# Patient Record
Sex: MALE | Race: BLACK OR AFRICAN AMERICAN | NOT HISPANIC OR LATINO | URBAN - METROPOLITAN AREA
[De-identification: names, ages, dates, MRNs, and addresses within clinical notes are randomized per-mention and may not be internally consistent; named-entity substitution may affect disease eponyms.]

---

## 2017-10-26 ENCOUNTER — OUTPATIENT (OUTPATIENT)
Dept: OUTPATIENT SERVICES | Facility: HOSPITAL | Age: 72
LOS: 1 days | Discharge: HOME | End: 2017-10-26

## 2017-10-26 ENCOUNTER — APPOINTMENT (OUTPATIENT)
Dept: GERIATRICS | Facility: CLINIC | Age: 72
End: 2017-10-26

## 2017-10-26 VITALS
HEART RATE: 76 BPM | HEIGHT: 68 IN | SYSTOLIC BLOOD PRESSURE: 183 MMHG | DIASTOLIC BLOOD PRESSURE: 90 MMHG | BODY MASS INDEX: 32.51 KG/M2 | TEMPERATURE: 97.1 F | WEIGHT: 214.5 LBS

## 2017-10-26 VITALS — DIASTOLIC BLOOD PRESSURE: 83 MMHG | SYSTOLIC BLOOD PRESSURE: 179 MMHG | HEART RATE: 74 BPM

## 2017-10-26 DIAGNOSIS — Z78.9 OTHER SPECIFIED HEALTH STATUS: ICD-10-CM

## 2017-10-30 ENCOUNTER — RESULT REVIEW (OUTPATIENT)
Age: 72
End: 2017-10-30

## 2017-11-08 ENCOUNTER — RESULT CHARGE (OUTPATIENT)
Age: 72
End: 2017-11-08

## 2017-11-09 ENCOUNTER — APPOINTMENT (OUTPATIENT)
Dept: GERIATRICS | Facility: CLINIC | Age: 72
End: 2017-11-09

## 2017-11-09 ENCOUNTER — OUTPATIENT (OUTPATIENT)
Dept: OUTPATIENT SERVICES | Facility: HOSPITAL | Age: 72
LOS: 1 days | Discharge: HOME | End: 2017-11-09

## 2017-11-09 VITALS
WEIGHT: 206 LBS | DIASTOLIC BLOOD PRESSURE: 74 MMHG | HEIGHT: 68 IN | SYSTOLIC BLOOD PRESSURE: 148 MMHG | BODY MASS INDEX: 31.22 KG/M2 | HEART RATE: 84 BPM

## 2017-11-09 DIAGNOSIS — I10 ESSENTIAL (PRIMARY) HYPERTENSION: ICD-10-CM

## 2017-11-09 DIAGNOSIS — I51.7 CARDIOMEGALY: ICD-10-CM

## 2017-11-16 DIAGNOSIS — R73.03 PREDIABETES: ICD-10-CM

## 2017-11-16 DIAGNOSIS — R06.00 DYSPNEA, UNSPECIFIED: ICD-10-CM

## 2017-11-16 DIAGNOSIS — Z23 ENCOUNTER FOR IMMUNIZATION: ICD-10-CM

## 2017-11-16 DIAGNOSIS — I51.7 CARDIOMEGALY: ICD-10-CM

## 2017-11-16 DIAGNOSIS — I10 ESSENTIAL (PRIMARY) HYPERTENSION: ICD-10-CM

## 2017-11-27 ENCOUNTER — APPOINTMENT (OUTPATIENT)
Dept: CARDIOLOGY | Facility: CLINIC | Age: 72
End: 2017-11-27

## 2017-11-27 ENCOUNTER — OUTPATIENT (OUTPATIENT)
Dept: OUTPATIENT SERVICES | Facility: HOSPITAL | Age: 72
LOS: 1 days | Discharge: HOME | End: 2017-11-27

## 2017-11-27 VITALS
BODY MASS INDEX: 30.92 KG/M2 | DIASTOLIC BLOOD PRESSURE: 76 MMHG | HEART RATE: 73 BPM | SYSTOLIC BLOOD PRESSURE: 145 MMHG | WEIGHT: 204 LBS | HEIGHT: 68 IN

## 2017-12-13 LAB
25(OH)D3 SERPL-MCNC: 37 NG/ML
ALBUMIN SERPL-MCNC: 4.1 G/DL
ALBUMIN/GLOB SERPL: 1.58
ALP SERPL-CCNC: 86 IU/L
ALT SERPL-CCNC: 28 IU/L
ANION GAP SERPL CALC-SCNC: 10 MMOL/L
AST SERPL-CCNC: 25 IU/L
BASOPHILS # BLD: 0.02 TH/MM3
BASOPHILS NFR BLD: 0.3 %
BILIRUB SERPL-MCNC: 1 MG/DL
BUN SERPL-MCNC: 16 MG/DL
BUN/CREAT SERPL: 17.6 %
CALCIUM SERPL-MCNC: 9.5 MG/DL
CHLORIDE SERPL-SCNC: 100 MMOL/L
CO2 SERPL-SCNC: 28 MMOL/L
CREAT SERPL-MCNC: 0.91 MG/DL
EOSINOPHIL # BLD: 0.16 TH/MM3
EOSINOPHIL NFR BLD: 2.5 %
ERYTHROCYTE [DISTWIDTH] IN BLOOD BY AUTOMATED COUNT: 15.4 %
ESTIMATED AVERGAGE GLUCOSE (NORTH): 137 MG/DL
GFR SERPL CREATININE-BSD FRML MDRD: 100
GLUCOSE SERPL-MCNC: 83 MG/DL
GRANULOCYTES # BLD: 3.27 TH/MM3
GRANULOCYTES NFR BLD: 52.1 %
HBA1C MFR BLD: 6.4 %
HCT VFR BLD AUTO: 42.4 %
HGB BLD-MCNC: 14.1 G/DL
IMM GRANULOCYTES # BLD: 0.01 TH/MM3
IMM GRANULOCYTES NFR BLD: 0.2 %
LYMPHOCYTES # BLD: 2.21 TH/MM3
LYMPHOCYTES NFR BLD: 35.2 %
MCH RBC QN AUTO: 25.5 PG
MCHC RBC AUTO-ENTMCNC: 33.3 G/DL
MCV RBC AUTO: 76.8 FL
MONOCYTES # BLD: 0.61 TH/MM3
MONOCYTES NFR BLD: 9.7 %
PLATELET # BLD: 224 TH/MM3
PMV BLD AUTO: 10.1 FL
POTASSIUM SERPL-SCNC: 4.1 MMOL/L
PROT SERPL-MCNC: 6.7 G/DL
RBC # BLD AUTO: 5.52 ML/MM3
SODIUM SERPL-SCNC: 138 MMOL/L
TSH SERPL DL<=0.005 MIU/L-ACNC: 2.3 UIU/ML
VITAMIN D2 SERPL-MCNC: <4 NG/ML
VITAMIN D3 SERPL-MCNC: 37 NG/ML
WBC # BLD: 6.28 TH/MM3

## 2018-01-05 ENCOUNTER — APPOINTMENT (OUTPATIENT)
Dept: GASTROENTEROLOGY | Facility: CLINIC | Age: 73
End: 2018-01-05

## 2018-02-02 ENCOUNTER — APPOINTMENT (OUTPATIENT)
Dept: PULMONOLOGY | Facility: CLINIC | Age: 73
End: 2018-02-02

## 2018-02-22 ENCOUNTER — APPOINTMENT (OUTPATIENT)
Dept: GERIATRICS | Facility: CLINIC | Age: 73
End: 2018-02-22

## 2018-03-26 ENCOUNTER — OUTPATIENT (OUTPATIENT)
Dept: OUTPATIENT SERVICES | Facility: HOSPITAL | Age: 73
LOS: 1 days | Discharge: HOME | End: 2018-03-26

## 2018-03-26 ENCOUNTER — APPOINTMENT (OUTPATIENT)
Dept: GERIATRICS | Facility: HOSPITAL | Age: 73
End: 2018-03-26

## 2018-03-26 VITALS
DIASTOLIC BLOOD PRESSURE: 90 MMHG | SYSTOLIC BLOOD PRESSURE: 140 MMHG | HEIGHT: 68.5 IN | HEART RATE: 80 BPM | BODY MASS INDEX: 32.06 KG/M2 | WEIGHT: 214 LBS | TEMPERATURE: 98.4 F

## 2018-03-27 DIAGNOSIS — N40.1 BENIGN PROSTATIC HYPERPLASIA WITH LOWER URINARY TRACT SYMPTOMS: ICD-10-CM

## 2018-03-27 DIAGNOSIS — G47.9 SLEEP DISORDER, UNSPECIFIED: ICD-10-CM

## 2018-03-27 DIAGNOSIS — R73.03 PREDIABETES: ICD-10-CM

## 2018-03-27 DIAGNOSIS — I10 ESSENTIAL (PRIMARY) HYPERTENSION: ICD-10-CM

## 2018-04-05 ENCOUNTER — APPOINTMENT (OUTPATIENT)
Dept: GERIATRICS | Facility: CLINIC | Age: 73
End: 2018-04-05

## 2018-05-04 ENCOUNTER — APPOINTMENT (OUTPATIENT)
Dept: GASTROENTEROLOGY | Facility: CLINIC | Age: 73
End: 2018-05-04

## 2018-05-04 ENCOUNTER — OUTPATIENT (OUTPATIENT)
Dept: OUTPATIENT SERVICES | Facility: HOSPITAL | Age: 73
LOS: 1 days | Discharge: HOME | End: 2018-05-04

## 2018-05-04 VITALS
HEART RATE: 83 BPM | WEIGHT: 215 LBS | HEIGHT: 68.5 IN | SYSTOLIC BLOOD PRESSURE: 131 MMHG | DIASTOLIC BLOOD PRESSURE: 71 MMHG | BODY MASS INDEX: 32.21 KG/M2

## 2018-05-25 ENCOUNTER — OUTPATIENT (OUTPATIENT)
Dept: OUTPATIENT SERVICES | Facility: HOSPITAL | Age: 73
LOS: 1 days | Discharge: HOME | End: 2018-05-25

## 2018-05-25 ENCOUNTER — APPOINTMENT (OUTPATIENT)
Dept: PULMONOLOGY | Facility: CLINIC | Age: 73
End: 2018-05-25

## 2018-05-25 VITALS
SYSTOLIC BLOOD PRESSURE: 153 MMHG | HEIGHT: 68.5 IN | WEIGHT: 214 LBS | DIASTOLIC BLOOD PRESSURE: 80 MMHG | OXYGEN SATURATION: 97 % | HEART RATE: 74 BPM | BODY MASS INDEX: 32.06 KG/M2

## 2018-06-12 ENCOUNTER — OUTPATIENT (OUTPATIENT)
Dept: OUTPATIENT SERVICES | Facility: HOSPITAL | Age: 73
LOS: 1 days | Discharge: HOME | End: 2018-06-12

## 2018-06-13 DIAGNOSIS — G47.33 OBSTRUCTIVE SLEEP APNEA (ADULT) (PEDIATRIC): ICD-10-CM

## 2018-07-27 PROBLEM — Z78.9 ALCOHOL USE: Status: INACTIVE | Noted: 2017-10-26

## 2018-09-19 ENCOUNTER — MESSAGE (OUTPATIENT)
Age: 73
End: 2018-09-19

## 2018-09-21 ENCOUNTER — RESULT REVIEW (OUTPATIENT)
Age: 73
End: 2018-09-21

## 2018-09-21 ENCOUNTER — OUTPATIENT (OUTPATIENT)
Dept: OUTPATIENT SERVICES | Facility: HOSPITAL | Age: 73
LOS: 1 days | Discharge: HOME | End: 2018-09-21

## 2018-09-21 VITALS
SYSTOLIC BLOOD PRESSURE: 180 MMHG | TEMPERATURE: 99 F | HEIGHT: 71 IN | RESPIRATION RATE: 18 BRPM | DIASTOLIC BLOOD PRESSURE: 87 MMHG | HEART RATE: 67 BPM | WEIGHT: 214.95 LBS

## 2018-09-21 VITALS — SYSTOLIC BLOOD PRESSURE: 162 MMHG | DIASTOLIC BLOOD PRESSURE: 92 MMHG | RESPIRATION RATE: 16 BRPM | HEART RATE: 61 BPM

## 2018-09-21 NOTE — ASU DISCHARGE PLAN (ADULT/PEDIATRIC). - NOTIFY
Bleeding that does not stop/Pain not relieved by Medications/Fever greater than 101/Persistent Nausea and Vomiting/Excessive Diarrhea/Inability to Tolerate Liquids or Foods

## 2018-09-21 NOTE — CHART NOTE - NSCHARTNOTEFT_GEN_A_CORE
PACU ANESTHESIA ADMISSION NOTE      Procedure:   Post op diagnosis:      ____  Intubated  TV:______       Rate: ______      FiO2: ______    _x___  Patent Airway    _x___  Full return of protective reflexes    _x___  Full recovery from anesthesia / back to baseline status    Vitals:            T:                BP :    124/69            R:  [id number]          Sat:               P:      Mental Status:  _x___ Awake   _____ Alert   _____ Drowsy   _____ Sedated    Nausea/Vomiting:  _x___  NO       ______Yes,   See Post - Op Orders         Pain Scale (0-10):  __0___    Treatment: _x___ None    ____ See Post - Op/PCA Orders    Post - Operative Fluids:   __x__ Oral   ____ See Post - Op Orders    Plan: Discharge:   _x___Home       _____Floor     _____Critical Care    _____  Other:_________________    Comments:  No anesthesia issues or complications noted.  Discharge when criteria met.

## 2018-09-25 LAB — SURGICAL PATHOLOGY STUDY: SIGNIFICANT CHANGE UP

## 2018-09-26 DIAGNOSIS — K52.9 NONINFECTIVE GASTROENTERITIS AND COLITIS, UNSPECIFIED: ICD-10-CM

## 2018-09-26 DIAGNOSIS — D12.3 BENIGN NEOPLASM OF TRANSVERSE COLON: ICD-10-CM

## 2018-09-26 DIAGNOSIS — G47.9 SLEEP DISORDER, UNSPECIFIED: ICD-10-CM

## 2018-09-26 DIAGNOSIS — H91.90 UNSPECIFIED HEARING LOSS, UNSPECIFIED EAR: ICD-10-CM

## 2018-09-26 DIAGNOSIS — K63.5 POLYP OF COLON: ICD-10-CM

## 2018-09-26 DIAGNOSIS — Z12.11 ENCOUNTER FOR SCREENING FOR MALIGNANT NEOPLASM OF COLON: ICD-10-CM

## 2018-09-26 DIAGNOSIS — K64.4 RESIDUAL HEMORRHOIDAL SKIN TAGS: ICD-10-CM

## 2018-09-26 DIAGNOSIS — K64.8 OTHER HEMORRHOIDS: ICD-10-CM

## 2018-09-26 DIAGNOSIS — Z79.82 LONG TERM (CURRENT) USE OF ASPIRIN: ICD-10-CM

## 2018-09-26 DIAGNOSIS — R73.03 PREDIABETES: ICD-10-CM

## 2018-09-26 DIAGNOSIS — I10 ESSENTIAL (PRIMARY) HYPERTENSION: ICD-10-CM

## 2018-09-26 DIAGNOSIS — N40.0 BENIGN PROSTATIC HYPERPLASIA WITHOUT LOWER URINARY TRACT SYMPTOMS: ICD-10-CM

## 2018-09-26 DIAGNOSIS — K57.30 DIVERTICULOSIS OF LARGE INTESTINE WITHOUT PERFORATION OR ABSCESS WITHOUT BLEEDING: ICD-10-CM

## 2018-09-26 DIAGNOSIS — E55.9 VITAMIN D DEFICIENCY, UNSPECIFIED: ICD-10-CM

## 2018-10-03 ENCOUNTER — APPOINTMENT (OUTPATIENT)
Dept: INTERNAL MEDICINE | Facility: CLINIC | Age: 73
End: 2018-10-03

## 2018-10-03 ENCOUNTER — OUTPATIENT (OUTPATIENT)
Dept: OUTPATIENT SERVICES | Facility: HOSPITAL | Age: 73
LOS: 1 days | Discharge: HOME | End: 2018-10-03

## 2018-10-03 ENCOUNTER — MED ADMIN CHARGE (OUTPATIENT)
Age: 73
End: 2018-10-03

## 2018-10-03 PROBLEM — I10 ESSENTIAL (PRIMARY) HYPERTENSION: Chronic | Status: ACTIVE | Noted: 2018-09-21

## 2018-10-06 DIAGNOSIS — Z23 ENCOUNTER FOR IMMUNIZATION: ICD-10-CM

## 2018-12-12 ENCOUNTER — APPOINTMENT (OUTPATIENT)
Dept: GERIATRICS | Facility: CLINIC | Age: 73
End: 2018-12-12

## 2018-12-12 ENCOUNTER — OUTPATIENT (OUTPATIENT)
Dept: OUTPATIENT SERVICES | Facility: HOSPITAL | Age: 73
LOS: 1 days | Discharge: HOME | End: 2018-12-12

## 2018-12-12 VITALS
WEIGHT: 208 LBS | TEMPERATURE: 97.2 F | DIASTOLIC BLOOD PRESSURE: 71 MMHG | SYSTOLIC BLOOD PRESSURE: 131 MMHG | HEART RATE: 79 BPM | HEIGHT: 68.5 IN | BODY MASS INDEX: 31.16 KG/M2

## 2018-12-12 RX ORDER — POLYETHYLENE GLYCOL 3350, SODIUM SULFATE ANHYDROUS, SODIUM BICARBONATE, SODIUM CHLORIDE, POTASSIUM CHLORIDE 227.1; 21.5; 6.36; 5.53; .754 G/L; G/L; G/L; G/L; G/L
227.1 POWDER, FOR SOLUTION ORAL
Qty: 1 | Refills: 0 | Status: COMPLETED | COMMUNITY
Start: 2018-05-04 | End: 2018-12-12

## 2018-12-12 RX ORDER — TAMSULOSIN HYDROCHLORIDE 0.4 MG/1
0.4 CAPSULE ORAL
Qty: 30 | Refills: 3 | Status: DISCONTINUED | COMMUNITY
Start: 2018-03-26 | End: 2018-03-26

## 2018-12-14 DIAGNOSIS — I10 ESSENTIAL (PRIMARY) HYPERTENSION: ICD-10-CM

## 2018-12-14 DIAGNOSIS — G47.9 SLEEP DISORDER, UNSPECIFIED: ICD-10-CM

## 2018-12-14 DIAGNOSIS — N40.0 BENIGN PROSTATIC HYPERPLASIA WITHOUT LOWER URINARY TRACT SYMPTOMS: ICD-10-CM

## 2019-01-16 ENCOUNTER — LABORATORY RESULT (OUTPATIENT)
Age: 74
End: 2019-01-16

## 2019-01-16 ENCOUNTER — OUTPATIENT (OUTPATIENT)
Dept: OUTPATIENT SERVICES | Facility: HOSPITAL | Age: 74
LOS: 1 days | Discharge: HOME | End: 2019-01-16

## 2019-01-16 DIAGNOSIS — Z00.00 ENCOUNTER FOR GENERAL ADULT MEDICAL EXAMINATION WITHOUT ABNORMAL FINDINGS: ICD-10-CM

## 2019-01-24 ENCOUNTER — OUTPATIENT (OUTPATIENT)
Dept: OUTPATIENT SERVICES | Facility: HOSPITAL | Age: 74
LOS: 1 days | Discharge: HOME | End: 2019-01-24

## 2019-01-24 ENCOUNTER — APPOINTMENT (OUTPATIENT)
Dept: GERIATRICS | Facility: CLINIC | Age: 74
End: 2019-01-24

## 2019-01-24 DIAGNOSIS — M25.562 PAIN IN LEFT KNEE: ICD-10-CM

## 2019-01-24 DIAGNOSIS — M54.5 LOW BACK PAIN: ICD-10-CM

## 2019-02-07 ENCOUNTER — APPOINTMENT (OUTPATIENT)
Dept: GERIATRICS | Facility: CLINIC | Age: 74
End: 2019-02-07

## 2019-02-07 ENCOUNTER — OUTPATIENT (OUTPATIENT)
Dept: OUTPATIENT SERVICES | Facility: HOSPITAL | Age: 74
LOS: 1 days | Discharge: HOME | End: 2019-02-07

## 2019-02-07 VITALS
SYSTOLIC BLOOD PRESSURE: 175 MMHG | HEART RATE: 74 BPM | WEIGHT: 206 LBS | DIASTOLIC BLOOD PRESSURE: 80 MMHG | TEMPERATURE: 96.8 F | BODY MASS INDEX: 30.86 KG/M2 | HEIGHT: 68.5 IN

## 2019-02-07 DIAGNOSIS — R06.09 OTHER FORMS OF DYSPNEA: ICD-10-CM

## 2019-02-07 NOTE — SOCIAL HISTORY
[No falls in past year] : Patient reported no falls in the past year [Fully functional (bathing, dressing, toileting, transferring, walking, feeding)] : Fully functional (bathing, dressing, toileting, transferring, walking, feeding)

## 2019-02-07 NOTE — PHYSICAL EXAM
[Total Score ___ / 30] : the patient achieved a score of [unfilled] /30 [Date / Time ___ / 5] : date / time [unfilled] / 5 [Place ___ / 5] : place [unfilled] / 5 [Registration ___ / 3] : registration [unfilled] / 3 [Serial Sevens ___/5] : serial sevens [unfilled] / 5 [Naming 2 Objects ___ / 2] : naming two objects [unfilled] / 2 [Repeating a Sentence ___ / 1] : repeating a sentence [unfilled] / 1 [Writing a Sentence ___ / 1] : write sentence [unfilled] / 1 [3-stage Verbal Command ___ / 3] : three-stage verbal command [unfilled] / 3 [Written Command ___ / 1] : written command [unfilled] / 1 [Copy a Design ___ / 1] : copy a design [unfilled] / 1 [Recall ___ / 3] : recall [unfilled] / 3 [Oriented To Time, Place, And Person] : oriented to person, place, and time [General Appearance - Alert] : alert [General Appearance - In No Acute Distress] : in no acute distress [PERRL With Normal Accommodation] : pupils were equal in size, round, and reactive to light [Neck Appearance] : the appearance of the neck was normal [Heart Sounds] : normal S1 and S2 [Edema] : there was no peripheral edema [Bowel Sounds] : normal bowel sounds [Abdomen Soft] : soft [Abdomen Tenderness] : non-tender [Abnormal Walk] : normal gait [Skin Turgor] : normal skin turgor [No Focal Deficits] : no focal deficits

## 2019-02-07 NOTE — ASSESSMENT
[FreeTextEntry1] : *HTN\par -uncontrolled he ran out of the medications\par -medications renewed\par \par *blurry vision\par -ophtalmo r/o cataract\par \par *left hearing loss\par -ENT referral\par \par **memory impairment\par -neuropsych evaluation\par -b12, homocysteine, rpr\par \par *left knee pain\par -xray degenerative changes\par -tylenol prn\par \par *HCM\par -flu vaccine given\par -colonoscopy done this year, follow up with GI for results\par -RTC in 4 months and PRN [Daily physical exercise as tolerated] : Daily physical exercise as tolerated

## 2019-02-07 NOTE — REVIEW OF SYSTEMS
[Eyesight Problems] : eyesight problems [Loss Of Hearing] : hearing loss [Fever] : no fever [Chills] : no chills [Nosebleeds] : no nosebleeds [Chest Pain] : no chest pain [Palpitations] : no palpitations [Cough] : no cough [SOB on Exertion] : no shortness of breath during exertion [Abdominal Pain] : no abdominal pain [Constipation] : no constipation

## 2019-02-07 NOTE — HISTORY OF PRESENT ILLNESS
[Memory Lapses Or Loss] : denies memory impairment [Patient Observed To Be Agitated] : denies agitation [Hostility Toward Caregivers] : denies aggression [Sleep Disturbances] : denies sleep disturbances [FreeTextEntry1] : 74 yo AA male with HTN and hx of cardiomegaly reports for follow up visit\par \par he reports left knee pain for 5 years now, mild pain, he can still walk, did not try any painkillers.\par left ear hearing loss, no pain, no tinnitus.\par blurry vision never evaluated\par \par he ran out of his medications, /80 denies chest pain, H/A\par \par no dyspnea on exertion, no urinary incontinence, independent in activity of daily living, no fall

## 2019-03-08 ENCOUNTER — OUTPATIENT (OUTPATIENT)
Dept: OUTPATIENT SERVICES | Facility: HOSPITAL | Age: 74
LOS: 1 days | Discharge: HOME | End: 2019-03-08

## 2019-04-17 ENCOUNTER — OUTPATIENT (OUTPATIENT)
Dept: OUTPATIENT SERVICES | Facility: HOSPITAL | Age: 74
LOS: 1 days | Discharge: HOME | End: 2019-04-17

## 2019-04-29 DIAGNOSIS — H35.033 HYPERTENSIVE RETINOPATHY, BILATERAL: ICD-10-CM

## 2019-04-29 DIAGNOSIS — H25.13 AGE-RELATED NUCLEAR CATARACT, BILATERAL: ICD-10-CM

## 2019-04-29 DIAGNOSIS — H40.023 OPEN ANGLE WITH BORDERLINE FINDINGS, HIGH RISK, BILATERAL: ICD-10-CM

## 2019-06-10 ENCOUNTER — OUTPATIENT (OUTPATIENT)
Dept: OUTPATIENT SERVICES | Facility: HOSPITAL | Age: 74
LOS: 1 days | Discharge: HOME | End: 2019-06-10
Payer: SUBSIDIZED

## 2019-06-10 PROCEDURE — 92083 EXTENDED VISUAL FIELD XM: CPT | Mod: 26

## 2019-06-10 PROCEDURE — 99213 OFFICE O/P EST LOW 20 MIN: CPT

## 2019-07-15 ENCOUNTER — OUTPATIENT (OUTPATIENT)
Dept: OUTPATIENT SERVICES | Facility: HOSPITAL | Age: 74
LOS: 1 days | Discharge: HOME | End: 2019-07-15
Payer: SUBSIDIZED

## 2019-07-15 DIAGNOSIS — H25.13 AGE-RELATED NUCLEAR CATARACT, BILATERAL: ICD-10-CM

## 2019-07-15 DIAGNOSIS — H40.1122 PRIMARY OPEN-ANGLE GLAUCOMA, LEFT EYE, MODERATE STAGE: ICD-10-CM

## 2019-07-15 DIAGNOSIS — H40.1123 PRIMARY OPEN-ANGLE GLAUCOMA, LEFT EYE, SEVERE STAGE: ICD-10-CM

## 2019-07-15 PROCEDURE — 99213 OFFICE O/P EST LOW 20 MIN: CPT

## 2019-08-04 ENCOUNTER — EMERGENCY (EMERGENCY)
Facility: HOSPITAL | Age: 74
LOS: 0 days | Discharge: HOME | End: 2019-08-05
Attending: EMERGENCY MEDICINE | Admitting: EMERGENCY MEDICINE
Payer: MEDICAID

## 2019-08-04 VITALS
SYSTOLIC BLOOD PRESSURE: 159 MMHG | HEART RATE: 75 BPM | DIASTOLIC BLOOD PRESSURE: 78 MMHG | TEMPERATURE: 98 F | OXYGEN SATURATION: 100 % | RESPIRATION RATE: 18 BRPM

## 2019-08-04 DIAGNOSIS — R51 HEADACHE: ICD-10-CM

## 2019-08-04 DIAGNOSIS — M79.10 MYALGIA, UNSPECIFIED SITE: ICD-10-CM

## 2019-08-04 DIAGNOSIS — I10 ESSENTIAL (PRIMARY) HYPERTENSION: ICD-10-CM

## 2019-08-04 DIAGNOSIS — R07.89 OTHER CHEST PAIN: ICD-10-CM

## 2019-08-04 DIAGNOSIS — R60.0 LOCALIZED EDEMA: ICD-10-CM

## 2019-08-04 DIAGNOSIS — R07.9 CHEST PAIN, UNSPECIFIED: ICD-10-CM

## 2019-08-04 LAB
ALBUMIN SERPL ELPH-MCNC: 4.4 G/DL — SIGNIFICANT CHANGE UP (ref 3.5–5.2)
ALP SERPL-CCNC: 98 U/L — SIGNIFICANT CHANGE UP (ref 30–115)
ALT FLD-CCNC: 23 U/L — SIGNIFICANT CHANGE UP (ref 0–41)
ANION GAP SERPL CALC-SCNC: 14 MMOL/L — SIGNIFICANT CHANGE UP (ref 7–14)
AST SERPL-CCNC: 23 U/L — SIGNIFICANT CHANGE UP (ref 0–41)
BILIRUB SERPL-MCNC: 0.3 MG/DL — SIGNIFICANT CHANGE UP (ref 0.2–1.2)
BUN SERPL-MCNC: 13 MG/DL — SIGNIFICANT CHANGE UP (ref 10–20)
CALCIUM SERPL-MCNC: 9.7 MG/DL — SIGNIFICANT CHANGE UP (ref 8.5–10.1)
CHLORIDE SERPL-SCNC: 101 MMOL/L — SIGNIFICANT CHANGE UP (ref 98–110)
CO2 SERPL-SCNC: 26 MMOL/L — SIGNIFICANT CHANGE UP (ref 17–32)
CREAT SERPL-MCNC: 1 MG/DL — SIGNIFICANT CHANGE UP (ref 0.7–1.5)
GLUCOSE SERPL-MCNC: 100 MG/DL — HIGH (ref 70–99)
HCT VFR BLD CALC: 43.8 % — SIGNIFICANT CHANGE UP (ref 42–52)
HGB BLD-MCNC: 13.8 G/DL — LOW (ref 14–18)
MCHC RBC-ENTMCNC: 25.3 PG — LOW (ref 27–31)
MCHC RBC-ENTMCNC: 31.5 G/DL — LOW (ref 32–37)
MCV RBC AUTO: 80.2 FL — SIGNIFICANT CHANGE UP (ref 80–94)
NRBC # BLD: 0 /100 WBCS — SIGNIFICANT CHANGE UP (ref 0–0)
NT-PROBNP SERPL-SCNC: <5 PG/ML — SIGNIFICANT CHANGE UP (ref 0–300)
PLATELET # BLD AUTO: 222 K/UL — SIGNIFICANT CHANGE UP (ref 130–400)
POTASSIUM SERPL-MCNC: 4.1 MMOL/L — SIGNIFICANT CHANGE UP (ref 3.5–5)
POTASSIUM SERPL-SCNC: 4.1 MMOL/L — SIGNIFICANT CHANGE UP (ref 3.5–5)
PROT SERPL-MCNC: 7.8 G/DL — SIGNIFICANT CHANGE UP (ref 6–8)
RBC # BLD: 5.46 M/UL — SIGNIFICANT CHANGE UP (ref 4.7–6.1)
RBC # FLD: 13.2 % — SIGNIFICANT CHANGE UP (ref 11.5–14.5)
SODIUM SERPL-SCNC: 141 MMOL/L — SIGNIFICANT CHANGE UP (ref 135–146)
TROPONIN T SERPL-MCNC: <0.01 NG/ML — SIGNIFICANT CHANGE UP
WBC # BLD: 5.79 K/UL — SIGNIFICANT CHANGE UP (ref 4.8–10.8)
WBC # FLD AUTO: 5.79 K/UL — SIGNIFICANT CHANGE UP (ref 4.8–10.8)

## 2019-08-04 PROCEDURE — 71046 X-RAY EXAM CHEST 2 VIEWS: CPT | Mod: 26

## 2019-08-04 PROCEDURE — 99285 EMERGENCY DEPT VISIT HI MDM: CPT

## 2019-08-04 RX ORDER — METOCLOPRAMIDE HCL 10 MG
10 TABLET ORAL ONCE
Refills: 0 | Status: COMPLETED | OUTPATIENT
Start: 2019-08-04 | End: 2019-08-04

## 2019-08-04 RX ORDER — ACETAMINOPHEN 500 MG
650 TABLET ORAL ONCE
Refills: 0 | Status: COMPLETED | OUTPATIENT
Start: 2019-08-04 | End: 2019-08-04

## 2019-08-04 RX ADMIN — Medication 650 MILLIGRAM(S): at 21:05

## 2019-08-04 RX ADMIN — Medication 10 MILLIGRAM(S): at 21:05

## 2019-08-04 NOTE — ED PROVIDER NOTE - CARE PLAN
Assessment and plan of treatment:	Plan: EKG, CXR, labs, reglan, tylneol, ct head, reassess. Principal Discharge DX:	Chest pain  Assessment and plan of treatment:	Plan: EKG, CXR, labs, reglan, tylneol, ct head, reassess.

## 2019-08-04 NOTE — ED ADULT NURSE NOTE - NSIMPLEMENTINTERV_GEN_ALL_ED
Implemented All Universal Safety Interventions:  [place] to call system. Call bell, personal items and telephone within reach. Instruct patient to call for assistance. Room bathroom lighting operational. Non-slip footwear when patient is off stretcher. Physically safe environment: no spills, clutter or unnecessary equipment. Stretcher in lowest position, wheels locked, appropriate side rails in place.

## 2019-08-04 NOTE — ED ADULT NURSE NOTE - CHPI ED NUR SYMPTOMS NEG
no nausea/no chills/no fever/no shortness of breath/no vomiting/no syncope/no congestion/no diaphoresis/no dizziness/no back pain

## 2019-08-04 NOTE — ED PROVIDER NOTE - OBJECTIVE STATEMENT
74y M HTN presenting for chest pain x3-4 days. Substernal, feels like pressure, comes and goes, last a few seconds. No aggravating, relieving, or inciting factors. No f/c/n/v. Bilateral L E swelling. Pt uses two pillows at night when he sleeps. No SOB, abdominal pain, or cough. +myalgia +frontal headache x2 weeks. Band like distribution, pressure sensation. No neurological deficits. No neck stiffness.

## 2019-08-04 NOTE — ED PROVIDER NOTE - CLINICAL SUMMARY MEDICAL DECISION MAKING FREE TEXT BOX
pt aware of all labs and imaging, reports feeling better, nih 0, no focal deficits, no chest pain at this time, aware of plan of obs and agrees, obs team aware of pt.

## 2019-08-04 NOTE — ED PROVIDER NOTE - ATTENDING CONTRIBUTION TO CARE
73 y/o m w/ pmhx of htn, constipation (last bm this morning) presents for mid-sternal cp x ~ 4 days, constant, dull in nature, non-radiating, associated with frontal HA, not the worst headache, non-radiating, improved with Tylenol, gradual in onset, sob on exertion, and orthopnea. denies fever, chills, n/v, palpitations, diaphoresis, cough, tinnitus, ear pain, hearing loss, neck pain/stiffness, back pain, photophobia/phonophobia, blurry vision/visual changes, abd pain, diarrhea,  melena/brbpr, urinary symptoms, weakness, numbness/tingling,  syncope, sick contacts, recent travel or rash. He thinks cardiologist is Dr. Brito. Not a smoker.   on exam:   Constitutional: wdwn male sitting on stretcher in nad.  Skin: no rash, no signs of trauma:  HEENT: PERRL, EOM intact no nystagmus that easily fatigues, mmm.  NECK and BACK: neck supple, no spinous ttp to neck or back, FROM, no palpable shelves or step offs, no meningeal signs.  CARDIO: regular rate, radial pulses 2/4 b/l, no carotid bruit.  Lungs: Ctabl w/ breath sounds present b/l, poor air exchange, poor respiratory effort, no accessory muscle use, no tachypnea, no stridor  ABD: BS present throughout all 4 quadrants, abd soft, nd, nt, no rebound tenderness or guarding, no cvat,;  EXT: FROM of upper and lower ext, no drift, no calf pain/swelling/erythema.  NEURO: AAOx3. Motor 5/5 and sensation intact throughout upper and lower ext. CN II-XII intact. No facial droop or slurring of speech. (-) Pronator. no dysmetria w/ ftn or rapid alternating fine movements,  ambulating with no ataxia or difficulty, NIH O.

## 2019-08-04 NOTE — ED PROVIDER NOTE - PROGRESS NOTE DETAILS
pt reassessed after tylenol/reglan. Says his headache has improved and he feels better. pt feeling much better, labs reviewed, pend ct. pt feeling much better, labs reviewed, pending ct. pt reports feeling much better . no headache, ambulatory in ed, intermittent chest pain in ed, aware of all labs and imaging, agrees with plan for obs, obs CECI Bird aware of pt.

## 2019-08-05 VITALS
OXYGEN SATURATION: 97 % | DIASTOLIC BLOOD PRESSURE: 79 MMHG | SYSTOLIC BLOOD PRESSURE: 146 MMHG | TEMPERATURE: 97 F | HEART RATE: 74 BPM | RESPIRATION RATE: 20 BRPM

## 2019-08-05 LAB
CHOLEST SERPL-MCNC: 238 MG/DL — HIGH (ref 100–200)
HDLC SERPL-MCNC: 59 MG/DL — SIGNIFICANT CHANGE UP
LIPID PNL WITH DIRECT LDL SERPL: 167 MG/DL — HIGH (ref 4–129)
TOTAL CHOLESTEROL/HDL RATIO MEASUREMENT: 4 RATIO — SIGNIFICANT CHANGE UP (ref 4–5.5)
TRIGL SERPL-MCNC: 105 MG/DL — SIGNIFICANT CHANGE UP (ref 10–149)
TROPONIN T SERPL-MCNC: <0.01 NG/ML — SIGNIFICANT CHANGE UP

## 2019-08-05 PROCEDURE — 93018 CV STRESS TEST I&R ONLY: CPT

## 2019-08-05 PROCEDURE — 78452 HT MUSCLE IMAGE SPECT MULT: CPT | Mod: 26

## 2019-08-05 PROCEDURE — 93010 ELECTROCARDIOGRAM REPORT: CPT

## 2019-08-05 PROCEDURE — 93016 CV STRESS TEST SUPVJ ONLY: CPT

## 2019-08-05 PROCEDURE — 99236 HOSP IP/OBS SAME DATE HI 85: CPT

## 2019-08-05 PROCEDURE — 70450 CT HEAD/BRAIN W/O DYE: CPT | Mod: 26

## 2019-08-05 RX ORDER — LISINOPRIL 2.5 MG/1
10 TABLET ORAL DAILY
Refills: 0 | Status: DISCONTINUED | OUTPATIENT
Start: 2019-08-05 | End: 2019-08-05

## 2019-08-05 RX ADMIN — LISINOPRIL 10 MILLIGRAM(S): 2.5 TABLET ORAL at 05:55

## 2019-08-05 NOTE — ED CDU PROVIDER INITIAL DAY NOTE - MEDICAL DECISION MAKING DETAILS
75 y/o male with HTn presenting to chest pain. ED workup WNL. Currently without sx's. Pending nuclear stress test.

## 2019-08-05 NOTE — ED CDU PROVIDER DISPOSITION NOTE - CARE PROVIDER_API CALL
Nahum Brito (MD)  Cardiovascular Disease; Internal Medicine; Interventional Cardiology  [address] [place] [address]  Phone: [phone]  Fax: [phone]  Follow Up Time:

## 2019-08-05 NOTE — ED CDU PROVIDER DISPOSITION NOTE - CLINICAL COURSE
73 y/o male with hx of HTN presented to ED with intermittent chest pain. ED workup negative. Pt placed in obs for ACS eval. Second trop and EKg WNL. Nuc stress test normal. Will d/c pt. Results reviewed and discussed with pt and printed for patient. Anticipatory guidance given including close outpatient followup. Strict return precautions given. Pt verbalizes understanding of and agrees with plan.

## 2019-08-05 NOTE — ED CDU PROVIDER DISPOSITION NOTE - ATTENDING CONTRIBUTION TO CARE
I personally evaluated the patient. I reviewed the Resident’s or Physician Assistant’s note (as assigned above), and agree with the findings and plan except as documented in my note.    75 y/o male with hx of HTN presented to ED with intermittent chest pain. ED workup negative. Pt placed in obs for ACS eval. Second trop and EKg WNL. Nuc stress test normal. Will d/c pt. Results reviewed and discussed with pt and printed for patient. Anticipatory guidance given including close outpatient followup. Strict return precautions given. Pt verbalizes understanding of and agrees with plan.

## 2019-08-05 NOTE — ED CDU PROVIDER INITIAL DAY NOTE - OBJECTIVE STATEMENT
75 yo M with PMHx of HTN presents to the ED c/o mild mid-sternal non-radiating chest pain x 4 days. Symptoms have been intermittent since onset. He denies aggravating/alleviating factors. Pt denies hx of similar symptoms in the past. Pt has never had cardiac workup. Pt is non-smoker. Pt initially presented with associated headache but it has since resolved. Pt denies fever, chills, nausea, vomiting, abdominal pain, diarrhea, headache, dizziness, weakness, SOB, back pain, LOC, trauma, urinary symptoms, cough, calf pain/swelling, recent travel, recent surgery.

## 2019-08-05 NOTE — ED ADULT NURSE REASSESSMENT NOTE - NS ED NURSE REASSESS COMMENT FT1
patient placed in the observation unit for further observation, ED3 bed 7. Patient scheduled for stress test in the am. Patient remains on cardiac monitor. Patient in stable condition and nad..  Report given to WILLIS Roy.
PT received from previous RN. pt assessed, axxo3, NAD, VSS, denies any symptoms at present, no cp, no SOB, no difficulty breathing. safety and comfort measures given. on cardiac monitor. continue to monitor.

## 2019-08-05 NOTE — ED CDU PROVIDER INITIAL DAY NOTE - PROGRESS NOTE DETAILS
pt seen bedside, NAD, no complaints of chest pain overnight, resting in bed comfortably. No prior cardiac work up. will go for pharm nuc, negative cardiac enzymes x2 and nl ekg

## 2019-08-05 NOTE — ED CDU PROVIDER INITIAL DAY NOTE - PHYSICAL EXAMINATION
VITAL SIGNS: I have reviewed nursing notes and confirm.  CONSTITUTIONAL: Well-developed; well-nourished; in no acute distress.  SKIN: Skin exam is warm and dry, no acute rash.  HEAD: Normocephalic; atraumatic.  EYES: Conjunctiva and sclera clear.  ENT: No nasal discharge; airway clear.   NECK: Supple; non tender.  CARD: S1, S2 normal; no murmurs, gallops, or rubs. Regular rate and rhythm.  RESP: No wheezes, rales or rhonchi. Speaking in full sentences.   ABD: Normal bowel sounds; soft; non-distended; non-tender; No rebound or guarding. No CVA tenderness.  EXT: Normal ROM. No clubbing, cyanosis. (+) mild B/L LE edema. No calf TTP. DP 2+.   NEURO: Alert, oriented. Grossly unremarkable. No focal deficits.

## 2019-08-05 NOTE — ED CDU PROVIDER INITIAL DAY NOTE - ST/T WAVE
[Normal Development] : development [None] : No known medical problems [Normal Growth] : growth [No Skin Concerns] : skin [No Elimination Concerns] : elimination [No Feeding Concerns] : feeding [Normal Sleep Pattern] : sleep [Development and Mental Health] : development and mental health [School] : school [Safety] : safety [Oral Health] : oral health [Nutrition and Physical Activity] : nutrition and physical activity [Patient] : patient [No Medications] : ~He/She~ is not on any medications [FreeTextEntry1] : Continue balanced diet with all food groups. Brush teeth twice a day with toothbrush. Recommend visit to dentist. Help child to maintain consistent daily routines and sleep schedule. School discussed. Ensure home is safe. Teach child about personal safety. Use consistent, positive discipline. Limit screen time to no more than 2 hours per day. Encourage physical activity.\par \par Return 1 year for routine well child check.\par refer to labs No acute ST/T wave changes.

## 2019-08-05 NOTE — ED CDU PROVIDER INITIAL DAY NOTE - NS ED ROS FT
Review of Systems  Constitutional:  No fever, chills.  Eyes:  No visual changes, eye pain, or discharge.  ENMT:  No hearing changes, pain, or discharge. No nasal congestion, discharge, or bleeding. No throat pain, swelling, or difficulty swallowing.  Cardiac:  No palpitations, syncope, or edema. (+) chest pain  Respiratory:  No dyspnea, cough. No hemoptysis.  GI:  No nausea, vomiting, diarrhea, or abdominal pain.   :  No dysuria, hematuria, frequency, or burning.   MS:  No back pain.  Skin:  No skin rash, pruritis, jaundice, or lesions.  Neuro:  No headache, dizziness, loss of sensation, or focal weakness.  No change in mental status.   Endocrine: No history of thyroid disease or diabetes.

## 2019-08-05 NOTE — ED CDU PROVIDER INITIAL DAY NOTE - ATTENDING CONTRIBUTION TO CARE
I personally evaluated the patient. I reviewed the Resident’s or Physician Assistant’s note (as assigned above), and agree with the findings and plan except as documented in my note.    Pt is a 75 yo M with PMHx of HTN presented to the ED c/o mild mid-sternal non-radiating chest pain x 4 days. Currently asymptomatic. Labs and imaging obtained in ED and WNL. Placed in obs for further medical management. Pt pending nuclear stress test.    Constitutional: Well developed, well nourished. NAD.  Head: Normocephalic, atraumatic.  Eyes: PERRL. EOMI.  ENT: No nasal discharge. Mucous membranes moist.  Neck: Supple. Painless ROM.  Cardiovascular: Normal S1, S2. Regular rate and rhythm. No murmurs, rubs, or gallops.  Pulmonary: Normal respiratory rate and effort. Lungs clear to auscultation bilaterally. No wheezing, rales, or rhonchi.  Abdominal: Soft. Nondistended. Nontender. No rebound, guarding, rigidity.  Extremities. Pelvis stable. No lower extremity edema, symmetric calves.  Skin: No rashes, cyanosis.  Neuro: AAOx3. No focal neurological deficits.  Psych: Normal mood. Normal affect.

## 2019-08-08 ENCOUNTER — APPOINTMENT (OUTPATIENT)
Dept: GERIATRICS | Facility: CLINIC | Age: 74
End: 2019-08-08
Payer: COMMERCIAL

## 2019-08-08 ENCOUNTER — OUTPATIENT (OUTPATIENT)
Dept: OUTPATIENT SERVICES | Facility: HOSPITAL | Age: 74
LOS: 1 days | Discharge: HOME | End: 2019-08-08

## 2019-08-08 VITALS
DIASTOLIC BLOOD PRESSURE: 99 MMHG | SYSTOLIC BLOOD PRESSURE: 154 MMHG | TEMPERATURE: 96.5 F | HEIGHT: 68.5 IN | WEIGHT: 209 LBS | HEART RATE: 76 BPM | BODY MASS INDEX: 31.31 KG/M2

## 2019-08-08 DIAGNOSIS — T78.40XA ALLERGY, UNSPECIFIED, INITIAL ENCOUNTER: ICD-10-CM

## 2019-08-08 PROCEDURE — 99213 OFFICE O/P EST LOW 20 MIN: CPT

## 2019-08-16 LAB
ALBUMIN SERPL ELPH-MCNC: 4.4 G/DL
ALP BLD-CCNC: 89 U/L
ALT SERPL-CCNC: 22 U/L
ANION GAP SERPL CALC-SCNC: 12 MMOL/L
AST SERPL-CCNC: 21 U/L
BILIRUB SERPL-MCNC: 0.5 MG/DL
BUN SERPL-MCNC: 10 MG/DL
CALCIUM SERPL-MCNC: 9.2 MG/DL
CHLORIDE SERPL-SCNC: 101 MMOL/L
CHOLEST SERPL-MCNC: 213 MG/DL
CHOLEST/HDLC SERPL: 3.7 RATIO
CO2 SERPL-SCNC: 29 MMOL/L
CREAT SERPL-MCNC: 0.9 MG/DL
GLUCOSE SERPL-MCNC: 104 MG/DL
HDLC SERPL-MCNC: 58 MG/DL
LDLC SERPL CALC-MCNC: 152 MG/DL
POTASSIUM SERPL-SCNC: 3.9 MMOL/L
PROT SERPL-MCNC: 6.9 G/DL
SODIUM SERPL-SCNC: 142 MMOL/L
TRIGL SERPL-MCNC: 98 MG/DL
TSH SERPL-ACNC: 3.07 UIU/ML

## 2019-08-16 NOTE — ASSESSMENT
[Medication Management] : medication management [FreeTextEntry1] : 75 yo AA male with HTN and hx of cardiomegaly reports for follow up visit after ED eval for CP\par \par S/p chest pain, hx of cardiomegaly, but no follow up ECHO completed- will order ECHO again, stress test may need to be repeated, follow up with Dr Horton for further recommendation\par no recurrent CP\par \par HTN c/w current medications, instructed to take meds prior appts\par \par DM type 2\par follow up HgA1c 6.1\par - counselling provided, will monitor, diet modification\par : will start Lipitor 20 mg q hs\par \par Otitis media left: Augmentin for 7 days, and follow up with ENT\par previously referred for hearing loss, did not go\par \par Thyroid\par - Slightly enlarged on palpation: TSH normal\par - U/S of thyroid r/o enlarged thyroid not done\par \par Urinary incontinence denies\par - was previously prescribed Flomax, but does not take it\par - requested Bladder sonogram after 1-2 months of Flomax, but will not re-order it now as patient denies any urinary complaints\par \par Knee pain likely OA\par not debilitating, recommended Tylenol PRN, may benefit from stationary bike exercises but would have cardio follow up first\par \par *HCM\par - Pt received flu vaccine last year\par - Return in 3 months\par \par \par

## 2019-08-16 NOTE — HISTORY OF PRESENT ILLNESS
[0] : 2) Feeling down, depressed, or hopeless: Not at all [FreeTextEntry1] : 75 yo AA male with HTN and Hx of cardiomegaly reports for follow up visit after ED evaluation on 8/4 for chest pain, now with complains of left knee pain, started 5 years ago, mild pain getting progressively worse, he can still walk without problems, did not try any painkillers.\par Patient also reports left ear hearing loss,feels clogged, no pain, no tinnitus.\par and blurry vision which was already evaluated by ophthalmologist\par \par Pt did not take his med today, /99, denies chest pain, H/A, SOB, but does sleep with 2 pillows at night. In ED, stress test was done, but non-diagnostic \par \par no dyspnea on exertion, no urinary incontinence, independent in activity of daily living, no fall

## 2019-08-16 NOTE — PHYSICAL EXAM
[General Appearance - Alert] : alert [General Appearance - In No Acute Distress] : in no acute distress [Normal Oral Mucosa] : normal oral mucosa [Neck Appearance] : the appearance of the neck was normal [Neck Cervical Mass (___cm)] : no neck mass was observed [] : no respiratory distress [Auscultation Breath Sounds / Voice Sounds] : lungs were clear to auscultation bilaterally [Heart Rate And Rhythm] : heart rate was normal and rhythm regular [Heart Sounds] : normal S1 and S2 [Edema] : there was no peripheral edema [Abdomen Soft] : soft [Abdomen Tenderness] : non-tender [Abnormal Walk] : normal gait [No Focal Deficits] : no focal deficits [Musculoskeletal - Swelling] : no joint swelling seen [FreeTextEntry1] : left ear: TM edematous and erythematous, no discharge

## 2019-08-20 DIAGNOSIS — E78.5 HYPERLIPIDEMIA, UNSPECIFIED: ICD-10-CM

## 2019-08-20 DIAGNOSIS — H60.399 OTHER INFECTIVE OTITIS EXTERNA, UNSPECIFIED EAR: ICD-10-CM

## 2019-08-20 DIAGNOSIS — I10 ESSENTIAL (PRIMARY) HYPERTENSION: ICD-10-CM

## 2019-08-20 DIAGNOSIS — H91.90 UNSPECIFIED HEARING LOSS, UNSPECIFIED EAR: ICD-10-CM

## 2019-08-28 ENCOUNTER — APPOINTMENT (OUTPATIENT)
Dept: GERIATRICS | Facility: CLINIC | Age: 74
End: 2019-08-28

## 2019-09-13 ENCOUNTER — RX RENEWAL (OUTPATIENT)
Age: 74
End: 2019-09-13

## 2019-10-03 ENCOUNTER — APPOINTMENT (OUTPATIENT)
Dept: GERIATRICS | Facility: CLINIC | Age: 74
End: 2019-10-03

## 2019-10-11 ENCOUNTER — OUTPATIENT (OUTPATIENT)
Dept: OUTPATIENT SERVICES | Facility: HOSPITAL | Age: 74
LOS: 1 days | Discharge: HOME | End: 2019-10-11

## 2019-10-11 ENCOUNTER — APPOINTMENT (OUTPATIENT)
Dept: GASTROENTEROLOGY | Facility: CLINIC | Age: 74
End: 2019-10-11

## 2019-11-04 ENCOUNTER — APPOINTMENT (OUTPATIENT)
Dept: CARDIOLOGY | Facility: CLINIC | Age: 74
End: 2019-11-04
Payer: MEDICAID

## 2019-11-04 VITALS
WEIGHT: 203 LBS | SYSTOLIC BLOOD PRESSURE: 140 MMHG | HEIGHT: 68 IN | HEART RATE: 82 BPM | DIASTOLIC BLOOD PRESSURE: 70 MMHG | BODY MASS INDEX: 30.77 KG/M2

## 2019-11-04 DIAGNOSIS — Z86.79 PERSONAL HISTORY OF OTHER DISEASES OF THE CIRCULATORY SYSTEM: ICD-10-CM

## 2019-11-04 PROCEDURE — 99214 OFFICE O/P EST MOD 30 MIN: CPT

## 2019-11-04 PROCEDURE — 93000 ELECTROCARDIOGRAM COMPLETE: CPT

## 2019-11-04 RX ORDER — AMOXICILLIN AND CLAVULANATE POTASSIUM 875; 125 MG/1; MG/1
875-125 TABLET, COATED ORAL
Qty: 14 | Refills: 0 | Status: DISCONTINUED | COMMUNITY
Start: 2019-08-08 | End: 2019-11-04

## 2019-11-06 PROBLEM — Z86.79 HISTORY OF CARDIOMEGALY: Status: RESOLVED | Noted: 2017-10-26 | Resolved: 2019-11-06

## 2019-11-06 NOTE — REASON FOR VISIT
[FreeTextEntry1] : Last seen 2017 at which time referred for possible cardiomegaly on CXR.  ECHO subsequently unremarkable.\par \par Referred again by PMD for apparent atypical chest pain in August resulting in ER visit.  Subsequent NST normal.  No recurrence.\par \par Patient is unsure why he is here and does not recall referral.\par \par Feels well.  Active.  Walks / light exercise without exertional symptoms.  No chest pain.  Breathing comfortable.  No palpitations, lightheadedness, syncope.\par \par ECHO (12/5/17): nL LVSF.  No significant valve disease.\par Adenosine NST (8/5/19): nL MPI and LVSF.\par \par Only complaint is decreased vision and hearing that came with age.  He would like to see an eye and ear doctor.

## 2019-11-06 NOTE — DISCUSSION/SUMMARY
[FreeTextEntry1] : Cont Norvasc and Lisinopril.\par No further cardiac testing required at present.\par Regular PMD follow-up / labs.\par ENT and ophthalmology referrals provided.\par Follow-up prn.

## 2019-11-06 NOTE — ASSESSMENT
[FreeTextEntry1] : Reported cardiomegaly based on CXR.\par nL biventricular size on subsequent ECHO.\par No further testing required.\par ECHO (12/5/17): nL LVSF.  No significant valve disease.\par \par Atypical CP several months ago (resolved).\par Adenosine NST (8/5/19): nL MPI and LVSF.\par \par BP controlled (repeat better).

## 2019-11-12 ENCOUNTER — MED ADMIN CHARGE (OUTPATIENT)
Age: 74
End: 2019-11-12

## 2019-11-12 ENCOUNTER — OUTPATIENT (OUTPATIENT)
Dept: OUTPATIENT SERVICES | Facility: HOSPITAL | Age: 74
LOS: 1 days | Discharge: HOME | End: 2019-11-12

## 2019-11-12 ENCOUNTER — OUTPATIENT (OUTPATIENT)
Dept: OUTPATIENT SERVICES | Facility: HOSPITAL | Age: 74
LOS: 1 days | Discharge: HOME | End: 2019-11-12
Payer: MEDICAID

## 2019-11-12 ENCOUNTER — APPOINTMENT (OUTPATIENT)
Dept: INTERNAL MEDICINE | Facility: CLINIC | Age: 74
End: 2019-11-12
Payer: MEDICAID

## 2019-11-12 PROCEDURE — 92012 INTRM OPH EXAM EST PATIENT: CPT

## 2019-11-12 PROCEDURE — 99213 OFFICE O/P EST LOW 20 MIN: CPT | Mod: GC

## 2019-11-12 PROCEDURE — 92133 CPTRZD OPH DX IMG PST SGM ON: CPT | Mod: 26

## 2019-11-14 DIAGNOSIS — H35.033 HYPERTENSIVE RETINOPATHY, BILATERAL: ICD-10-CM

## 2019-11-14 DIAGNOSIS — H40.1132 PRIMARY OPEN-ANGLE GLAUCOMA, BILATERAL, MODERATE STAGE: ICD-10-CM

## 2019-11-14 DIAGNOSIS — H43.813 VITREOUS DEGENERATION, BILATERAL: ICD-10-CM

## 2019-11-14 DIAGNOSIS — H25.13 AGE-RELATED NUCLEAR CATARACT, BILATERAL: ICD-10-CM

## 2019-11-20 ENCOUNTER — APPOINTMENT (OUTPATIENT)
Dept: GERIATRICS | Facility: CLINIC | Age: 74
End: 2019-11-20

## 2019-11-21 DIAGNOSIS — Z02.9 ENCOUNTER FOR ADMINISTRATIVE EXAMINATIONS, UNSPECIFIED: ICD-10-CM

## 2019-11-21 DIAGNOSIS — H25.13 AGE-RELATED NUCLEAR CATARACT, BILATERAL: ICD-10-CM

## 2019-11-21 DIAGNOSIS — H40.1132 PRIMARY OPEN-ANGLE GLAUCOMA, BILATERAL, MODERATE STAGE: ICD-10-CM

## 2019-11-21 DIAGNOSIS — H43.813 VITREOUS DEGENERATION, BILATERAL: ICD-10-CM

## 2019-11-21 DIAGNOSIS — H35.033 HYPERTENSIVE RETINOPATHY, BILATERAL: ICD-10-CM

## 2019-11-21 DIAGNOSIS — Z23 ENCOUNTER FOR IMMUNIZATION: ICD-10-CM

## 2020-02-27 ENCOUNTER — APPOINTMENT (OUTPATIENT)
Dept: GERIATRICS | Facility: CLINIC | Age: 75
End: 2020-02-27
Payer: MEDICAID

## 2020-02-27 ENCOUNTER — OUTPATIENT (OUTPATIENT)
Dept: OUTPATIENT SERVICES | Facility: HOSPITAL | Age: 75
LOS: 1 days | Discharge: HOME | End: 2020-02-27

## 2020-02-27 VITALS
TEMPERATURE: 97.5 F | SYSTOLIC BLOOD PRESSURE: 148 MMHG | HEART RATE: 69 BPM | DIASTOLIC BLOOD PRESSURE: 77 MMHG | BODY MASS INDEX: 30.16 KG/M2 | WEIGHT: 199 LBS | HEIGHT: 68 IN

## 2020-02-27 PROCEDURE — 99213 OFFICE O/P EST LOW 20 MIN: CPT | Mod: GC

## 2020-02-27 NOTE — HISTORY OF PRESENT ILLNESS
[FreeTextEntry1] : follow up [de-identified] : 73 yo male PMHx HTN DLD presented for follow up. Denies any symptoms. Wants his eyedrops to be renewed by ophto.\par Colonoscopy UTD, flu shot UTD. nonsmoker.

## 2020-02-27 NOTE — PHYSICAL EXAM
[No Acute Distress] : no acute distress [Well Nourished] : well nourished [Well Developed] : well developed [Well-Appearing] : well-appearing [PERRL] : pupils equal round and reactive to light [Normal Sclera/Conjunctiva] : normal sclera/conjunctiva [Normal Outer Ear/Nose] : the outer ears and nose were normal in appearance [EOMI] : extraocular movements intact [Normal Oropharynx] : the oropharynx was normal [No JVD] : no jugular venous distention [No Lymphadenopathy] : no lymphadenopathy [Supple] : supple [No Respiratory Distress] : no respiratory distress  [Thyroid Normal, No Nodules] : the thyroid was normal and there were no nodules present [Clear to Auscultation] : lungs were clear to auscultation bilaterally [No Accessory Muscle Use] : no accessory muscle use [Regular Rhythm] : with a regular rhythm [Normal Rate] : normal rate  [Normal S1, S2] : normal S1 and S2 [No Murmur] : no murmur heard [No Abdominal Bruit] : a ~M bruit was not heard ~T in the abdomen [No Varicosities] : no varicosities [No Carotid Bruits] : no carotid bruits [No Palpable Aorta] : no palpable aorta [Pedal Pulses Present] : the pedal pulses are present [No Edema] : there was no peripheral edema [No Extremity Clubbing/Cyanosis] : no extremity clubbing/cyanosis [Non Tender] : non-tender [Soft] : abdomen soft [Non-distended] : non-distended [No Masses] : no abdominal mass palpated [Normal Bowel Sounds] : normal bowel sounds [No HSM] : no HSM [Normal Anterior Cervical Nodes] : no anterior cervical lymphadenopathy [Normal Posterior Cervical Nodes] : no posterior cervical lymphadenopathy [No CVA Tenderness] : no CVA  tenderness [No Joint Swelling] : no joint swelling [No Spinal Tenderness] : no spinal tenderness [Coordination Grossly Intact] : coordination grossly intact [No Rash] : no rash [Grossly Normal Strength/Tone] : grossly normal strength/tone [No Focal Deficits] : no focal deficits [Normal Gait] : normal gait [Deep Tendon Reflexes (DTR)] : deep tendon reflexes were 2+ and symmetric [Normal Affect] : the affect was normal [Normal Insight/Judgement] : insight and judgment were intact

## 2020-02-27 NOTE — ASSESSMENT
[FreeTextEntry1] : 73 yo AA male with HTN and hx of cardiomegaly on CXR but normal TTE presented for follow up.\par \par Chest pain resolved was atypical, stress test was negative\par TTE was normal\par ECHO (12/5/17): nL LVSF. No significant valve disease.\par Adenosine NST (8/5/19): nL MPI and LVSF.\par \par \par HTN c/w current medications, instructed to take meds prior appts\par \par DM type 2\par follow up A1c\par - counselling provided, will monitor, diet modification\par : will start Lipitor 20 mg q hs\par \par HCM:\par - CRC screening: last colono \par - ophtho referral\par - Pt received flu vaccine this year\par - Return in 6 months with blood work\par

## 2020-02-27 NOTE — END OF VISIT
[] : Resident [FreeTextEntry3] : SEEN WITH [facility] TEAM; patient UTD with colonoscopy and flu vaccine; had recent optometry evaluation  - needs to fl/u with optho; BP stable on current medications; feels well on atorvastatin 40 mg daily - last labs in 1/2019 - will re-order; is hemodynamically stable; no chest pain or chest pressure; will redirect care based on lab results.

## 2020-03-02 DIAGNOSIS — E55.9 VITAMIN D DEFICIENCY, UNSPECIFIED: ICD-10-CM

## 2020-03-02 DIAGNOSIS — K21.9 GASTRO-ESOPHAGEAL REFLUX DISEASE WITHOUT ESOPHAGITIS: ICD-10-CM

## 2020-03-02 DIAGNOSIS — N40.0 BENIGN PROSTATIC HYPERPLASIA WITHOUT LOWER URINARY TRACT SYMPTOMS: ICD-10-CM

## 2020-03-02 DIAGNOSIS — F32.9 MAJOR DEPRESSIVE DISORDER, SINGLE EPISODE, UNSPECIFIED: ICD-10-CM

## 2020-03-10 NOTE — H&P ADULT - NSHPPHYSICALEXAM_GEN_ALL_CORE
Name band;
PHYSICAL EXAM:   Vital Signs:  Vital Signs Last 24 Hrs  T(C): 37.1 (21 Sep 2018 12:47), Max: 37.1 (21 Sep 2018 12:47)  T(F): 98.8 (21 Sep 2018 12:47), Max: 98.8 (21 Sep 2018 12:47)  HR: 67 (21 Sep 2018 12:47) (67 - 67)  BP: 180/87 (21 Sep 2018 12:47) (180/87 - 180/87)  BP(mean): --  RR: 18 (21 Sep 2018 12:47) (18 - 18)  SpO2: --  Daily Height in cm: 180.34 (21 Sep 2018 12:47)    Daily     GENERAL:  Appears stated age, well-groomed, well-nourished, no distress  HEENT:  NC/AT,  conjunctivae clear and pink, no thyromegaly, nodules, adenopathy, no JVD, sclera -anicteric  CHEST:  Full & symmetric excursion, no increased effort, breath sounds clear  HEART:  Regular rhythm, S1, S2, no murmur/rub/S3/S4, no abdominal bruit, no edema  ABDOMEN:  Soft, non-tender, non-distended, normoactive bowel sounds,  no masses ,no hepato-splenomegaly, no signs of chronic liver disease  EXTEREMITIES:  no cyanosis,clubbing or edema  SKIN:  No rash/erythema/ecchymoses/petechiae/wounds/abscess/warm/dry  NEURO:  Alert, oriented, no asterixis, no tremor, no encephalopathy

## 2020-04-28 ENCOUNTER — EMERGENCY (EMERGENCY)
Facility: HOSPITAL | Age: 75
LOS: 0 days | Discharge: HOME | End: 2020-04-28
Attending: EMERGENCY MEDICINE | Admitting: EMERGENCY MEDICINE
Payer: MEDICAID

## 2020-04-28 VITALS
HEART RATE: 64 BPM | DIASTOLIC BLOOD PRESSURE: 96 MMHG | SYSTOLIC BLOOD PRESSURE: 205 MMHG | RESPIRATION RATE: 18 BRPM | WEIGHT: 186.95 LBS | TEMPERATURE: 97 F | OXYGEN SATURATION: 99 %

## 2020-04-28 VITALS
TEMPERATURE: 97 F | HEART RATE: 63 BPM | RESPIRATION RATE: 18 BRPM | SYSTOLIC BLOOD PRESSURE: 179 MMHG | OXYGEN SATURATION: 100 % | DIASTOLIC BLOOD PRESSURE: 82 MMHG

## 2020-04-28 DIAGNOSIS — M79.10 MYALGIA, UNSPECIFIED SITE: ICD-10-CM

## 2020-04-28 DIAGNOSIS — I10 ESSENTIAL (PRIMARY) HYPERTENSION: ICD-10-CM

## 2020-04-28 DIAGNOSIS — R07.89 OTHER CHEST PAIN: ICD-10-CM

## 2020-04-28 DIAGNOSIS — R53.1 WEAKNESS: ICD-10-CM

## 2020-04-28 DIAGNOSIS — R50.9 FEVER, UNSPECIFIED: ICD-10-CM

## 2020-04-28 LAB
ALBUMIN SERPL ELPH-MCNC: 4.6 G/DL — SIGNIFICANT CHANGE UP (ref 3.5–5.2)
ALP SERPL-CCNC: 95 U/L — SIGNIFICANT CHANGE UP (ref 30–115)
ALT FLD-CCNC: 22 U/L — SIGNIFICANT CHANGE UP (ref 0–41)
ANION GAP SERPL CALC-SCNC: 12 MMOL/L — SIGNIFICANT CHANGE UP (ref 7–14)
AST SERPL-CCNC: 24 U/L — SIGNIFICANT CHANGE UP (ref 0–41)
BASOPHILS # BLD AUTO: 0.03 K/UL — SIGNIFICANT CHANGE UP (ref 0–0.2)
BASOPHILS NFR BLD AUTO: 0.6 % — SIGNIFICANT CHANGE UP (ref 0–1)
BILIRUB SERPL-MCNC: 0.6 MG/DL — SIGNIFICANT CHANGE UP (ref 0.2–1.2)
BUN SERPL-MCNC: 10 MG/DL — SIGNIFICANT CHANGE UP (ref 10–20)
CALCIUM SERPL-MCNC: 9.5 MG/DL — SIGNIFICANT CHANGE UP (ref 8.5–10.1)
CHLORIDE SERPL-SCNC: 101 MMOL/L — SIGNIFICANT CHANGE UP (ref 98–110)
CK SERPL-CCNC: 173 U/L — SIGNIFICANT CHANGE UP (ref 0–225)
CO2 SERPL-SCNC: 29 MMOL/L — SIGNIFICANT CHANGE UP (ref 17–32)
CREAT SERPL-MCNC: 0.9 MG/DL — SIGNIFICANT CHANGE UP (ref 0.7–1.5)
EOSINOPHIL # BLD AUTO: 0.09 K/UL — SIGNIFICANT CHANGE UP (ref 0–0.7)
EOSINOPHIL NFR BLD AUTO: 1.9 % — SIGNIFICANT CHANGE UP (ref 0–8)
GLUCOSE SERPL-MCNC: 102 MG/DL — HIGH (ref 70–99)
HCT VFR BLD CALC: 48.7 % — SIGNIFICANT CHANGE UP (ref 42–52)
HGB BLD-MCNC: 16 G/DL — SIGNIFICANT CHANGE UP (ref 14–18)
IMM GRANULOCYTES NFR BLD AUTO: 0 % — LOW (ref 0.1–0.3)
LYMPHOCYTES # BLD AUTO: 1.64 K/UL — SIGNIFICANT CHANGE UP (ref 1.2–3.4)
LYMPHOCYTES # BLD AUTO: 34.1 % — SIGNIFICANT CHANGE UP (ref 20.5–51.1)
MAGNESIUM SERPL-MCNC: 2.1 MG/DL — SIGNIFICANT CHANGE UP (ref 1.8–2.4)
MCHC RBC-ENTMCNC: 26.7 PG — LOW (ref 27–31)
MCHC RBC-ENTMCNC: 32.9 G/DL — SIGNIFICANT CHANGE UP (ref 32–37)
MCV RBC AUTO: 81.2 FL — SIGNIFICANT CHANGE UP (ref 80–94)
MONOCYTES # BLD AUTO: 0.4 K/UL — SIGNIFICANT CHANGE UP (ref 0.1–0.6)
MONOCYTES NFR BLD AUTO: 8.3 % — SIGNIFICANT CHANGE UP (ref 1.7–9.3)
NEUTROPHILS # BLD AUTO: 2.65 K/UL — SIGNIFICANT CHANGE UP (ref 1.4–6.5)
NEUTROPHILS NFR BLD AUTO: 55.1 % — SIGNIFICANT CHANGE UP (ref 42.2–75.2)
NRBC # BLD: 0 /100 WBCS — SIGNIFICANT CHANGE UP (ref 0–0)
NT-PROBNP SERPL-SCNC: 27 PG/ML — SIGNIFICANT CHANGE UP (ref 0–300)
PLATELET # BLD AUTO: 208 K/UL — SIGNIFICANT CHANGE UP (ref 130–400)
POTASSIUM SERPL-MCNC: 3.8 MMOL/L — SIGNIFICANT CHANGE UP (ref 3.5–5)
POTASSIUM SERPL-SCNC: 3.8 MMOL/L — SIGNIFICANT CHANGE UP (ref 3.5–5)
PROT SERPL-MCNC: 7 G/DL — SIGNIFICANT CHANGE UP (ref 6–8)
RBC # BLD: 6 M/UL — SIGNIFICANT CHANGE UP (ref 4.7–6.1)
RBC # FLD: 14.6 % — HIGH (ref 11.5–14.5)
SODIUM SERPL-SCNC: 142 MMOL/L — SIGNIFICANT CHANGE UP (ref 135–146)
TROPONIN T SERPL-MCNC: <0.01 NG/ML — SIGNIFICANT CHANGE UP
TROPONIN T SERPL-MCNC: <0.01 NG/ML — SIGNIFICANT CHANGE UP
WBC # BLD: 4.81 K/UL — SIGNIFICANT CHANGE UP (ref 4.8–10.8)
WBC # FLD AUTO: 4.81 K/UL — SIGNIFICANT CHANGE UP (ref 4.8–10.8)

## 2020-04-28 PROCEDURE — 75574 CT ANGIO HRT W/3D IMAGE: CPT | Mod: 26

## 2020-04-28 PROCEDURE — 99220: CPT

## 2020-04-28 PROCEDURE — 71046 X-RAY EXAM CHEST 2 VIEWS: CPT | Mod: 26

## 2020-04-28 PROCEDURE — 93010 ELECTROCARDIOGRAM REPORT: CPT

## 2020-04-28 RX ORDER — LISINOPRIL 2.5 MG/1
10 TABLET ORAL ONCE
Refills: 0 | Status: COMPLETED | OUTPATIENT
Start: 2020-04-28 | End: 2020-04-28

## 2020-04-28 RX ORDER — ASPIRIN/CALCIUM CARB/MAGNESIUM 324 MG
1 TABLET ORAL
Qty: 30 | Refills: 0
Start: 2020-04-28 | End: 2021-04-07

## 2020-04-28 RX ORDER — HYDRALAZINE HCL 50 MG
10 TABLET ORAL ONCE
Refills: 0 | Status: DISCONTINUED | OUTPATIENT
Start: 2020-04-28 | End: 2020-04-28

## 2020-04-28 RX ORDER — HYDRALAZINE HCL 50 MG
10 TABLET ORAL ONCE
Refills: 0 | Status: COMPLETED | OUTPATIENT
Start: 2020-04-28 | End: 2020-04-28

## 2020-04-28 RX ORDER — SIMVASTATIN 20 MG/1
1 TABLET, FILM COATED ORAL
Qty: 14 | Refills: 0
Start: 2020-04-28 | End: 2020-05-11

## 2020-04-28 RX ORDER — ASPIRIN/CALCIUM CARB/MAGNESIUM 324 MG
1 TABLET ORAL
Qty: 14 | Refills: 0
Start: 2020-04-28 | End: 2020-05-11

## 2020-04-28 RX ORDER — ACETAMINOPHEN 500 MG
650 TABLET ORAL ONCE
Refills: 0 | Status: COMPLETED | OUTPATIENT
Start: 2020-04-28 | End: 2020-04-28

## 2020-04-28 RX ADMIN — Medication 10 MILLIGRAM(S): at 09:53

## 2020-04-28 RX ADMIN — Medication 650 MILLIGRAM(S): at 08:18

## 2020-04-28 RX ADMIN — LISINOPRIL 10 MILLIGRAM(S): 2.5 TABLET ORAL at 08:28

## 2020-04-28 NOTE — ED PROVIDER NOTE - CLINICAL SUMMARY MEDICAL DECISION MAKING FREE TEXT BOX
pt aware of all labs and imaging, due to chest pain agrees with plan for admission to obs, ekg reviewed, new twi in III, no sophie or depression, first troponin negative, cxr reviewed, pt's bp medication was given in the ed, agree with plan for obs, obs team aware of pt and admission.

## 2020-04-28 NOTE — ED PROVIDER NOTE - ATTENDING CONTRIBUTION TO CARE
74 y/o m w/ pmhx of htn, has not taken his lisinopril for 4 days, does not know why, presents for generalized malaise, worse in his legs, for ~ 1 week, associated with subjective fever, mid-sternal chest pain, constant, x 4 days, pressure like, non-radiating. denies chills, n/v,  sob, pleuritic cp, palpitations, diaphoresis, cough, tinnitus, ear pain, hearing loss, neck pain/stiffness, back pain, photophobia/phonophobia, blurry vision/visual changes, abd pain, diarrhea, constipation, melena/brbpr, urinary symptoms, trauma, numbness/tingling, HA, syncope, sick contacts, recent travel or rash. not a smoker.     on exam:   Constitutional: wdwn male sitting on stretcher in nad.  Skin: no rash, no signs of trauma:  HEENT: PERRL, EOM intact, no nystagmus, mmm. No tongue deviation.   NECK and BACK: neck supple, no spinous ttp to neck or back, FROM, no palpable shelves or step offs, no meningeal signs.  CARDIO: regular rate, radial pulses 2/4 b/l. No pain to palpation to chest wall.   Lungs: Ctabl w/ breath sounds present b/l, no wheezing or crackles, no accessory muscle use, no tachypnea, no stridor  ABD: BS present throughout all 4 quadrants, abd soft, nd, nt, no rebound tenderness or guarding, no cvat,;  EXT: FROM of upper and lower ext, no drift, no calf pain/swelling/erythema.  NEURO: AAOx3. Motor 5/5 and sensation intact throughout upper and lower ext. CN II-XII intact. No facial droop or slurring of speech. (-) Pronator, no dysmetria w/ ftn or rapid alternating fine movements, ambulating with no ataxia or difficulty. NIH O.

## 2020-04-28 NOTE — ED PROVIDER NOTE - PROGRESS NOTE DETAILS
pt aware of all labs and imaging, trop negative, ekg with no sophie or depressions, chest pain intermittent at this time, bp meds given, cxr negative, no respiratory symptoms at this time, obs team aware of pt and admission.

## 2020-04-28 NOTE — ED CDU PROVIDER INITIAL DAY NOTE - NS ED ROS FT
Eyes:  No visual changes, eye pain or discharge.  ENMT:  No hearing changes, pain, discharge or infections. No neck pain or stiffness.  Cardiac: Chest pain. No SOB or edema. No chest pain with exertion.  Respiratory:  No cough or respiratory distress. No hemoptysis. No history of asthma or RAD.  GI:  No nausea, vomiting, diarrhea or abdominal pain.  :  No dysuria, frequency or burning.  MS:  No myalgia, muscle weakness, joint pain or back pain.  Neuro:  No headache or weakness.  No LOC.  Skin:  No skin rash.   Endocrine: No history of thyroid disease or diabetes.

## 2020-04-28 NOTE — ED ADULT NURSE NOTE - NSFALLRSKHARMRISK_ED_ALL_ED
1. Wash gently with washcoth three times a day    2. Rub cream into lesions three times daily after wash.    3. Call if not improving one week.  
no

## 2020-04-28 NOTE — ED CDU PROVIDER INITIAL DAY NOTE - PROGRESS NOTE DETAILS
Pt stable, chest pain improved at this time. Went for CCTA, awaiting results. Exam wnl. Chest pain completely resolved at this time. Patient sitting comfortably eating lunch. Given return precautions and instructed to f/u with cardiology. Prescribed statin and aspirin for preventative care.

## 2020-04-28 NOTE — ED CDU PROVIDER DISPOSITION NOTE - CARE PROVIDER_API CALL
Vance Villarreal)  Cardiovascular Disease; Interventional Cardiology  [address] [place] [address]  Phone: [phone]  Fax: [phone]  Follow Up Time: 4-6 Days

## 2020-04-28 NOTE — ED CDU PROVIDER INITIAL DAY NOTE - PHYSICAL EXAMINATION
CONSTITUTIONAL: Well-developed; well-nourished; in no acute distress.   SKIN: warm, dry  HEAD: Normocephalic; atraumatic.  EYES: PERRL, EOMI, normal sclera and conjunctiva   ENT: No nasal discharge; airway clear.  NECK: Supple; non tender.  CARD: S1, S2 normal; no murmurs, gallops, or rubs. Regular rate and rhythm.   RESP: No wheezes, rales or rhonchi.  ABD: soft ntnd  EXT: Normal ROM.  No clubbing, cyanosis or edema.   LYMPH: No acute cervical adenopathy.  NEURO: Alert, oriented, grossly unremarkable. No focal deficits or extremity weakness. No cranial nerve deficits.  PSYCH: Cooperative, appropriate.

## 2020-04-28 NOTE — ED PROVIDER NOTE - PLAN OF CARE
Plan: EKG, CXR, labs, pain control, will give dose of Lisinopril in ed, will continue to monitor and reassess.

## 2020-04-28 NOTE — ED PROVIDER NOTE - OBJECTIVE STATEMENT
75M hx of HTN presents with weakness. Patient notes that he has been feeling slightly weak in his legs for the past 1-2 weeks, denies any falls or trauma, no recent travel or sick contacts. Patient endorses mid-sternal chest pain that has been ongoing for the past 4 days, intermittent, lasting a few minutes at a time, no associated N/V/SOB/arm pain/back pain/abdominal pain/syncope. Notes that he has not been taking his lisinopril for the past 4 days, but does not give a specific reason why.

## 2020-04-28 NOTE — ED CDU PROVIDER INITIAL DAY NOTE - FAMILY HISTORY
Father  Still living? Unknown  No family history of cardiac disease, Age at diagnosis: Age Unknown  No family history of cerebrovascular accident (CVA), Age at diagnosis: Age Unknown     Mother  Still living? Unknown  No family history of cardiac disease, Age at diagnosis: Age Unknown  No family history of cerebrovascular accident (CVA), Age at diagnosis: Age Unknown     Sibling  Still living? Unknown  No family history of cardiac disease, Age at diagnosis: Age Unknown  No family history of cerebrovascular accident (CVA), Age at diagnosis: Age Unknown

## 2020-04-28 NOTE — ED CDU PROVIDER DISPOSITION NOTE - ATTENDING CONTRIBUTION TO CARE
75yoM with h/o HTN on lisinopril (off x 4 days), presents with flu-like symptoms, myalgias, subjective fever, HTN and given lisinopril/hydral for this, sent to obs for ACS w/u. On my assessment pt reports CP and HA x 3 wks since onset of symptoms, pressure like, midsternal, nonexertional. Denies cough, vomiting, diarrhea, dysuria, known covid contacts, leg pain/swelling, and all other symptoms. On exam, afebrile, hemodynamically stable, saturating well, NAD, well appearing, head NCAT, EOMI grossly, anicteric, MMM, no JVD, RRR, nml S1/S2, no m/r/g, lungs CTAB, no w/r/r, abd soft, NT, ND, nml BS, no rebound or guarding, AAO, CN's 3-12 grossly intact, WEAVER spontaneously, no leg cyanosis or edema, skin warm, well perfused, no rashes or hives. Character low suspicion for ICH or carotid dissection and no focal or localizing neuro findings. Character low suspicion for aortic dissection and no murmur or pulse asymmetry. Character low suspicion for PE and no associated signs of DVT. No e/o fluid overload. Character low suspicion for ACS and CTCA CAD-RADS 1. Character more c/w viral syndrome. Patient is well appearing, NAD, afebrile, hemodynamically stable. Any available tests and studies were discussed with patient. Discharged with instructions in further symptomatic care, return precautions, and need for cardio f/u.

## 2020-04-28 NOTE — ED PROVIDER NOTE - PHYSICAL EXAMINATION
Vital Signs: I have reviewed the initial vital signs.  Constitutional: well-nourished, appears stated age, no acute distress.  HEENT: Airway patent, protected.   CV: regular rate, regular rhythm, well-perfused extremities.  Lungs: BCTA, no increased WOB.  ABD: soft, NTND, no guarding or rebound, no pulsatile mass, no hernias.   MSK: Chest nontender. Back nontender in TLS spine or to b/l bony structures or flanks. Ext nontender, nl rom, no deformity.   INTEG: Skin warm, dry, no rash.  NEURO: A&Ox3, moving all extremities, normal speech, normal strength 5/5 bilaterally in upper and lower extremities.   PSYCH: Calm, cooperative, normal affect and interaction.

## 2020-04-28 NOTE — ED PROVIDER NOTE - CARE PLAN
Assessment and plan of treatment:	Plan: EKG, CXR, labs, pain control, will give dose of Lisinopril in ed, will continue to monitor and reassess. Principal Discharge DX:	Chest pain  Assessment and plan of treatment:	Plan: EKG, CXR, labs, pain control, will give dose of Lisinopril in ed, will continue to monitor and reassess.  Secondary Diagnosis:	HTN (hypertension)

## 2020-04-28 NOTE — ED PROVIDER NOTE - NS ED ROS FT
Review of Systems    Constitutional: (-) fever  Cardiovascular: (+) chest pain, (-) syncope  Respiratory: (-) cough, (-) shortness of breath  Gastrointestinal: (-) vomiting, (-) diarrhea, (-) abdominal pain  Musculoskeletal: (-) neck pain, (-) back pain, (+) joint pain  Integumentary: (-) rash, (-) edema  Neurological: (-) headache, (-) altered mental status    Except as documented in the HPI, all other systems are negative.

## 2020-04-28 NOTE — ED CDU PROVIDER DISPOSITION NOTE - CLINICAL COURSE
75yoM with h/o HTN on lisinopril (off x 4 days), presents with flu-like symptoms, myalgias, subjective fever, HTN and given lisinopril/hydral for this, sent to obs for ACS w/u. On my assessment pt reports CP and HA x 3 wks since onset of symptoms, pressure like, midsternal, nonexertional. Denies cough, vomiting, diarrhea, dysuria, known covid contacts, leg pain/swelling, and all other symptoms. On exam, afebrile, hemodynamically stable, saturating well, NAD, well appearing, head NCAT, EOMI grossly, anicteric, MMM, no JVD, RRR, nml S1/S2, no m/r/g, lungs CTAB, no w/r/r, abd soft, NT, ND, nml BS, no rebound or guarding, AAO, CN's 3-12 grossly intact, WEAVER spontaneously, no leg cyanosis or edema, skin warm, well perfused, no rashes or hives. Character low suspicion for ICH or carotid dissection and no focal or localizing neuro findings. Character low suspicion for aortic dissection and no murmur or pulse asymmetry. Character low suspicion for PE and no associated signs of DVT. No e/o fluid overload. Character low suspicion for ACS and CTCA __________. Character more c/w viral syndrome. 75yoM with h/o HTN on lisinopril (off x 4 days), presents with flu-like symptoms, myalgias, subjective fever, HTN and given lisinopril/hydral for this, sent to obs for ACS w/u. On my assessment pt reports CP and HA x 3 wks since onset of symptoms, pressure like, midsternal, nonexertional. Denies cough, vomiting, diarrhea, dysuria, known covid contacts, leg pain/swelling, and all other symptoms. On exam, afebrile, hemodynamically stable, saturating well, NAD, well appearing, head NCAT, EOMI grossly, anicteric, MMM, no JVD, RRR, nml S1/S2, no m/r/g, lungs CTAB, no w/r/r, abd soft, NT, ND, nml BS, no rebound or guarding, AAO, CN's 3-12 grossly intact, WEAVER spontaneously, no leg cyanosis or edema, skin warm, well perfused, no rashes or hives. Character low suspicion for ICH or carotid dissection and no focal or localizing neuro findings. Character low suspicion for aortic dissection and no murmur or pulse asymmetry. Character low suspicion for PE and no associated signs of DVT. No e/o fluid overload. Character low suspicion for ACS and CTCA CAD-RADS 1. Character more c/w viral syndrome. Patient is well appearing, NAD, afebrile, hemodynamically stable. Any available tests and studies were discussed with patient. Discharged with instructions in further symptomatic care, return precautions, and need for cardio f/u.

## 2020-04-28 NOTE — ED CDU PROVIDER INITIAL DAY NOTE - OBJECTIVE STATEMENT
75 y M pmh htn pw weakness. Mild weakness in his legs for the past few weeks. Gradual onset, no inciting incident. Associated with mid-sternal chest pain that began about 1 week ago. Progressively worsening, intermittent lasting a few minutes at a time. Denies n/v, sob, back pain, palpitations, leg swelling.

## 2020-04-28 NOTE — ED CDU PROVIDER DISPOSITION NOTE - PATIENT PORTAL LINK FT
You can access the FollowMyHealth Patient Portal offered by [facility] by registering at the following website: [email or web address]. By joining [employer]’s FollowMyHealth portal, you will also be able to view your health information using other applications (apps) compatible with our system.

## 2020-04-28 NOTE — ED CDU PROVIDER INITIAL DAY NOTE - NS ED ATTENDING STATEMENT MOD
I have personally seen and examined this patient.  I have fully participated in the care of this patient. I have reviewed all pertinent clinical information, including history, physical exam, plan and the Resident’s note and agree except as noted. Patient/Caregiver provided printed discharge information.

## 2020-05-11 ENCOUNTER — EMERGENCY (EMERGENCY)
Facility: HOSPITAL | Age: 75
LOS: 0 days | Discharge: HOME | End: 2020-05-11
Attending: EMERGENCY MEDICINE | Admitting: EMERGENCY MEDICINE
Payer: MEDICAID

## 2020-05-11 VITALS
HEART RATE: 85 BPM | DIASTOLIC BLOOD PRESSURE: 89 MMHG | OXYGEN SATURATION: 100 % | TEMPERATURE: 99 F | RESPIRATION RATE: 18 BRPM | SYSTOLIC BLOOD PRESSURE: 136 MMHG

## 2020-05-11 DIAGNOSIS — M79.10 MYALGIA, UNSPECIFIED SITE: ICD-10-CM

## 2020-05-11 DIAGNOSIS — Z00.00 ENCOUNTER FOR GENERAL ADULT MEDICAL EXAMINATION WITHOUT ABNORMAL FINDINGS: ICD-10-CM

## 2020-05-11 DIAGNOSIS — K59.00 CONSTIPATION, UNSPECIFIED: ICD-10-CM

## 2020-05-11 LAB
ALBUMIN SERPL ELPH-MCNC: 4.6 G/DL — SIGNIFICANT CHANGE UP (ref 3.5–5.2)
ALP SERPL-CCNC: 98 U/L — SIGNIFICANT CHANGE UP (ref 30–115)
ALT FLD-CCNC: 25 U/L — SIGNIFICANT CHANGE UP (ref 0–41)
ANION GAP SERPL CALC-SCNC: 16 MMOL/L — HIGH (ref 7–14)
AST SERPL-CCNC: 30 U/L — SIGNIFICANT CHANGE UP (ref 0–41)
BASOPHILS # BLD AUTO: 0.04 K/UL — SIGNIFICANT CHANGE UP (ref 0–0.2)
BASOPHILS NFR BLD AUTO: 0.8 % — SIGNIFICANT CHANGE UP (ref 0–1)
BILIRUB DIRECT SERPL-MCNC: <0.2 MG/DL — SIGNIFICANT CHANGE UP (ref 0–0.2)
BILIRUB INDIRECT FLD-MCNC: >0.4 MG/DL — SIGNIFICANT CHANGE UP (ref 0.2–1.2)
BILIRUB SERPL-MCNC: 0.6 MG/DL — SIGNIFICANT CHANGE UP (ref 0.2–1.2)
BUN SERPL-MCNC: 10 MG/DL — SIGNIFICANT CHANGE UP (ref 10–20)
CALCIUM SERPL-MCNC: 9.7 MG/DL — SIGNIFICANT CHANGE UP (ref 8.5–10.1)
CHLORIDE SERPL-SCNC: 101 MMOL/L — SIGNIFICANT CHANGE UP (ref 98–110)
CO2 SERPL-SCNC: 21 MMOL/L — SIGNIFICANT CHANGE UP (ref 17–32)
CREAT SERPL-MCNC: 0.9 MG/DL — SIGNIFICANT CHANGE UP (ref 0.7–1.5)
EOSINOPHIL # BLD AUTO: 0.11 K/UL — SIGNIFICANT CHANGE UP (ref 0–0.7)
EOSINOPHIL NFR BLD AUTO: 2.2 % — SIGNIFICANT CHANGE UP (ref 0–8)
GLUCOSE SERPL-MCNC: 123 MG/DL — HIGH (ref 70–99)
HCT VFR BLD CALC: 45.8 % — SIGNIFICANT CHANGE UP (ref 42–52)
HGB BLD-MCNC: 15.5 G/DL — SIGNIFICANT CHANGE UP (ref 14–18)
IMM GRANULOCYTES NFR BLD AUTO: 0.2 % — SIGNIFICANT CHANGE UP (ref 0.1–0.3)
LACTATE SERPL-SCNC: 1.4 MMOL/L — SIGNIFICANT CHANGE UP (ref 0.7–2)
LIDOCAIN IGE QN: 39 U/L — SIGNIFICANT CHANGE UP (ref 7–60)
LYMPHOCYTES # BLD AUTO: 1.87 K/UL — SIGNIFICANT CHANGE UP (ref 1.2–3.4)
LYMPHOCYTES # BLD AUTO: 38.2 % — SIGNIFICANT CHANGE UP (ref 20.5–51.1)
MCHC RBC-ENTMCNC: 27 PG — SIGNIFICANT CHANGE UP (ref 27–31)
MCHC RBC-ENTMCNC: 33.8 G/DL — SIGNIFICANT CHANGE UP (ref 32–37)
MCV RBC AUTO: 79.7 FL — LOW (ref 80–94)
MONOCYTES # BLD AUTO: 0.44 K/UL — SIGNIFICANT CHANGE UP (ref 0.1–0.6)
MONOCYTES NFR BLD AUTO: 9 % — SIGNIFICANT CHANGE UP (ref 1.7–9.3)
NEUTROPHILS # BLD AUTO: 2.42 K/UL — SIGNIFICANT CHANGE UP (ref 1.4–6.5)
NEUTROPHILS NFR BLD AUTO: 49.6 % — SIGNIFICANT CHANGE UP (ref 42.2–75.2)
NRBC # BLD: 0 /100 WBCS — SIGNIFICANT CHANGE UP (ref 0–0)
NT-PROBNP SERPL-SCNC: 7 PG/ML — SIGNIFICANT CHANGE UP (ref 0–300)
PLATELET # BLD AUTO: 212 K/UL — SIGNIFICANT CHANGE UP (ref 130–400)
POTASSIUM SERPL-MCNC: 3.9 MMOL/L — SIGNIFICANT CHANGE UP (ref 3.5–5)
POTASSIUM SERPL-SCNC: 3.9 MMOL/L — SIGNIFICANT CHANGE UP (ref 3.5–5)
PROT SERPL-MCNC: 7.4 G/DL — SIGNIFICANT CHANGE UP (ref 6–8)
RBC # BLD: 5.75 M/UL — SIGNIFICANT CHANGE UP (ref 4.7–6.1)
RBC # FLD: 13.8 % — SIGNIFICANT CHANGE UP (ref 11.5–14.5)
SODIUM SERPL-SCNC: 138 MMOL/L — SIGNIFICANT CHANGE UP (ref 135–146)
TROPONIN T SERPL-MCNC: <0.01 NG/ML — SIGNIFICANT CHANGE UP
WBC # BLD: 4.89 K/UL — SIGNIFICANT CHANGE UP (ref 4.8–10.8)
WBC # FLD AUTO: 4.89 K/UL — SIGNIFICANT CHANGE UP (ref 4.8–10.8)

## 2020-05-11 PROCEDURE — 71045 X-RAY EXAM CHEST 1 VIEW: CPT | Mod: 26

## 2020-05-11 PROCEDURE — 99284 EMERGENCY DEPT VISIT MOD MDM: CPT

## 2020-05-11 PROCEDURE — 93010 ELECTROCARDIOGRAM REPORT: CPT

## 2020-05-11 PROCEDURE — 74019 RADEX ABDOMEN 2 VIEWS: CPT | Mod: 26

## 2020-05-11 NOTE — ED ADULT NURSE NOTE - OBJECTIVE STATEMENT
patient c/o body aches and states he feels like he has a fever but never took his temperature. states "I feel like I have malaria". denies any recent travel or sick contacts. denies sob

## 2020-05-11 NOTE — ED PROVIDER NOTE - CARE PROVIDER_API CALL
Yenni Capps (DO)  Geriatric Medicine; Internal Medicine  [address] [place] [address]  Phone: [phone]  Fax: [phone]  Follow Up Time:

## 2020-05-11 NOTE — ED PROVIDER NOTE - CLINICAL SUMMARY MEDICAL DECISION MAKING FREE TEXT BOX
various complaints. inability to secure outpt f/u appt prior to ED visit.  labs and imaging reviewed with pt. f/u ensured.

## 2020-05-11 NOTE — ED PROVIDER NOTE - ATTENDING CONTRIBUTION TO CARE
76 yo M htn, malaria/typhoid few years ago, followed by geriatrics, now with various complaints and inability to secure a follow with PMD.  no fever. + abd cramps, + constipation but no vomiting.  vss, nontoxic, well appearing, pink conj, anicteric, MMM, neck supple, CTAB, RRR, equal radial pulses bilat, abd soft/nt/nd, no cva tend. no calves tend, no edema, no fnd. no rashes.  a/p: labs, imaging, reassess

## 2020-05-11 NOTE — ED PROVIDER NOTE - PATIENT PORTAL LINK FT
You can access the FollowMyHealth Patient Portal offered by [facility] by registering at the following website: [email or web address]. By joining [facility]’s FollowMyHealth portal, you will also be able to view your health information using other applications (apps) compatible with our system.

## 2020-05-11 NOTE — ED PROVIDER NOTE - OBJECTIVE STATEMENT
74 y/o male with hx HTN, Malaria presents to the ED c/o " I have body pinching, chest pain, hot feeling and abdominal grumbling for 2-3 weeks. I had Malaria before and I am worried. When I checked my temperature it was 98 I haven't traveled to [place] in over 2 years." no SOB/ cough/n/v/d LBM today

## 2020-05-19 ENCOUNTER — TRANSCRIPTION ENCOUNTER (OUTPATIENT)
Age: 75
End: 2020-05-19

## 2020-05-20 ENCOUNTER — APPOINTMENT (OUTPATIENT)
Dept: GERIATRICS | Facility: HOME HEALTH | Age: 75
End: 2020-05-20
Payer: MEDICAID

## 2020-05-20 ENCOUNTER — OUTPATIENT (OUTPATIENT)
Dept: OUTPATIENT SERVICES | Facility: HOSPITAL | Age: 75
LOS: 1 days | Discharge: HOME | End: 2020-05-20

## 2020-05-20 DIAGNOSIS — Z86.13 PERSONAL HISTORY OF MALARIA: ICD-10-CM

## 2020-05-20 PROCEDURE — 99214 OFFICE O/P EST MOD 30 MIN: CPT | Mod: 95

## 2020-05-20 NOTE — HISTORY OF PRESENT ILLNESS
[FreeTextEntry1] : 74 yo AA male with HTN and Hx of cardiomegaly reports for follow up visit after ED evaluation on last week for abdominal discomfort, increased gas, "bubble in the stomach", subjective fever, did not check it, but no SOB/PEDERSEN, no cough, no N/V, no diarrhea/ melena, possible weight loss and decreased appetite. Patient has hx of malaria and was concerned it may be malaria when he went to ED. Work up there negative, blood work at baseline, no acute abnormalities. \par ROS otherwise negative\par \par \par

## 2020-05-20 NOTE — ASSESSMENT
[FreeTextEntry1] : 76 yo AA male with HTN and hx of cardiomegaly and malaria  reports for follow up visit after ED eval for stomach gas and concerns of malaria\par \par Abdominal complaints possible GERD and constipation ( mod stool burden on Xray) Unlikely recurrent malaria: no travel to [place], will start Colace 200 mg q hs daily with Protonix 40 mg, GI referral, may consider US spleen if no improvement \par Covid 19 negative\par \par Hx of cardiomegaly,  ECHO completed, seen by cardiologist, no further testing required\par \par HTN c/w current medications\par \par DM type 2 follow up HgA1c 6.1, lasst glucose level on \par c/w low sugar diet\par c/w Lipitor 20 mg q hs\par \par Thyroid enlarged on previous examination\par TSH normal\par - U/S of thyroid r/o enlarged thyroid not done, no acute issues, will monitor\par \par \par \par \par

## 2020-05-20 NOTE — REASON FOR VISIT
[Home] : at home, [unfilled] , at the time of the visit. [Other Location: e.g. Home (Enter Location, City,State)___] : at [unfilled] [Verbal consent obtained from patient] : the patient, [unfilled] [Family Member] : family member [Follow-Up] : a follow-up visit [Pre-Visit Preparation] : pre-visit preparation was done [FreeTextEntry4] : son [FreeTextEntry2] : review ED record

## 2020-05-20 NOTE — PHYSICAL EXAM
[General Appearance - Alert] : alert [General Appearance - In No Acute Distress] : in no acute distress [General Appearance - Well Nourished] : well nourished [General Appearance - Well Developed] : well developed [General Appearance - Well-Appearing] : healthy appearing [Sclera] : the sclera and conjunctiva were normal [Neck Appearance] : the appearance of the neck was normal [Jugular Venous Distention Increased] : there was no jugular-venous distention [] : no respiratory distress [Respiration, Rhythm And Depth] : normal respiratory rhythm and effort [Exaggerated Use Of Accessory Muscles For Inspiration] : no accessory muscle use [No Focal Deficits] : no focal deficits [Oriented To Time, Place, And Person] : oriented to person, place, and time [Impaired Insight] : insight and judgment were intact [Mood] : the mood was normal [Affect] : the affect was normal

## 2020-06-26 DIAGNOSIS — K21.9 GASTRO-ESOPHAGEAL REFLUX DISEASE WITHOUT ESOPHAGITIS: ICD-10-CM

## 2020-06-26 DIAGNOSIS — R50.9 FEVER, UNSPECIFIED: ICD-10-CM

## 2020-06-26 DIAGNOSIS — K59.01 SLOW TRANSIT CONSTIPATION: ICD-10-CM

## 2020-06-26 DIAGNOSIS — Z86.13 PERSONAL HISTORY OF MALARIA: ICD-10-CM

## 2020-08-20 RX ORDER — OMEPRAZOLE 40 MG/1
40 CAPSULE, DELAYED RELEASE ORAL
Qty: 30 | Refills: 1 | Status: DISCONTINUED | COMMUNITY
Start: 2019-08-08 | End: 2020-08-20

## 2020-08-20 RX ORDER — AMOXICILLIN AND CLAVULANATE POTASSIUM 875; 125 MG/1; MG/1
875-125 TABLET, COATED ORAL
Qty: 14 | Refills: 0 | Status: COMPLETED | COMMUNITY
Start: 2020-03-26 | End: 2020-04-02

## 2020-09-01 ENCOUNTER — OUTPATIENT (OUTPATIENT)
Dept: OUTPATIENT SERVICES | Facility: HOSPITAL | Age: 75
LOS: 1 days | Discharge: HOME | End: 2020-09-01
Payer: MEDICAID

## 2020-09-01 PROCEDURE — 92014 COMPRE OPH EXAM EST PT 1/>: CPT

## 2020-09-01 PROCEDURE — 92133 CPTRZD OPH DX IMG PST SGM ON: CPT | Mod: 26

## 2020-09-02 DIAGNOSIS — H25.13 AGE-RELATED NUCLEAR CATARACT, BILATERAL: ICD-10-CM

## 2020-09-02 DIAGNOSIS — H40.1232 LOW-TENSION GLAUCOMA, BILATERAL, MODERATE STAGE: ICD-10-CM

## 2020-09-02 DIAGNOSIS — D23.9 OTHER BENIGN NEOPLASM OF SKIN, UNSPECIFIED: ICD-10-CM

## 2020-09-16 ENCOUNTER — OUTPATIENT (OUTPATIENT)
Dept: OUTPATIENT SERVICES | Facility: HOSPITAL | Age: 75
LOS: 1 days | Discharge: HOME | End: 2020-09-16

## 2020-09-16 ENCOUNTER — MED ADMIN CHARGE (OUTPATIENT)
Age: 75
End: 2020-09-16

## 2020-09-16 ENCOUNTER — APPOINTMENT (OUTPATIENT)
Dept: INTERNAL MEDICINE | Facility: CLINIC | Age: 75
End: 2020-09-16

## 2020-09-16 VITALS — TEMPERATURE: 97.7 F

## 2020-09-24 ENCOUNTER — APPOINTMENT (OUTPATIENT)
Dept: GERIATRICS | Facility: CLINIC | Age: 75
End: 2020-09-24

## 2020-09-28 ENCOUNTER — APPOINTMENT (OUTPATIENT)
Dept: OPHTHALMOLOGY | Facility: CLINIC | Age: 75
End: 2020-09-28

## 2020-10-13 ENCOUNTER — OUTPATIENT (OUTPATIENT)
Dept: OUTPATIENT SERVICES | Facility: HOSPITAL | Age: 75
LOS: 1 days | Discharge: HOME | End: 2020-10-13
Payer: MEDICAID

## 2020-10-13 ENCOUNTER — APPOINTMENT (OUTPATIENT)
Dept: OPHTHALMOLOGY | Facility: CLINIC | Age: 75
End: 2020-10-13

## 2020-10-13 PROCEDURE — 99212 OFFICE O/P EST SF 10 MIN: CPT

## 2020-10-21 DIAGNOSIS — H25.13 AGE-RELATED NUCLEAR CATARACT, BILATERAL: ICD-10-CM

## 2020-10-21 DIAGNOSIS — H40.1232 LOW-TENSION GLAUCOMA, BILATERAL, MODERATE STAGE: ICD-10-CM

## 2020-10-21 DIAGNOSIS — D23.9 OTHER BENIGN NEOPLASM OF SKIN, UNSPECIFIED: ICD-10-CM

## 2020-10-27 ENCOUNTER — APPOINTMENT (OUTPATIENT)
Dept: OPHTHALMOLOGY | Facility: CLINIC | Age: 75
End: 2020-10-27

## 2020-10-27 ENCOUNTER — OUTPATIENT (OUTPATIENT)
Dept: OUTPATIENT SERVICES | Facility: HOSPITAL | Age: 75
LOS: 1 days | Discharge: HOME | End: 2020-10-27
Payer: MEDICAID

## 2020-10-27 PROCEDURE — 92012 INTRM OPH EXAM EST PATIENT: CPT

## 2020-11-04 ENCOUNTER — APPOINTMENT (OUTPATIENT)
Dept: GERIATRICS | Facility: CLINIC | Age: 75
End: 2020-11-04

## 2020-11-30 ENCOUNTER — APPOINTMENT (OUTPATIENT)
Dept: GERIATRICS | Facility: CLINIC | Age: 75
End: 2020-11-30
Payer: MEDICAID

## 2020-11-30 DIAGNOSIS — M16.11 UNILATERAL PRIMARY OSTEOARTHRITIS, RIGHT HIP: ICD-10-CM

## 2020-11-30 PROCEDURE — 99213 OFFICE O/P EST LOW 20 MIN: CPT | Mod: 95,GC

## 2020-12-02 NOTE — HISTORY OF PRESENT ILLNESS
[FreeTextEntry1] : 74 yo AA male with HTN and Hx of cardiomegaly, hx of malaria, pre-diabetes reports recurrence of the rightr leg pain and hip, radiating to the knee. He reports that more than 15 years ago he had accident and had dislocated hip, the pain recurs once in the while, this time worse for the past two weeks, mostly with standing and walking, but overall he states he is doing well, and the pain does not affect his ADLs. No new trauma, no fall, no numbness/tingling.\par Patient reports good appetite,  no abdominal pain, no N/V/C, no fever, no SOB/PEDERSEN, no cough, no diarrhea/ melena.  \par \par \par

## 2020-12-02 NOTE — ASSESSMENT
[FreeTextEntry1] : Right hip pain likely OA possibly secondary to trauma- start Voltaren gel and Tylenol\par \par BPH stable, no incontinence\par \par HTN does not routinely monitor, but will obtain BP monitor, denies any HA, no blurry vision, will check BP and call back\par \par PredM- low carb diet discussed, needs HgA1c follow up\par \par Blood work reviewed [Daily physical exercise as tolerated] : Daily physical exercise as tolerated [Medication Management] : medication management

## 2020-12-02 NOTE — PHYSICAL EXAM
[General Appearance - Alert] : alert [General Appearance - In No Acute Distress] : in no acute distress [General Appearance - Well-Appearing] : healthy appearing [Extraocular Movements] : extraocular movements were intact [] : no respiratory distress [Respiration, Rhythm And Depth] : normal respiratory rhythm and effort [Exaggerated Use Of Accessory Muscles For Inspiration] : no accessory muscle use [Edema] : there was no peripheral edema

## 2020-12-10 ENCOUNTER — TRANSCRIPTION ENCOUNTER (OUTPATIENT)
Age: 75
End: 2020-12-10

## 2020-12-21 ENCOUNTER — APPOINTMENT (OUTPATIENT)
Dept: OPHTHALMOLOGY | Facility: CLINIC | Age: 75
End: 2020-12-21

## 2021-01-19 ENCOUNTER — APPOINTMENT (OUTPATIENT)
Dept: OPHTHALMOLOGY | Facility: CLINIC | Age: 76
End: 2021-01-19

## 2021-01-19 ENCOUNTER — OUTPATIENT (OUTPATIENT)
Dept: OUTPATIENT SERVICES | Facility: HOSPITAL | Age: 76
LOS: 1 days | Discharge: HOME | End: 2021-01-19
Payer: MEDICAID

## 2021-01-19 PROCEDURE — 92012 INTRM OPH EXAM EST PATIENT: CPT

## 2021-01-27 DIAGNOSIS — H40.1232 LOW-TENSION GLAUCOMA, BILATERAL, MODERATE STAGE: ICD-10-CM

## 2021-01-27 DIAGNOSIS — H25.13 AGE-RELATED NUCLEAR CATARACT, BILATERAL: ICD-10-CM

## 2021-01-27 DIAGNOSIS — L72.0 EPIDERMAL CYST: ICD-10-CM

## 2021-02-04 ENCOUNTER — OUTPATIENT (OUTPATIENT)
Dept: OUTPATIENT SERVICES | Facility: HOSPITAL | Age: 76
LOS: 1 days | Discharge: HOME | End: 2021-02-04
Payer: MEDICAID

## 2021-02-04 ENCOUNTER — APPOINTMENT (OUTPATIENT)
Dept: OPHTHALMOLOGY | Facility: CLINIC | Age: 76
End: 2021-02-04

## 2021-02-04 PROCEDURE — 92083 EXTENDED VISUAL FIELD XM: CPT | Mod: 26

## 2021-02-08 ENCOUNTER — APPOINTMENT (OUTPATIENT)
Dept: OPHTHALMOLOGY | Facility: CLINIC | Age: 76
End: 2021-02-08

## 2021-02-08 ENCOUNTER — OUTPATIENT (OUTPATIENT)
Dept: OUTPATIENT SERVICES | Facility: HOSPITAL | Age: 76
LOS: 1 days | Discharge: HOME | End: 2021-02-08
Payer: MEDICAID

## 2021-02-08 PROCEDURE — 92012 INTRM OPH EXAM EST PATIENT: CPT

## 2021-02-09 DIAGNOSIS — H40.1232 LOW-TENSION GLAUCOMA, BILATERAL, MODERATE STAGE: ICD-10-CM

## 2021-02-09 DIAGNOSIS — H25.13 AGE-RELATED NUCLEAR CATARACT, BILATERAL: ICD-10-CM

## 2021-02-09 DIAGNOSIS — L72.0 EPIDERMAL CYST: ICD-10-CM

## 2021-03-08 ENCOUNTER — EMERGENCY (EMERGENCY)
Facility: HOSPITAL | Age: 76
LOS: 0 days | Discharge: HOME | End: 2021-03-09
Attending: EMERGENCY MEDICINE | Admitting: EMERGENCY MEDICINE
Payer: MEDICAID

## 2021-03-08 VITALS
SYSTOLIC BLOOD PRESSURE: 175 MMHG | DIASTOLIC BLOOD PRESSURE: 102 MMHG | TEMPERATURE: 98 F | RESPIRATION RATE: 18 BRPM | OXYGEN SATURATION: 97 % | HEIGHT: 71 IN | HEART RATE: 77 BPM

## 2021-03-08 DIAGNOSIS — E78.5 HYPERLIPIDEMIA, UNSPECIFIED: ICD-10-CM

## 2021-03-08 DIAGNOSIS — Z79.899 OTHER LONG TERM (CURRENT) DRUG THERAPY: ICD-10-CM

## 2021-03-08 DIAGNOSIS — I10 ESSENTIAL (PRIMARY) HYPERTENSION: ICD-10-CM

## 2021-03-08 DIAGNOSIS — R42 DIZZINESS AND GIDDINESS: ICD-10-CM

## 2021-03-08 DIAGNOSIS — Z79.01 LONG TERM (CURRENT) USE OF ANTICOAGULANTS: ICD-10-CM

## 2021-03-08 DIAGNOSIS — I65.22 OCCLUSION AND STENOSIS OF LEFT CAROTID ARTERY: ICD-10-CM

## 2021-03-08 LAB
ALBUMIN SERPL ELPH-MCNC: 4.2 G/DL — SIGNIFICANT CHANGE UP (ref 3.5–5.2)
ALP SERPL-CCNC: 111 U/L — SIGNIFICANT CHANGE UP (ref 30–115)
ALT FLD-CCNC: 23 U/L — SIGNIFICANT CHANGE UP (ref 0–41)
ANION GAP SERPL CALC-SCNC: 10 MMOL/L — SIGNIFICANT CHANGE UP (ref 7–14)
AST SERPL-CCNC: 23 U/L — SIGNIFICANT CHANGE UP (ref 0–41)
BASOPHILS # BLD AUTO: 0.04 K/UL — SIGNIFICANT CHANGE UP (ref 0–0.2)
BASOPHILS NFR BLD AUTO: 0.6 % — SIGNIFICANT CHANGE UP (ref 0–1)
BILIRUB SERPL-MCNC: 0.3 MG/DL — SIGNIFICANT CHANGE UP (ref 0.2–1.2)
BUN SERPL-MCNC: 14 MG/DL — SIGNIFICANT CHANGE UP (ref 10–20)
CALCIUM SERPL-MCNC: 8.8 MG/DL — SIGNIFICANT CHANGE UP (ref 8.5–10.1)
CHLORIDE SERPL-SCNC: 103 MMOL/L — SIGNIFICANT CHANGE UP (ref 98–110)
CO2 SERPL-SCNC: 25 MMOL/L — SIGNIFICANT CHANGE UP (ref 17–32)
CREAT SERPL-MCNC: 1 MG/DL — SIGNIFICANT CHANGE UP (ref 0.7–1.5)
EOSINOPHIL # BLD AUTO: 0.11 K/UL — SIGNIFICANT CHANGE UP (ref 0–0.7)
EOSINOPHIL NFR BLD AUTO: 1.6 % — SIGNIFICANT CHANGE UP (ref 0–8)
GLUCOSE SERPL-MCNC: 128 MG/DL — HIGH (ref 70–99)
HCT VFR BLD CALC: 43 % — SIGNIFICANT CHANGE UP (ref 42–52)
HGB BLD-MCNC: 14.1 G/DL — SIGNIFICANT CHANGE UP (ref 14–18)
IMM GRANULOCYTES NFR BLD AUTO: 0.1 % — SIGNIFICANT CHANGE UP (ref 0.1–0.3)
LYMPHOCYTES # BLD AUTO: 2.19 K/UL — SIGNIFICANT CHANGE UP (ref 1.2–3.4)
LYMPHOCYTES # BLD AUTO: 32.6 % — SIGNIFICANT CHANGE UP (ref 20.5–51.1)
MCHC RBC-ENTMCNC: 26.4 PG — LOW (ref 27–31)
MCHC RBC-ENTMCNC: 32.8 G/DL — SIGNIFICANT CHANGE UP (ref 32–37)
MCV RBC AUTO: 80.4 FL — SIGNIFICANT CHANGE UP (ref 80–94)
MONOCYTES # BLD AUTO: 0.59 K/UL — SIGNIFICANT CHANGE UP (ref 0.1–0.6)
MONOCYTES NFR BLD AUTO: 8.8 % — SIGNIFICANT CHANGE UP (ref 1.7–9.3)
NEUTROPHILS # BLD AUTO: 3.78 K/UL — SIGNIFICANT CHANGE UP (ref 1.4–6.5)
NEUTROPHILS NFR BLD AUTO: 56.3 % — SIGNIFICANT CHANGE UP (ref 42.2–75.2)
NRBC # BLD: 0 /100 WBCS — SIGNIFICANT CHANGE UP (ref 0–0)
PLATELET # BLD AUTO: 217 K/UL — SIGNIFICANT CHANGE UP (ref 130–400)
POTASSIUM SERPL-MCNC: 4.1 MMOL/L — SIGNIFICANT CHANGE UP (ref 3.5–5)
POTASSIUM SERPL-SCNC: 4.1 MMOL/L — SIGNIFICANT CHANGE UP (ref 3.5–5)
PROT SERPL-MCNC: 6.8 G/DL — SIGNIFICANT CHANGE UP (ref 6–8)
RBC # BLD: 5.35 M/UL — SIGNIFICANT CHANGE UP (ref 4.7–6.1)
RBC # FLD: 13.2 % — SIGNIFICANT CHANGE UP (ref 11.5–14.5)
SODIUM SERPL-SCNC: 138 MMOL/L — SIGNIFICANT CHANGE UP (ref 135–146)
WBC # BLD: 6.72 K/UL — SIGNIFICANT CHANGE UP (ref 4.8–10.8)
WBC # FLD AUTO: 6.72 K/UL — SIGNIFICANT CHANGE UP (ref 4.8–10.8)

## 2021-03-08 PROCEDURE — 99283 EMERGENCY DEPT VISIT LOW MDM: CPT

## 2021-03-08 PROCEDURE — 70498 CT ANGIOGRAPHY NECK: CPT | Mod: 26

## 2021-03-08 PROCEDURE — 99220: CPT

## 2021-03-08 PROCEDURE — 93010 ELECTROCARDIOGRAM REPORT: CPT

## 2021-03-08 PROCEDURE — 70450 CT HEAD/BRAIN W/O DYE: CPT | Mod: 26

## 2021-03-08 PROCEDURE — 70496 CT ANGIOGRAPHY HEAD: CPT | Mod: 26

## 2021-03-08 RX ORDER — ASPIRIN/CALCIUM CARB/MAGNESIUM 324 MG
325 TABLET ORAL ONCE
Refills: 0 | Status: COMPLETED | OUTPATIENT
Start: 2021-03-08 | End: 2021-03-08

## 2021-03-08 RX ORDER — MECLIZINE HCL 12.5 MG
25 TABLET ORAL EVERY 6 HOURS
Refills: 0 | Status: DISCONTINUED | OUTPATIENT
Start: 2021-03-08 | End: 2021-03-08

## 2021-03-08 RX ORDER — ATORVASTATIN CALCIUM 80 MG/1
10 TABLET, FILM COATED ORAL AT BEDTIME
Refills: 0 | Status: DISCONTINUED | OUTPATIENT
Start: 2021-03-08 | End: 2021-03-08

## 2021-03-08 RX ORDER — MECLIZINE HCL 12.5 MG
25 TABLET ORAL ONCE
Refills: 0 | Status: COMPLETED | OUTPATIENT
Start: 2021-03-08 | End: 2021-03-08

## 2021-03-08 RX ORDER — LISINOPRIL 2.5 MG/1
10 TABLET ORAL DAILY
Refills: 0 | Status: DISCONTINUED | OUTPATIENT
Start: 2021-03-08 | End: 2021-03-08

## 2021-03-08 RX ADMIN — ATORVASTATIN CALCIUM 10 MILLIGRAM(S): 80 TABLET, FILM COATED ORAL at 22:19

## 2021-03-08 RX ADMIN — Medication 325 MILLIGRAM(S): at 19:36

## 2021-03-08 RX ADMIN — Medication 25 MILLIGRAM(S): at 12:58

## 2021-03-08 RX ADMIN — LISINOPRIL 10 MILLIGRAM(S): 2.5 TABLET ORAL at 22:19

## 2021-03-08 NOTE — ED PROVIDER NOTE - ATTENDING CONTRIBUTION TO CARE
74 y/o male h/o HTn, denies PSH c/o acute onset dizziness / room spinning after suddenly turning head and looking over shoulder 4 d/a, sx are intermittent, last minutes, worse with certain movement and position, denies fever, hearing loss, tinnitus, ear pain, auditory disturbances, visual disturbances, neck pain, headache, palpitations, irregular heart-beat, chest pain / pressure, dyspnea, near or total loss of consciousness, abnormal speech, paresthesias, clumsiness, difficulty with coordination, focal weakness or neurological deficits, postural changes, gait disturbances, association with coughing or sneezing, new medication changes, change in caffeine, nicotine or alcohol intake, recent trauma or other associated complaints at present. Reports similar sx previously which resolved spontaneously.    Old chart reviewed.  I have reviewed and agree with the nursing note, except as documented in my note.    VSS, awake, alert, non-toxic appearing, lying comfortably in stretcher, in NAD, PERRL / EOMI, no nystagmus, oropharynx clear, mmm, no JVD or carotid bruit, no skin rash or lesions, chest CTAB, non-labored breathing, no w/r/r, +S1/S2, RRR, no m/r/g, abdomen soft, NT, ND, +BS, no peripheral edema or deformities, equal pulses upper and lower extremities, alert and oriented to person, place and time, clear speech, cranial nerves II-XII are intact, upper and lower extremity strength is 5/5, symmetrical, coordination and gait are normal.

## 2021-03-08 NOTE — ED ADULT NURSE NOTE - OBJECTIVE STATEMENT
pt 76 y/o male presents to ED c/o dizziness x3-4days. pt states he drove himself here. pt denies cp denies sob

## 2021-03-08 NOTE — CONSULT NOTE ADULT - SUBJECTIVE AND OBJECTIVE BOX
Neurology Consult    Patient is a 75y old  Male who presents with a chief complaint of dizziness    HPI: 74 yo male with pmhx of htn presents with dizziness. pt states that when he is standing up and turns his head he feels like he is going to fall. this has been going on for 4-5 days and is persistent. patient denies previous similar episodes. Denies dizziness when turning his head while sitting or laying down,  double vision, headaches, LOC or falls.       PAST MEDICAL & SURGICAL HISTORY:  HTN (hypertension)    No significant past surgical history        FAMILY HISTORY:  No family history of cerebrovascular accident (CVA) (Father, Mother, Sibling)    No family history of cardiac disease (Father, Mother, Sibling)        Social History: (-) x 2 drinks beer or gin sometimes     Allergies    No Known Allergies    Intolerances        MEDICATIONS  (STANDING):    MEDICATIONS  (PRN):      Review of systems:    Constitutional: as per HPI  Neurological: As per HPI    Vital Signs Last 24 Hrs  T(C): 36.5 (08 Mar 2021 12:19), Max: 36.5 (08 Mar 2021 12:19)  T(F): 97.7 (08 Mar 2021 12:19), Max: 97.7 (08 Mar 2021 12:19)  HR: 77 (08 Mar 2021 12:19) (77 - 77)  BP: 175/102 (08 Mar 2021 12:19) (175/102 - 175/102)    RR: 18 (08 Mar 2021 12:19) (18 - 18)  SpO2: 97% (08 Mar 2021 12:19) (97% - 97%)    Examination:  General:  Appearance is consistent with chronologic age.  No abnormal facies.  Gross skin survey within normal limits.    Cognitive/Language:  The patient is oriented to person, place, time and but thinks its 2001.  Recent and remote memory intact.  pt had difficulty following exam and understanding what was being asked. when told to stick out his tongue he repeated it but the action he performed was turning his head.   Eyes: intact VA, VFF.  EOMI w/o nystagmus, skew or reported double vision.  PERRL.  No ptosis/weakness of eyelid closure.    Face:  Facial sensation normal V1 - 3, no facial asymmetry.    Ears/Nose/Throat:  Hearing grossly intact b/l.  Palate elevates midline.  Tongue and uvula midline.   Motor examination:   Normal tone, bulk and range of motion.  No tenderness, twitching, tremors or involuntary movements.  Formal Muscle Strength Testing: (MRC grade R/L) 5/5 UE; 5/5 LE.  No observable drift.  Sensory examination:   Intact to light touch in all extremities.  Cerebellum:   FTN intact with normal ALISIA in all limbs.  No dysmetria or dysdiadokinesia.  Gait deferred     Respiratory: no acute cardiopulmonary distress     Labs:   CBC Full  -  ( 08 Mar 2021 13:01 )  WBC Count : 6.72 K/uL  RBC Count : 5.35 M/uL  Hemoglobin : 14.1 g/dL  Hematocrit : 43.0 %  Platelet Count - Automated : 217 K/uL  Mean Cell Volume : 80.4 fL  Mean Cell Hemoglobin : 26.4 pg  Mean Cell Hemoglobin Concentration : 32.8 g/dL  Auto Neutrophil # : 3.78 K/uL  Auto Lymphocyte # : 2.19 K/uL  Auto Monocyte # : 0.59 K/uL  Auto Eosinophil # : 0.11 K/uL  Auto Basophil # : 0.04 K/uL  Auto Neutrophil % : 56.3 %  Auto Lymphocyte % : 32.6 %  Auto Monocyte % : 8.8 %  Auto Eosinophil % : 1.6 %  Auto Basophil % : 0.6 %    03-08    138  |  103  |  14  ----------------------------<  128<H>  4.1   |  25  |  1.0    Ca    8.8      08 Mar 2021 13:01    TPro  6.8  /  Alb  4.2  /  TBili  0.3  /  DBili  x   /  AST  23  /  ALT  23  /  AlkPhos  111  03-08    LIVER FUNCTIONS - ( 08 Mar 2021 13:01 )  Alb: 4.2 g/dL / Pro: 6.8 g/dL / ALK PHOS: 111 U/L / ALT: 23 U/L / AST: 23 U/L / GGT: x                   Neuroimaging:  NCHCT: CT Head No Cont:   EXAM:  CT BRAIN            PROCEDURE DATE:  03/08/2021            INTERPRETATION:  Clinical History / Reason for exam: Dizziness.    CT SCAN OF THE BRAIN WITHOUT CONTRAST    TECHNIQUE:    Multiple transaxial noncontrast CT images of the brain were obtained from base to vertex. Sagittal and coronal reformatted images were submitted.    COMPARISON:    Noncontrast CT scan of the brain dated August 4, 2019.    FINDINGS:    The third, fourth, and lateral ventricles are normal in size and position.    Incidental note is made of a cavum vellum interpositum.    There is no shift of the midline structures or evidence of acute intracranial hemorrhage or depressed skull fracture.    Patchy and punctate foci of diminished attenuation in the periventricular white matter and left brainstem consistent with chronic ischemic change.    There is a small arachnoid cyst arising in the superior cerebellar cistern.    Sclerosis of the left mastoid tip consistent with chronic inflammatory changes.    Vascularcalcifications are noted.    IMPRESSION:    In comparison with the prior CT scan of the brain dated August 4, 2019:    No acute intracranial hemorrhage or acute large territorial infarct.    Stable examination.              PEDRO DEMPSEY MD; Attending Radiologist  This document has been electronically signed. Mar  8 2021  4:16PM (03-08-21 @ 16:08)      < from: CT Angio Neck w/ IV Cont (03.08.21 @ 16:28) >  IMPRESSION:    CTA HEAD:  Severe stenosis involving the cavernous segment of the left internal carotid artery.    2.1 mm focal outpouching is noted involving the right supraclinoid internal carotid artery which may represent an infundibulum versus aneurysm.    CTA NECK:  No evidence of carotid or vertebral artery stenosis.      < end of copied text >  03-08-21 @ 18:29       Neurology Consult    Patient is a 75y old  Male who presents with a chief complaint of dizziness    HPI: 76 yo male with pmhx of htn presents with dizziness. pt states that when he is standing up and turns his head he feels like he is going to fall. this has been going on for 4-5 days and is persistent. patient denies previous similar episodes. Denies dizziness when turning his head while sitting or laying down,  double vision, headaches, LOC or falls.       PAST MEDICAL & SURGICAL HISTORY:  HTN (hypertension)    No significant past surgical history        FAMILY HISTORY:  No family history of cerebrovascular accident (CVA) (Father, Mother, Sibling)    No family history of cardiac disease (Father, Mother, Sibling)        Social History: (-) x 2 drinks beer or gin sometimes     Allergies    No Known Allergies    Intolerances        MEDICATIONS  (STANDING):    MEDICATIONS  (PRN):      Review of systems:    Constitutional: as per HPI  Neurological: As per HPI    Vital Signs Last 24 Hrs  T(C): 36.5 (08 Mar 2021 12:19), Max: 36.5 (08 Mar 2021 12:19)  T(F): 97.7 (08 Mar 2021 12:19), Max: 97.7 (08 Mar 2021 12:19)  HR: 77 (08 Mar 2021 12:19) (77 - 77)  BP: 175/102 (08 Mar 2021 12:19) (175/102 - 175/102)    RR: 18 (08 Mar 2021 12:19) (18 - 18)  SpO2: 97% (08 Mar 2021 12:19) (97% - 97%)    Examination:  General:  Appearance is consistent with chronologic age.  No abnormal facies.  Gross skin survey within normal limits.    Cognitive/Language:  The patient is oriented to person, place, time and but thinks its 2001.  Recent and remote memory intact.  pt had difficulty following exam and understanding what was being asked. when told to stick out his tongue he repeated it but the action he performed was turning his head.   Eyes: intact VA, VFF.  EOMI w/o nystagmus, skew or reported double vision.  PERRL.  No ptosis/weakness of eyelid closure.    Face:  Facial sensation normal V1 - 3, no facial asymmetry.    Ears/Nose/Throat:  Hearing grossly intact b/l.  Palate elevates midline.  Tongue and uvula midline.   Motor examination:   Normal tone, bulk and range of motion.  No tenderness, twitching, tremors or involuntary movements.  Formal Muscle Strength Testing: (MRC grade R/L) 5/5 UE; 5/5 LE.  No observable drift.  Sensory examination:   Intact to light touch in all extremities.  Cerebellum:   FTN intact with normal ALISIA in all limbs.  No dysmetria or dysdiadokinesia.  Gait deferred     Respiratory: no acute cardiopulmonary distress     Labs:   CBC Full  -  ( 08 Mar 2021 13:01 )  WBC Count : 6.72 K/uL  RBC Count : 5.35 M/uL  Hemoglobin : 14.1 g/dL  Hematocrit : 43.0 %  Platelet Count - Automated : 217 K/uL  Mean Cell Volume : 80.4 fL  Mean Cell Hemoglobin : 26.4 pg  Mean Cell Hemoglobin Concentration : 32.8 g/dL  Auto Neutrophil # : 3.78 K/uL  Auto Lymphocyte # : 2.19 K/uL  Auto Monocyte # : 0.59 K/uL  Auto Eosinophil # : 0.11 K/uL  Auto Basophil # : 0.04 K/uL  Auto Neutrophil % : 56.3 %  Auto Lymphocyte % : 32.6 %  Auto Monocyte % : 8.8 %  Auto Eosinophil % : 1.6 %  Auto Basophil % : 0.6 %    03-08    138  |  103  |  14  ----------------------------<  128<H>  4.1   |  25  |  1.0    Ca    8.8      08 Mar 2021 13:01    TPro  6.8  /  Alb  4.2  /  TBili  0.3  /  DBili  x   /  AST  23  /  ALT  23  /  AlkPhos  111  03-08    LIVER FUNCTIONS - ( 08 Mar 2021 13:01 )  Alb: 4.2 g/dL / Pro: 6.8 g/dL / ALK PHOS: 111 U/L / ALT: 23 U/L / AST: 23 U/L / GGT: x                   Neuroimaging:  NCHCT: CT Head No Cont:   EXAM:  CT BRAIN            PROCEDURE DATE:  03/08/2021            INTERPRETATION:  Clinical History / Reason for exam: Dizziness.    CT SCAN OF THE BRAIN WITHOUT CONTRAST    TECHNIQUE:    Multiple transaxial noncontrast CT images of the brain were obtained from base to vertex. Sagittal and coronal reformatted images were submitted.    COMPARISON:    Noncontrast CT scan of the brain dated August 4, 2019.    FINDINGS:    The third, fourth, and lateral ventricles are normal in size and position.    Incidental note is made of a cavum vellum interpositum.    There is no shift of the midline structures or evidence of acute intracranial hemorrhage or depressed skull fracture.    Patchy and punctate foci of diminished attenuation in the periventricular white matter and left brainstem consistent with chronic ischemic change.    There is a small arachnoid cyst arising in the superior cerebellar cistern.    Sclerosis of the left mastoid tip consistent with chronic inflammatory changes.    Vascularcalcifications are noted.    IMPRESSION:    In comparison with the prior CT scan of the brain dated August 4, 2019:    No acute intracranial hemorrhage or acute large territorial infarct.    Stable examination.              PEDRO DEMPSEY MD; Attending Radiologist  This document has been electronically signed. Mar  8 2021  4:16PM (03-08-21 @ 16:08)      < from: CT Angio Neck w/ IV Cont (03.08.21 @ 16:28) >  IMPRESSION:    CTA HEAD:  Severe stenosis involving the cavernous segment of the left internal carotid artery.    2.1 mm focal outpouching is noted involving the right supraclinoid internal carotid artery which may represent an infundibulum versus aneurysm.    CTA NECK:  No evidence of carotid or vertebral artery stenosis.      < end of copied text >  03-08-21 @ 18:29

## 2021-03-08 NOTE — CONSULT NOTE ADULT - ASSESSMENT
impression: 74 yo male with pmhx of htn presents with dizziness. pt states that when he is standing up and turns his head he feels like he is going to fall. this has been going on for 4-5 days and is persistent. PE significant for inability to follow commands. CT showed Severe stenosis involving the cavernous segment of the left internal carotid artery. 2.1 mm focal outpouching is noted involving the right supraclinoid internal carotid artery which may represent an infundibulum versus aneurysm.       suggestions:   admit to ED obs  start Asa 81 mg   keep SBP between 100-200   obtain MRI   will discuss with neuroendovascular   medical management per primary team   impression: 74 yo male with pmhx of htn presents with dizziness. pt states that when he is standing up and turns his head he feels like he is going to fall. this has been going on for 4-5 days and is persistent. PE significant for inability to follow commands. CT showed Severe stenosis involving the cavernous segment of the left internal carotid artery. 2.1 mm focal outpouching is noted involving the right supraclinoid internal carotid artery which may represent an infundibulum versus aneurysm.       suggestions:   admit to ED obs  start Asa 81 mg   keep SBP between 100-200   obtain MRI   will discuss with neuroendovascular   medical management per primary team   plan discussed with dr simmons   impression: 74 yo male with pmhx of htn presents with dizziness. pt states that when he is standing up and turns his head he feels like he is going to fall. this has been going on for 4-5 days and is persistent. PE significant for inability to follow commands. CT showed Severe stenosis involving the cavernous segment of the left internal carotid artery. 2.1 mm focal outpouching is noted involving the right supraclinoid internal carotid artery which may represent an infundibulum versus aneurysm.       suggestions:   admit to ED obs  start Asa 81 mg   keep SBP between 100-200   obtain MRI   will discuss with neuroendovascular   medical management per primary team   plan discussed with dr wolf    impression: 76 yo male with pmhx of htn presents with dizziness. pt states that when he is standing up and turns his head he feels like he is going to fall. this has been going on for 4-5 days and is persistent. PE significant for inability to follow commands. CT showed Severe stenosis involving the cavernous segment of the left internal carotid artery. 2.1 mm focal outpouching is noted involving the right supraclinoid internal carotid artery which may represent an infundibulum versus aneurysm.       suggestions:   admit to ED obs  start Asa 81 mg and atorvastatin 80mg QD   keep SBP between 100-200   obtain MRI   will discuss with neuroendovascular if MRI brain shows symptomatic ischemia in territory of the carotid  medical management per primary team   plan discussed with dr wolf

## 2021-03-08 NOTE — ED PROVIDER NOTE - OBJECTIVE STATEMENT
Pt is a 76 yo M w/ pmhx of HTN, HLD , HLD presenting w/ dizziness x 3-4 days. Pt reports that whenever he gets up to walk or move his head he gets very dizziness and feels like he is about to " fall" .  denies any HA , focal weakness. No fevers, chills. No tinnitus .

## 2021-03-08 NOTE — CONSULT NOTE ADULT - ATTENDING COMMENTS
Patient seen and examined and agree with above except as noted.  Patients case discussed with me last night and plan given to PA.  Reviewed history, notes, labs, imaging, vital and meds personally.  Patient complaining of losing balance when he turns his head either left or right.  When he moves his head straight symptoms resolve.  Does not describe vertigo only imbalance. Exam notable for some weakness in Right proximal UE without pain 4+/5  Sensory symmetric and FTN NL with no dysiadokinesia  NIHSS 0  mrankin 0    Plan as above

## 2021-03-08 NOTE — ED CDU PROVIDER INITIAL DAY NOTE - OBJECTIVE STATEMENT
76 yo male hx of HTN placed in obs for TIA work up. patient resented c/o dizziness for 4-5 days. dizziness exacerbated by standing and turning his head. [place] walking with unsteady gait so he came to ED evaluation. reports he is feeling better now in EDOU. Able to walk without unsteady gait and no dizziness on head turning.  Denies HA/slur speech/extremities weakness and numbness/ facial numbness and weakness. Denies Fever/chill/recent illness/coughing/chest pain/sob/abd pain/n/v/d/extremities pain/urinary sxs. Denies SI/HI/Hallucinations

## 2021-03-08 NOTE — ED CDU PROVIDER INITIAL DAY NOTE - ATTENDING CONTRIBUTION TO CARE
76 yo male PMH HTN c/o intermittent dizziness especially with turning head to the side.  Denies any other acute complaints.   Well-appearing well-nourished, NAD, head AT/NC, PERRL, pink conjunctivae, nml phonation, supple neck without midline spine ttp, nml work of breathing, lungs CTA b/l, equal air entry, RRR, well-perfused extremities, distal pulses intact, abdomen soft, NT/ND, BS present in all quadrants, no midline spine or CVA ttp, no leg edema or unilateral calf swelling, A&Ox3, no focal neuro deficits, nml mood and affect.  Will continue with neurology recommendations for MRI .

## 2021-03-08 NOTE — ED PROVIDER NOTE - PHYSICAL EXAMINATION
CONSTITUTIONAL: Well-developed; well-nourished; in no acute distress.   SKIN: warm, dry  HEAD: Normocephalic; atraumatic.  EYES: PERRL, EOMI, normal sclera and conjunctiva   ENT: No nasal discharge; airway clear.  NECK: Supple; non tender.  CARD:  Regular rate and rhythm.   RESP: NO inc WOB   ABD: soft ntnd  EXT: Normal ROM.    LYMPH: No acute cervical adenopathy.  NEURO: AAO x 3, normal speech, no facial asymmetry, negative pronator drift, no ataxia, negative Romberg, no dysdiadokinesia, no nystagmus, peripheral vision intact, sensory equal and intact.  PSYCH: Cooperative, appropriate.

## 2021-03-08 NOTE — ED CDU PROVIDER INITIAL DAY NOTE - NS ED ROS FT

## 2021-03-08 NOTE — ED CDU PROVIDER INITIAL DAY NOTE - PHYSICAL EXAMINATION
CONSTITUTIONAL: Well-appearing; well-nourished; in no apparent distress.   EYES: PERRL; EOM intact.   ENT: normal nose; no rhinorrhea; normal pharynx with no tonsillar hypertrophy.   NECK: Supple; non-tender; no cervical lymphadenopathy. No JVD.   CARDIOVASCULAR: Normal S1, S2; no murmurs, rubs, or gallops.   RESPIRATORY: Normal chest excursion with respiration; breath sounds clear and equal bilaterally; no wheezes, rhonchi, or rales.  GI/: Normal bowel sounds; non-distended; non-tender; no palpable organomegaly.   MS: No evidence of trauma or deformity. Non-tender to palpation. No scoliosis. No CVA tenderness. Normal ROM in all four extremities; non-tender to palpation; distal pulses are normal.   SKIN: Normal for age and race; warm; dry; good turgor; no apparent lesions or exudate.   NEURO/PSYCH: A & O x 4; grossly unremarkable. no drifting. strength equal to b/l upper and lower extremities. speaking coherently. nml cerebellum test. ambulate with nml and steady gait

## 2021-03-09 VITALS
SYSTOLIC BLOOD PRESSURE: 151 MMHG | TEMPERATURE: 98 F | DIASTOLIC BLOOD PRESSURE: 67 MMHG | RESPIRATION RATE: 20 BRPM | HEART RATE: 65 BPM | OXYGEN SATURATION: 68 %

## 2021-03-09 LAB
CHOLEST SERPL-MCNC: 197 MG/DL — SIGNIFICANT CHANGE UP
HDLC SERPL-MCNC: 60 MG/DL — SIGNIFICANT CHANGE UP
LIPID PNL WITH DIRECT LDL SERPL: 140 MG/DL — HIGH
NON HDL CHOLESTEROL: 137 MG/DL — HIGH
TRIGL SERPL-MCNC: 104 MG/DL — SIGNIFICANT CHANGE UP

## 2021-03-09 PROCEDURE — 70551 MRI BRAIN STEM W/O DYE: CPT | Mod: 26

## 2021-03-09 PROCEDURE — 99217: CPT

## 2021-03-09 RX ORDER — MECLIZINE HCL 12.5 MG
1 TABLET ORAL
Qty: 21 | Refills: 0
Start: 2021-03-09 | End: 2021-03-15

## 2021-03-09 RX ADMIN — LISINOPRIL 10 MILLIGRAM(S): 2.5 TABLET ORAL at 05:54

## 2021-03-09 NOTE — ED CDU PROVIDER DISPOSITION NOTE - PATIENT PORTAL LINK FT
You can access the FollowMyHealth Patient Portal offered by [facility] by registering at the following website: [email or web address]. By joining [employer]’s FollowMyHealth portal, you will also be able to view your health information using other applications (apps) compatible with our system.

## 2021-03-09 NOTE — ED CDU PROVIDER SUBSEQUENT DAY NOTE - HISTORY
76 yo male hx of HTN placed in obs for TIA work up. patient resented c/o dizziness for 4-5 days. dizziness exacerbated by standing and turning his head. [place] walking with unsteady gait so he came to ED evaluation. reports he is feeling better now in EDOU. Able to walk without unsteady gait and no dizziness on head turning.  Denies HA/slur speech/extremities weakness and numbness/ facial numbness and weakness. Denies Fever/chill/recent illness/coughing/chest pain/sob/abd pain/n/v/d/extremities pain/urinary sxs. Denies SI/HI/Hallucinations. Pt awaiting MRI. Reports improvement of dizziness

## 2021-03-09 NOTE — ED CDU PROVIDER SUBSEQUENT DAY NOTE - PROGRESS NOTE DETAILS
Patient appears very well, no focal neuro deficits.  He was seen this AM by Dr Tony Hickman. MRI done this AM, reading is pending. MRI is negative for acute pathology and the patient was cleared by neurology for d/c home.  Follow up outpatient.  Patient to be discharged from ED. Any available test results were discussed with patient and/or family. Verbal instructions given, including instructions to return to ED immediately for any new, worsening, or concerning symptoms. Patient endorsed understanding. Written discharge instructions additionally given, including follow-up plan.  Patient was given opportunity to ask questions.

## 2021-03-09 NOTE — ED CDU PROVIDER DISPOSITION NOTE - CARE PROVIDER_API CALL
Zhang Reed)  EEGEpilepsy; Neurology  [address] [place] [address], [address]  [place] [address]  Phone: [phone]  Fax: [phone]  Follow Up Time: 7-10 Days

## 2021-03-09 NOTE — ED ADULT NURSE REASSESSMENT NOTE - NS ED NURSE REASSESS COMMENT FT1
Received pt observed sleeping, no acute distress noted. Will monitor.
Patient assessed, A&O x4, denies chest pain or SOB, denies any weakness or dizziness, cardiac monitoring maintained, patient awaiting MRI of head. Will continue to monitor patient.

## 2021-03-09 NOTE — ED CDU PROVIDER DISPOSITION NOTE - NSFOLLOWUPINSTRUCTIONS_ED_ALL_ED_FT

## 2021-03-09 NOTE — ED CDU PROVIDER SUBSEQUENT DAY NOTE - MEDICAL DECISION MAKING DETAILS
Patient with intermittent dizziness for a few days.  Seen by neurology, multiple neuroimaging done, no acute pathology found.  CTA brain showed severe stenosis of the cavernous portion of the rt ICA, but no acute intervention planned.  Patient is  stable for d/c home, he is neurologically intact, advised to follow up outpatient,  Strict return precautions given.

## 2021-03-09 NOTE — ED CDU PROVIDER DISPOSITION NOTE - CLINICAL COURSE
Patient with intermittent dizziness for a few days.  Seen by neurology, multiple neuroimaging done, no acute pathology found.  CTA brain showed severe stenosis of the cavernous portion of the rt ICA, but no acute intervention planned. Labs are WNL.  Patient is  stable for d/c home, he is neurologically intact, advised to follow up outpatient,  Strict return precautions given.

## 2021-03-09 NOTE — ED CDU PROVIDER DISPOSITION NOTE - ATTENDING CONTRIBUTION TO CARE
74 yo male PMH HTN c/o intermittent dizziness especially with turning head to the side.  Denies any other acute complaints.   Well-appearing well-nourished, NAD, head AT/NC, PERRL, pink conjunctivae, nml phonation, supple neck without midline spine ttp, nml work of breathing, lungs CTA b/l, equal air entry, RRR, well-perfused extremities, distal pulses intact, abdomen soft, NT/ND, BS present in all quadrants, no midline spine or CVA ttp, no leg edema or unilateral calf swelling, A&Ox3, no focal neuro deficits, nml mood and affect.  neuro work up negative for acute pathology, stable for d/c home.

## 2021-03-25 ENCOUNTER — EMERGENCY (EMERGENCY)
Facility: HOSPITAL | Age: 76
LOS: 0 days | Discharge: HOME | End: 2021-03-25
Attending: EMERGENCY MEDICINE | Admitting: EMERGENCY MEDICINE
Payer: MEDICAID

## 2021-03-25 VITALS
RESPIRATION RATE: 17 BRPM | OXYGEN SATURATION: 96 % | TEMPERATURE: 98 F | HEART RATE: 87 BPM | HEIGHT: 71 IN | SYSTOLIC BLOOD PRESSURE: 165 MMHG | WEIGHT: 210.98 LBS | DIASTOLIC BLOOD PRESSURE: 85 MMHG

## 2021-03-25 VITALS
OXYGEN SATURATION: 98 % | SYSTOLIC BLOOD PRESSURE: 154 MMHG | RESPIRATION RATE: 18 BRPM | HEART RATE: 84 BPM | DIASTOLIC BLOOD PRESSURE: 81 MMHG | TEMPERATURE: 98 F

## 2021-03-25 DIAGNOSIS — Z79.899 OTHER LONG TERM (CURRENT) DRUG THERAPY: ICD-10-CM

## 2021-03-25 DIAGNOSIS — R42 DIZZINESS AND GIDDINESS: ICD-10-CM

## 2021-03-25 DIAGNOSIS — Z20.822 CONTACT WITH AND (SUSPECTED) EXPOSURE TO COVID-19: ICD-10-CM

## 2021-03-25 DIAGNOSIS — I10 ESSENTIAL (PRIMARY) HYPERTENSION: ICD-10-CM

## 2021-03-25 DIAGNOSIS — Z79.82 LONG TERM (CURRENT) USE OF ASPIRIN: ICD-10-CM

## 2021-03-25 DIAGNOSIS — E78.5 HYPERLIPIDEMIA, UNSPECIFIED: ICD-10-CM

## 2021-03-25 LAB
RAPID RVP RESULT: SIGNIFICANT CHANGE UP
SARS-COV-2 RNA SPEC QL NAA+PROBE: SIGNIFICANT CHANGE UP

## 2021-03-25 PROCEDURE — 99284 EMERGENCY DEPT VISIT MOD MDM: CPT

## 2021-03-25 RX ORDER — MECLIZINE HCL 12.5 MG
1 TABLET ORAL
Qty: 56 | Refills: 0
Start: 2021-03-25 | End: 2021-04-07

## 2021-03-25 RX ORDER — MECLIZINE HCL 12.5 MG
50 TABLET ORAL ONCE
Refills: 0 | Status: COMPLETED | OUTPATIENT
Start: 2021-03-25 | End: 2021-03-25

## 2021-03-25 RX ADMIN — Medication 50 MILLIGRAM(S): at 15:59

## 2021-03-25 NOTE — ED PROVIDER NOTE - NSFOLLOWUPINSTRUCTIONS_ED_ALL_ED_FT
Vertigo    Vertigo is the feeling that you or your surroundings are moving when they are not. Vertigo can be dangerous if it occurs while you are doing something that could endanger you or others, such as driving.    CAUSES  This condition is caused by a disturbance in the signals that are sent by your body's sensory systems to your brain. Different causes of a disturbance can lead to vertigo, including:    Infections, especially in the inner ear.  A bad reaction to a drug, or misuse of alcohol and medicines.  Withdrawal from drugs or alcohol.  Quickly changing positions, as when lying down or rolling over in bed.  Migraine headaches.  Decreased blood flow to the brain.  Decreased blood pressure.  Increased pressure in the brain from a head or neck injury, stroke, infection, tumor, or bleeding.  Central nervous system disorders.    SYMPTOMS  Symptoms of this condition usually occur when you move your head or your eyes in different directions. Symptoms may start suddenly, and they usually last for less than a minute. Symptoms may include:    Loss of balance and falling.  Feeling like you are spinning or moving.  Feeling like your surroundings are spinning or moving.  Nausea and vomiting.  Blurred vision or double vision.  Difficulty hearing.  Slurred speech.  Dizziness.  Involuntary eye movement (nystagmus).    Symptoms can be mild and cause only slight annoyance, or they can be severe and interfere with daily life. Episodes of vertigo may return (recur) over time, and they are often triggered by certain movements. Symptoms may improve over time.    DIAGNOSIS  This condition may be diagnosed based on medical history and the quality of your nystagmus. Your health care provider may test your eye movements by asking you to quickly change positions to trigger the nystagmus. This may be called the Roswell-Hallpike test, head thrust test, or roll test. You may be referred to a health care provider who specializes in ear, nose, and throat (ENT) problems (otolaryngologist) or a provider who specializes in disorders of the central nervous system (neurologist).    You may have additional testing, including:    A physical exam.  Blood tests.  MRI.  A CT scan.  An electrocardiogram (ECG). This records electrical activity in your heart.  An electroencephalogram (EEG). This records electrical activity in your brain.  Hearing tests.    TREATMENT  Treatment for this condition depends on the cause and the severity of the symptoms. Treatment options include:    Medicines to treat nausea or vertigo. These are usually used for severe cases. Some medicines that are used to treat other conditions may also reduce or eliminate vertigo symptoms. These include:  Medicines that control allergies (antihistamines).  Medicines that control seizures (anticonvulsants).  Medicines that relieve depression (antidepressants).  Medicines that relieve anxiety (sedatives).  Head movements to adjust your inner ear back to normal. If your vertigo is caused by an ear problem, your health care provider may recommend certain movements to correct the problem.  Surgery. This is rare.    HOME CARE INSTRUCTIONS  Safety     Move slowly. Avoid sudden body or head movements.  Avoid driving.  Avoid operating heavy machinery.  Avoid doing any tasks that would cause danger to you or others if you would have a vertigo episode during the task.  If you have trouble walking or keeping your balance, try using a cane for stability. If you feel dizzy or unstable, sit down right away.  Return to your normal activities as told by your health care provider. Ask your health care provider what activities are safe for you.    General Instructions     Take over-the-counter and prescription medicines only as told by your health care provider.  Avoid certain positions or movements as told by your health care provider.  Drink enough fluid to keep your urine clear or pale yellow.  Keep all follow-up visits as told by your health care provider. This is important.    SEEK MEDICAL CARE IF:  Your medicines do not relieve your vertigo or they make it worse.  You have a fever.  Your condition gets worse or you develop new symptoms.  Your family or friends notice any behavioral changes.  Your nausea or vomiting gets worse.  You have numbness or a "pins and needles" sensation in part of your body.    SEEK IMMEDIATE MEDICAL CARE IF:  You have difficulty moving or speaking.  You are always dizzy.  You faint.  You develop severe headaches.  You have weakness in your hands, arms, or legs.  You have changes in your hearing or vision.  You develop a stiff neck.  You develop sensitivity to light.    ADDITIONAL NOTES AND INSTRUCTIONS    Please follow up with your Primary MD in 24-48 hr.  Seek immediate medical care for any new/worsening signs or symptoms.

## 2021-03-25 NOTE — ED PROVIDER NOTE - PHYSICAL EXAMINATION
CONSTITUTIONAL: Well-developed; well-nourished; in no acute distress.   SKIN: warm, dry  HEAD: Normocephalic; atraumatic.  EYES: no conjunctival injection. PERRLA. EOMI. +mild fast beating horizontal nystagmus.   ENT: No nasal discharge; airway clear.  NECK: Supple; non tender.  CARD: S1, S2 normal; Regular rate and rhythm.   RESP: No wheezes, rales or rhonchi.  ABD: soft ntnd.  EXT: Normal ROM.  No clubbing, cyanosis or edema.   LYMPH: No acute cervical adenopathy.  NEURO: AOx3, CN 2-12 grossly intact. +5/5 strength and sensation wnl in all extremities. No pronator drift, no dysarthria, no ataxia, normal gait, no DM/DDK, negative romberg.   PSYCH: Cooperative, appropriate.

## 2021-03-25 NOTE — ED PROVIDER NOTE - CARE PROVIDERS DIRECT ADDRESSES
[email or web address]

## 2021-03-25 NOTE — ED PROVIDER NOTE - CARE PROVIDER_API CALL
Zhang Reed)  EEGEpilepsy; Neurology  [address] [place] [address], [address]  [place] [address]  Phone: [phone]  Fax: [phone]  Follow Up Time: Routine    Yenni Capps ()  Geriatric Medicine; Internal Medicine  [address] [place] [address]  Phone: [phone]  Fax: [phone]  Follow Up Time: Routine

## 2021-03-25 NOTE — ED PROVIDER NOTE - ATTENDING CONTRIBUTION TO CARE
76 y/o male with hx of HTN, dyslipidemia, presents to ED with dizziness x 1 week.  Symptoms are a spinning sensation exacerbated by motion.  Had a full workup for this in the ED about 2 weeks ago and was d/c with Meclizine which he said has been helping but he ran out.  PE:  agree with above. A/P: Vertigo, med refill.  D/C.

## 2021-03-25 NOTE — ED PROVIDER NOTE - NS ED ROS FT
Review of Systems:  CONSTITUTIONAL: No fever, No diaphoresis, No weight change  SKIN: No rash  HEMATOLOGIC: No abnormal bleeding or bruising  EYES: No eye pain, No blurred vision  ENT: No change in hearing, No sore throat, No neck pain, No rhinorrhea, No ear pain  RESPIRATORY: No shortness of breath, No cough  CARDIAC: No chest pain, No palpitations  GI: No abdominal pain, No nausea, No vomiting, No diarrhea, No constipation, No bright red blood per rectum or melena. No flank pain  : No dysuria, frequency, hematuria.   MUSCULOSKELETAL: No joint paint, No swelling, No back pain  NEUROLOGIC: No numbness, No focal weakness, No headache, +dizziness  All other systems negative, unless specified in HPI

## 2021-03-25 NOTE — ED PROVIDER NOTE - CLINICAL SUMMARY MEDICAL DECISION MAKING FREE TEXT BOX
No new findings.  Full neuro consult and eval on previous admission.  Medication refill and d/c to f/u with Neuro as an outpatient.

## 2021-03-25 NOTE — ED ADULT NURSE NOTE - CAS EDN DISCHARGE ASSESSMENT
Inform patient INR is too low. Confirm current dose of warfarin 4 mg tablets 1 tablet on Mondays and a half tablet the other 6 days. . Change warfarin 4 mg tablets to 2 tablets today, then 1 tablet on Mondays and Fridays and a half tablet the other 5 days. Repeat INR in  2 weeks.  on being consistent about intake of high Vitamin K containing foods. Message also sent in My Chart. Alert and oriented to person, place and time

## 2021-03-25 NOTE — ED PROVIDER NOTE - OBJECTIVE STATEMENT
76 y/o M PMHx HTN, HLD presents to ED with dizziness x 1 week. Dizziness is every day, worse in AM, worse with movements. Reports he feels like the room is spinning. Pt was in ED 2 weeks ago for similar complaint, told he had vertigo and was told to take meclizine and aspirin. Pt had carotid stenosis on imaging at that time and was evaluated by neurology. 76 y/o M PMHx HTN, HLD presents to ED with dizziness x 1 week. Dizziness is every day, worse in AM, worse with movements. Reports he feels like the room is spinning. Pt was in ED 2 weeks ago for similar complaint, told he had vertigo and was told to take meclizine and aspirin. Pt had carotid stenosis on imaging at that time and was evaluated by neurology. Pt reports he was given meclizine, ran out and is requesting more. Denies vision changes. No fevers. No headache. No vomiting.

## 2021-03-25 NOTE — ED PROVIDER NOTE - NSTIMEPROVIDERCAREINITIATE_GEN_ER
Pt c/o lower abdominal pain that is generalized for the past 3 days. Pt has hx of diverticulitis. Pt was given Fentanyl by EMS and pt stated \" that never does anything for me\".   
25-Mar-2021 14:23

## 2021-03-25 NOTE — ED PROVIDER NOTE - PROVIDER TOKENS
PROVIDER:[TOKEN:[[id number]],FOLLOWUP:[Routine]],PROVIDER:[TOKEN:[[id number]],FOLLOWUP:[Routine]]

## 2021-03-25 NOTE — ED PROVIDER NOTE - PATIENT PORTAL LINK FT
You can access the FollowMyHealth Patient Portal offered by [facility] by registering at the following website: [email or web address]. By joining [employer]’s FollowMyHealth portal, you will also be able to view your health information using other applications (apps) compatible with our system.

## 2021-04-07 ENCOUNTER — APPOINTMENT (OUTPATIENT)
Dept: GERIATRICS | Facility: CLINIC | Age: 76
End: 2021-04-07
Payer: MEDICAID

## 2021-04-07 ENCOUNTER — OUTPATIENT (OUTPATIENT)
Dept: OUTPATIENT SERVICES | Facility: HOSPITAL | Age: 76
LOS: 1 days | Discharge: HOME | End: 2021-04-07

## 2021-04-07 VITALS
HEART RATE: 71 BPM | HEIGHT: 68 IN | SYSTOLIC BLOOD PRESSURE: 171 MMHG | DIASTOLIC BLOOD PRESSURE: 83 MMHG | OXYGEN SATURATION: 98 % | WEIGHT: 213 LBS | BODY MASS INDEX: 32.28 KG/M2 | TEMPERATURE: 98 F

## 2021-04-07 PROCEDURE — 99213 OFFICE O/P EST LOW 20 MIN: CPT

## 2021-04-07 NOTE — HISTORY OF PRESENT ILLNESS
[FreeTextEntry1] : The patient is a 75 year-old male with a PMH of HTN, glaucoma, HLD, vitamin D deficiency, presenting for routine follow up for bloodwork. He reports over the last several months, he has been having bad dreams that occasionally wake him up, but he is not anxious or depressed during the day. He reports occasionally missing his medications, but otherwise no issues.

## 2021-04-07 NOTE — ASSESSMENT
[FreeTextEntry1] : # HTN\par BP 170s/80s but patient did not take antihypertensive this AM.\par C/w lisinopril 10 mg once daily\par F/u in 6 months\par \par # Chronic constipation\par C/w Colace 2 tabs once daily\par \par # Glaucoma\par C/w timolol and latanaprost eye drops.\par \par # HCM\par Pneumococcal vaccine given today.\par Patient received Moderna COVID vaccine w/ no s/e.\par Repeat bloodwork (CBC, CMP, lipid profile, TSH, vitamin D, A1c).\par F/u in 6 months.

## 2021-04-07 NOTE — END OF VISIT
[] : Resident [FreeTextEntry3] : SEEN WITH [employer]; patient still working as a ; good energy level and no CP or chest pressure; BP is elevated and he did not take his lisinopril today; based upon the way he is talking today I doubt he is very compliant and I discussed this with him; He took Moderna COVID vaccine and tolerated this well; will get Pneumovax today.

## 2021-04-07 NOTE — PHYSICAL EXAM
[Neck Appearance] : the appearance of the neck was normal [] : no respiratory distress [Respiration, Rhythm And Depth] : normal respiratory rhythm and effort [Apical Impulse] : the apical impulse was normal [Heart Rate And Rhythm] : heart rate was normal and rhythm regular [Heart Sounds] : normal S1 and S2 [Murmurs] : no murmurs [Abdomen Soft] : soft [Abdomen Tenderness] : non-tender [Abdomen Mass (___ Cm)] : no abdominal mass palpated [Abnormal Walk] : normal gait

## 2021-04-08 DIAGNOSIS — I10 ESSENTIAL (PRIMARY) HYPERTENSION: ICD-10-CM

## 2021-04-08 DIAGNOSIS — E78.5 HYPERLIPIDEMIA, UNSPECIFIED: ICD-10-CM

## 2021-04-08 DIAGNOSIS — H40.9 UNSPECIFIED GLAUCOMA: ICD-10-CM

## 2021-04-08 DIAGNOSIS — K21.9 GASTRO-ESOPHAGEAL REFLUX DISEASE WITHOUT ESOPHAGITIS: ICD-10-CM

## 2021-04-08 LAB
25(OH)D3 SERPL-MCNC: 25 NG/ML
ALBUMIN SERPL ELPH-MCNC: 4.3 G/DL
ALP BLD-CCNC: 97 U/L
ALT SERPL-CCNC: 28 U/L
ANION GAP SERPL CALC-SCNC: 11 MMOL/L
AST SERPL-CCNC: 28 U/L
BASOPHILS # BLD AUTO: 0.04 K/UL
BASOPHILS NFR BLD AUTO: 0.7 %
BILIRUB SERPL-MCNC: 0.5 MG/DL
BUN SERPL-MCNC: 10 MG/DL
CALCIUM SERPL-MCNC: 9.5 MG/DL
CHLORIDE SERPL-SCNC: 103 MMOL/L
CHOLEST SERPL-MCNC: 179 MG/DL
CO2 SERPL-SCNC: 27 MMOL/L
CREAT SERPL-MCNC: 0.9 MG/DL
EOSINOPHIL # BLD AUTO: 0.14 K/UL
EOSINOPHIL NFR BLD AUTO: 2.3 %
ESTIMATED AVERAGE GLUCOSE: 140 MG/DL
GLUCOSE SERPL-MCNC: 82 MG/DL
HBA1C MFR BLD HPLC: 6.5 %
HCT VFR BLD CALC: 45.5 %
HDLC SERPL-MCNC: 52 MG/DL
HGB BLD-MCNC: 14.6 G/DL
IMM GRANULOCYTES NFR BLD AUTO: 0.2 %
LDLC SERPL CALC-MCNC: 116 MG/DL
LYMPHOCYTES # BLD AUTO: 2 K/UL
LYMPHOCYTES NFR BLD AUTO: 32.6 %
MAN DIFF?: NORMAL
MCHC RBC-ENTMCNC: 26.2 PG
MCHC RBC-ENTMCNC: 32.1 G/DL
MCV RBC AUTO: 81.7 FL
MONOCYTES # BLD AUTO: 0.7 K/UL
MONOCYTES NFR BLD AUTO: 11.4 %
NEUTROPHILS # BLD AUTO: 3.24 K/UL
NEUTROPHILS NFR BLD AUTO: 52.8 %
NONHDLC SERPL-MCNC: 127 MG/DL
PLATELET # BLD AUTO: 183 K/UL
POTASSIUM SERPL-SCNC: 4.2 MMOL/L
PROT SERPL-MCNC: 7 G/DL
RBC # BLD: 5.57 M/UL
RBC # FLD: 13.3 %
SODIUM SERPL-SCNC: 141 MMOL/L
TRIGL SERPL-MCNC: 67 MG/DL
TSH SERPL-ACNC: 2.42 UIU/ML
WBC # FLD AUTO: 6.13 K/UL

## 2021-05-10 ENCOUNTER — OUTPATIENT (OUTPATIENT)
Dept: OUTPATIENT SERVICES | Facility: HOSPITAL | Age: 76
LOS: 1 days | Discharge: HOME | End: 2021-05-10
Payer: MEDICAID

## 2021-05-10 ENCOUNTER — APPOINTMENT (OUTPATIENT)
Dept: OPHTHALMOLOGY | Facility: CLINIC | Age: 76
End: 2021-05-10

## 2021-05-10 PROCEDURE — 92014 COMPRE OPH EXAM EST PT 1/>: CPT

## 2021-05-10 PROCEDURE — 92133 CPTRZD OPH DX IMG PST SGM ON: CPT | Mod: 26

## 2021-05-13 DIAGNOSIS — H25.813 COMBINED FORMS OF AGE-RELATED CATARACT, BILATERAL: ICD-10-CM

## 2021-05-13 DIAGNOSIS — H25.13 AGE-RELATED NUCLEAR CATARACT, BILATERAL: ICD-10-CM

## 2021-05-13 DIAGNOSIS — H35.039 HYPERTENSIVE RETINOPATHY, UNSPECIFIED EYE: ICD-10-CM

## 2021-05-13 DIAGNOSIS — H40.1232 LOW-TENSION GLAUCOMA, BILATERAL, MODERATE STAGE: ICD-10-CM

## 2021-05-24 ENCOUNTER — INPATIENT (INPATIENT)
Facility: HOSPITAL | Age: 76
LOS: 1 days | Discharge: HOME | End: 2021-05-26
Attending: INTERNAL MEDICINE | Admitting: INTERNAL MEDICINE
Payer: MEDICAID

## 2021-05-24 VITALS
OXYGEN SATURATION: 98 % | DIASTOLIC BLOOD PRESSURE: 74 MMHG | SYSTOLIC BLOOD PRESSURE: 135 MMHG | TEMPERATURE: 98 F | HEIGHT: 71 IN | RESPIRATION RATE: 18 BRPM | HEART RATE: 105 BPM

## 2021-05-24 LAB
ALBUMIN SERPL ELPH-MCNC: 4.3 G/DL — SIGNIFICANT CHANGE UP (ref 3.5–5.2)
ALP SERPL-CCNC: 117 U/L — HIGH (ref 30–115)
ALT FLD-CCNC: 24 U/L — SIGNIFICANT CHANGE UP (ref 0–41)
ANION GAP SERPL CALC-SCNC: 12 MMOL/L — SIGNIFICANT CHANGE UP (ref 7–14)
APTT BLD: 32.2 SEC — SIGNIFICANT CHANGE UP (ref 27–39.2)
AST SERPL-CCNC: 24 U/L — SIGNIFICANT CHANGE UP (ref 0–41)
BASOPHILS # BLD AUTO: 0.03 K/UL — SIGNIFICANT CHANGE UP (ref 0–0.2)
BASOPHILS NFR BLD AUTO: 0.4 % — SIGNIFICANT CHANGE UP (ref 0–1)
BILIRUB SERPL-MCNC: 0.5 MG/DL — SIGNIFICANT CHANGE UP (ref 0.2–1.2)
BUN SERPL-MCNC: 10 MG/DL — SIGNIFICANT CHANGE UP (ref 10–20)
CALCIUM SERPL-MCNC: 9.3 MG/DL — SIGNIFICANT CHANGE UP (ref 8.5–10.1)
CHLORIDE SERPL-SCNC: 104 MMOL/L — SIGNIFICANT CHANGE UP (ref 98–110)
CO2 SERPL-SCNC: 24 MMOL/L — SIGNIFICANT CHANGE UP (ref 17–32)
CREAT SERPL-MCNC: 0.9 MG/DL — SIGNIFICANT CHANGE UP (ref 0.7–1.5)
D DIMER BLD IA.RAPID-MCNC: 4200 NG/ML DDU — HIGH (ref 0–230)
EOSINOPHIL # BLD AUTO: 0.15 K/UL — SIGNIFICANT CHANGE UP (ref 0–0.7)
EOSINOPHIL NFR BLD AUTO: 1.9 % — SIGNIFICANT CHANGE UP (ref 0–8)
GLUCOSE SERPL-MCNC: 124 MG/DL — HIGH (ref 70–99)
HCT VFR BLD CALC: 39.3 % — LOW (ref 42–52)
HGB BLD-MCNC: 12.9 G/DL — LOW (ref 14–18)
IMM GRANULOCYTES NFR BLD AUTO: 0.3 % — SIGNIFICANT CHANGE UP (ref 0.1–0.3)
INR BLD: 1.11 RATIO — SIGNIFICANT CHANGE UP (ref 0.65–1.3)
LYMPHOCYTES # BLD AUTO: 1.69 K/UL — SIGNIFICANT CHANGE UP (ref 1.2–3.4)
LYMPHOCYTES # BLD AUTO: 21.7 % — SIGNIFICANT CHANGE UP (ref 20.5–51.1)
MAGNESIUM SERPL-MCNC: 1.9 MG/DL — SIGNIFICANT CHANGE UP (ref 1.8–2.4)
MCHC RBC-ENTMCNC: 25.6 PG — LOW (ref 27–31)
MCHC RBC-ENTMCNC: 32.8 G/DL — SIGNIFICANT CHANGE UP (ref 32–37)
MCV RBC AUTO: 78 FL — LOW (ref 80–94)
MONOCYTES # BLD AUTO: 0.69 K/UL — HIGH (ref 0.1–0.6)
MONOCYTES NFR BLD AUTO: 8.9 % — SIGNIFICANT CHANGE UP (ref 1.7–9.3)
NEUTROPHILS # BLD AUTO: 5.21 K/UL — SIGNIFICANT CHANGE UP (ref 1.4–6.5)
NEUTROPHILS NFR BLD AUTO: 66.8 % — SIGNIFICANT CHANGE UP (ref 42.2–75.2)
NRBC # BLD: 0 /100 WBCS — SIGNIFICANT CHANGE UP (ref 0–0)
NT-PROBNP SERPL-SCNC: 22 PG/ML — SIGNIFICANT CHANGE UP (ref 0–300)
PLATELET # BLD AUTO: 224 K/UL — SIGNIFICANT CHANGE UP (ref 130–400)
POTASSIUM SERPL-MCNC: 4.2 MMOL/L — SIGNIFICANT CHANGE UP (ref 3.5–5)
POTASSIUM SERPL-SCNC: 4.2 MMOL/L — SIGNIFICANT CHANGE UP (ref 3.5–5)
PROT SERPL-MCNC: 7.1 G/DL — SIGNIFICANT CHANGE UP (ref 6–8)
PROTHROM AB SERPL-ACNC: 12.8 SEC — SIGNIFICANT CHANGE UP (ref 9.95–12.87)
RBC # BLD: 5.04 M/UL — SIGNIFICANT CHANGE UP (ref 4.7–6.1)
RBC # FLD: 13.2 % — SIGNIFICANT CHANGE UP (ref 11.5–14.5)
SARS-COV-2 RNA SPEC QL NAA+PROBE: SIGNIFICANT CHANGE UP
SODIUM SERPL-SCNC: 140 MMOL/L — SIGNIFICANT CHANGE UP (ref 135–146)
TROPONIN T SERPL-MCNC: <0.01 NG/ML — SIGNIFICANT CHANGE UP
WBC # BLD: 7.79 K/UL — SIGNIFICANT CHANGE UP (ref 4.8–10.8)
WBC # FLD AUTO: 7.79 K/UL — SIGNIFICANT CHANGE UP (ref 4.8–10.8)

## 2021-05-24 PROCEDURE — 93970 EXTREMITY STUDY: CPT | Mod: 26

## 2021-05-24 PROCEDURE — 99285 EMERGENCY DEPT VISIT HI MDM: CPT | Mod: 25

## 2021-05-24 PROCEDURE — 71046 X-RAY EXAM CHEST 2 VIEWS: CPT | Mod: 26

## 2021-05-24 PROCEDURE — 93308 TTE F-UP OR LMTD: CPT | Mod: 26

## 2021-05-24 PROCEDURE — G1004: CPT

## 2021-05-24 PROCEDURE — 71275 CT ANGIOGRAPHY CHEST: CPT | Mod: 26,MG

## 2021-05-24 PROCEDURE — 93010 ELECTROCARDIOGRAM REPORT: CPT

## 2021-05-24 PROCEDURE — 99223 1ST HOSP IP/OBS HIGH 75: CPT

## 2021-05-24 RX ORDER — ENOXAPARIN SODIUM 100 MG/ML
90 INJECTION SUBCUTANEOUS
Refills: 0 | Status: DISCONTINUED | OUTPATIENT
Start: 2021-05-24 | End: 2021-05-25

## 2021-05-24 RX ORDER — KETOROLAC TROMETHAMINE 30 MG/ML
30 SYRINGE (ML) INJECTION ONCE
Refills: 0 | Status: DISCONTINUED | OUTPATIENT
Start: 2021-05-24 | End: 2021-05-24

## 2021-05-24 RX ORDER — LATANOPROST 0.05 MG/ML
1 SOLUTION/ DROPS OPHTHALMIC; TOPICAL
Qty: 0 | Refills: 0 | DISCHARGE

## 2021-05-24 RX ORDER — ATORVASTATIN CALCIUM 80 MG/1
20 TABLET, FILM COATED ORAL AT BEDTIME
Refills: 0 | Status: DISCONTINUED | OUTPATIENT
Start: 2021-05-24 | End: 2021-05-26

## 2021-05-24 RX ORDER — AMLODIPINE BESYLATE 2.5 MG/1
10 TABLET ORAL DAILY
Refills: 0 | Status: DISCONTINUED | OUTPATIENT
Start: 2021-05-24 | End: 2021-05-26

## 2021-05-24 RX ORDER — HEPARIN SODIUM 5000 [USP'U]/ML
7500 INJECTION INTRAVENOUS; SUBCUTANEOUS ONCE
Refills: 0 | Status: COMPLETED | OUTPATIENT
Start: 2021-05-24 | End: 2021-05-24

## 2021-05-24 RX ORDER — LISINOPRIL 2.5 MG/1
10 TABLET ORAL DAILY
Refills: 0 | Status: DISCONTINUED | OUTPATIENT
Start: 2021-05-24 | End: 2021-05-26

## 2021-05-24 RX ORDER — HEPARIN SODIUM 5000 [USP'U]/ML
INJECTION INTRAVENOUS; SUBCUTANEOUS
Qty: 25000 | Refills: 0 | Status: DISCONTINUED | OUTPATIENT
Start: 2021-05-24 | End: 2021-05-24

## 2021-05-24 RX ORDER — CHLORHEXIDINE GLUCONATE 213 G/1000ML
1 SOLUTION TOPICAL
Refills: 0 | Status: DISCONTINUED | OUTPATIENT
Start: 2021-05-24 | End: 2021-05-26

## 2021-05-24 RX ADMIN — Medication 30 MILLIGRAM(S): at 10:34

## 2021-05-24 RX ADMIN — HEPARIN SODIUM 7500 UNIT(S): 5000 INJECTION INTRAVENOUS; SUBCUTANEOUS at 16:08

## 2021-05-24 RX ADMIN — HEPARIN SODIUM 1700 UNIT(S)/HR: 5000 INJECTION INTRAVENOUS; SUBCUTANEOUS at 16:08

## 2021-05-24 RX ADMIN — ATORVASTATIN CALCIUM 20 MILLIGRAM(S): 80 TABLET, FILM COATED ORAL at 21:39

## 2021-05-24 RX ADMIN — Medication 30 MILLIGRAM(S): at 16:09

## 2021-05-24 NOTE — ED PROCEDURE NOTE - CARDIAC WINDOWS
Parasternal Long/Parasternal Short/Apical 4-Chamber
Parasternal Long/Parasternal Short/Apical 4-Chamber/Sub-Xyphoid View

## 2021-05-24 NOTE — H&P ADULT - NSHPREVIEWOFSYSTEMS_GEN_ALL_CORE
CONSTITUTIONAL: No weakness, fevers or chills  EYES/ENT: No visual changes;  No vertigo or throat pain   NECK: No pain or stiffness  RESPIRATORY: No cough, wheezing, hemoptysis; No shortness of breath  CARDIOVASCULAR: + chest pain no palpitations  GASTROINTESTINAL: No abdominal or epigastric pain. No nausea, vomiting, or hematemesis; No diarrhea or constipation. No melena or hematochezia.  GENITOURINARY: No dysuria, frequency or hematuria  NEUROLOGICAL: No numbness or weakness  SKIN: No itching, rashes

## 2021-05-24 NOTE — ED ADULT TRIAGE NOTE - HEIGHT IN CM
"              After Visit Summary   4/9/2018    Lang Christina    MRN: [id number]           Patient Information     Date Of Birth          6/3/1926        Visit Information        Provider Department      4/9/2018 2:45 PM Sheng Montoya MD [facility]        Today's Diagnoses     Primary osteoarthritis of both knees    -  1      Care Instructions    You have had a steroid injection today.  For the first 2 hours there will likely be some numbing in the joint from the lidocaine.  This is a good sign, indicating that the injection is in the right place.  In 2 hours the lidocaine will wear off, and the joint will hurt like you had a shot.  Each day the cortisone makes it feel better.  It reaches peak effect in 2 weeks.  We expect it to last for 3 months.  You may resume regular activity when you feel ready.  If you are diabetic, your glucoses will be quite high for several days.            Follow-ups after your visit        Who to contact     If you have questions or need follow up information about today's clinic visit or your schedule please contact [facility] directly at [phone].  Normal or non-critical lab and imaging results will be communicated to you by [employer], letter or phone within 4 business days after the clinic has received the results. If you do not hear from us within 7 days, please contact the clinic through [employer] or phone. If you have a critical or abnormal lab result, we will notify you by phone as soon as possible.  Submit refill requests through [employer] ([employer]) or call your pharmacy and they will forward the refill request to us. Please allow 3 business days for your refill to be completed.          Additional Information About Your Visit        [employer] Information     [employer] ([employer]) lets you send messages to your doctor, view your test results, renew your prescriptions, schedule appointments and more. To sign up, go to [email or web address] ([email or web address]) . Click on \"Log in\" on the " "left side of the screen, which will take you to the Welcome page. Then click on \"Sign up Now\" on the right side of the page.     You will be asked to enter the access code listed below, as well as some personal information. Please follow the directions to create your username and password.     Your access code is: [email or web address]  Expires: 2018  6:43 PM     Your access code will  in 90 days. If you need help or a new code, please call your [facility] or [phone].        Care EveryWhere ID     This is your Care EveryWhere ID. This could be used by other organizations to access your [place] medical records  [id number]        Your Vitals Were     Pulse Respirations BMI (Body Mass Index)             96 18 26.55 kg/m2          Blood Pressure from Last 3 Encounters:   18 147/88   17 118/74   17 122/84    Weight from Last 3 Encounters:   18 73.5 kg (162 lb)   02/15/18 72.6 kg (160 lb)   17 73.2 kg (161 lb 6.4 oz)              We Performed the Following     DRAIN/INJECT LARGE JOINT/BURSA     METHYLPREDNISOLONE 80 MG INJ          Today's Medication Changes          These changes are accurate as of 18  3:17 PM.  If you have any questions, ask your nurse or doctor.               Start taking these medicines.        Dose/Directions    methylPREDNISolone acetate 80 MG/ML injection   Commonly known as:  DEPO-MEDROL   Used for:  Primary osteoarthritis of both knees   Started by:  Sheng Montoya MD        Dose:  320 mg   4 mLs (320 mg) by INTRA-ARTICULAR route once for 1 dose   Quantity:  4 mL   Refills:  0            Where to get your medicines      Some of these will need a paper prescription and others can be bought over the counter.  Ask your nurse if you have questions.     You don't need a prescription for these medications     methylPREDNISolone acetate 80 MG/ML injection                Primary Care Provider Office Phone # Fax #    Micheal Blake [phone] " [phone]       [address] [facility]  [facility] [address]        Equal Access to Services     RAFAYOVANY TRAVIS : Hadii aad ku hadgwyno Soclarisseali, waaxda luqadaha, qaybta kaalmada adehenryda, kaylene huber laleticiahomer kang. So [facility] [phone].    ATENCIÓN: Si habla español, tiene a parra disposición servicios gratuitos de asistencia lingüística. Llame al [phone].    We comply with applicable federal civil rights laws and [place] laws. We do not discriminate on the basis of race, color, national origin, age, disability, sex, sexual orientation, or gender identity.            Thank you!     Thank you for choosing [facility]  for your care. Our goal is always to provide you with excellent care. Hearing back from our patients is one way we can continue to improve our services. Please take a few minutes to complete the written survey that you may receive in the mail after your visit with us. Thank you!             Your Updated Medication List - Protect others around you: Learn how to safely use, store and throw away your medicines at www.disposemymeds.org.          This list is accurate as of 4/9/18  3:17 PM.  Always use your most recent med list.                   Brand Name Dispense Instructions for use Diagnosis    * albuterol (2.5 MG/3ML) 0.083% neb solution     270 mL    INHALE CONTENTS OF ONE VIAL VIA NEBULIZER EVERY SIX HOURS AS NEEDED FOR SHORTNESS OF BREATH/WHEEZING    Centrilobular emphysema (H)       * albuterol (2.5 MG/3ML) 0.083% neb solution     270 mL    INHALE CONTENTS OF ONE VIAL VIA NEBULIZER EVERY SIX HOURS AS NEEDED FOR SHORTNESS OF BREATH/WHEEZING    Centrilobular emphysema (H)       cyanocobalamin 1000 MCG Tabs      Take  by mouth daily.        diclofenac 1 % Gel topical gel    VOLTAREN    1 Tube    Place 2 g onto the skin 4 times daily    Stiffness of neck       Ipratropium-Albuterol  MCG/ACT inhaler    COMBIVENT RESPIMAT    1 Inhaler    Inhale 1 puff into the lungs 4  180.34 times daily    Centrilobular emphysema (H)       methylPREDNISolone acetate 80 MG/ML injection    DEPO-MEDROL    4 mL    4 mLs (320 mg) by INTRA-ARTICULAR route once for 1 dose    Primary osteoarthritis of both knees       order for DME     1 Device    Equipment being ordered: nebulizer machine    COPD (chronic obstructive pulmonary disease) (H)       * simvastatin 80 MG tablet    ZOCOR    45 tablet    TAKE ONE-HALF TABLET BY MOUTH EVERY NIGHT AT BEDTIME    Hyperlipidemia LDL goal <130       * simvastatin 10 MG tablet    ZOCOR    90 tablet    Take 1 tablet (10 mg) by mouth At Bedtime    Hyperlipidemia LDL goal <130       TYLENOL 325 MG tablet   Generic drug:  acetaminophen      Take 1-2 tablets by mouth at onset of headache.        vitamin D 1000 UNITS capsule      Take 1 capsule by mouth daily.        * Notice:  This list has 4 medication(s) that are the same as other medications prescribed for you. Read the directions carefully, and ask your doctor or other care provider to review them with you.

## 2021-05-24 NOTE — ED ADULT NURSE NOTE - NS ED NURSE LEVEL OF CONSCIOUSNESS AFFECT
Called patient to schedule a screening mammogram PATIENTPHONEMESSAGE: left message.-     Additional information NA  
Calm

## 2021-05-24 NOTE — ED PROVIDER NOTE - PROGRESS NOTE DETAILS
Dr. Mckinley: Sign out to Dr. Wise   Patient pending CTA for PE workup bilateral extensive PE's on US, no right heart strain, BNP/TRop normal, MAR and pt aware, will admit to medicine

## 2021-05-24 NOTE — CHART NOTE - NSCHARTNOTEFT_GEN_A_CORE
JULIO spoke to pt in the ED.  Pt came to ED due to chest pain.   Pt's PCP is Dr. Yenni Capps.  Pt will go home at the point of discharge.  Pt currently has no home care needs pt stated he has a supportive family that helps pt manage his health.  Pt denies any financial, legal, housing, or other social issues.  SW provided supportive counseling.  Case Management will remain available if needs present prior to d/c.

## 2021-05-24 NOTE — H&P ADULT - TIME BILLING
medical record review, consultant discussion, coordination of care, as well as discussing all findings and plan of care with patient.

## 2021-05-24 NOTE — H&P ADULT - NSICDXFAMILYHX_GEN_ALL_CORE_FT
FAMILY HISTORY:  Father  Still living? Unknown  No family history of cardiac disease, Age at diagnosis: Age Unknown  No family history of cerebrovascular accident (CVA), Age at diagnosis: Age Unknown    Mother  Still living? Unknown  No family history of cardiac disease, Age at diagnosis: Age Unknown  No family history of cerebrovascular accident (CVA), Age at diagnosis: Age Unknown    Sibling  Still living? Unknown  No family history of cardiac disease, Age at diagnosis: Age Unknown  No family history of cerebrovascular accident (CVA), Age at diagnosis: Age Unknown

## 2021-05-24 NOTE — H&P ADULT - NSHPPHYSICALEXAM_GEN_ALL_CORE
GENERAL: NAD, speaks in full sentences, no signs of respiratory distress  HEAD:  Atraumatic, Normocephalic  EYES: EOMI, PERRLA, non-icteric, no injected sclera  NECK: Supple, No JVD  CHEST/LUNG: Clear to auscultation bilaterally; No wheeze; No crackles; No accessory muscles used  HEART: Regular rate and rhythm; No murmurs;   ABDOMEN: Soft, Nontender, Nondistended; Bowel sounds present; No guarding  EXTREMITIES:  2+ Peripheral Pulses, No cyanosis or edema  PSYCH: AAOx3  NEUROLOGY: non-focal  SKIN: No rashes or lesions ICU Vital Signs Last 24 Hrs  T(C): 36.6 (24 May 2021 17:29), Max: 36.7 (24 May 2021 09:10)  T(F): 97.9 (24 May 2021 17:29), Max: 98 (24 May 2021 09:10)  HR: 71 (24 May 2021 17:29) (71 - 105)  BP: 160/82 (24 May 2021 17:29) (133/76 - 160/82)  RR: 18 (24 May 2021 17:29) (18 - 19)  SpO2: 99% (24 May 2021 17:29) (96% - 99%)    GENERAL: NAD, speaks in full sentences, no signs of respiratory distress  HEAD:  Atraumatic, Normocephalic  EYES: EOMI, PERRLA, non-icteric, no injected sclera  NECK: Supple, No JVD  CHEST/LUNG: Clear to auscultation bilaterally; No wheeze; No crackles; No accessory muscles used  HEART: Regular rate and rhythm; No murmurs;   ABDOMEN: Soft, Nontender, Nondistended; Bowel sounds present; No guarding  EXTREMITIES:  2+ Peripheral Pulses, No cyanosis or edema  PSYCH: AAOx3  NEUROLOGY: non-focal  SKIN: No rashes or lesions

## 2021-05-24 NOTE — H&P ADULT - ATTENDING COMMENTS
77 yo gentleman, with PMX HTN, active, works as a , presents with right sided rib/chest pain for a few days    Acute Unprovoked PE  CTA with extensive b/l PE  patient does not report sedentary lifestlyle, denies h/o prior COVID infection  past medical records reviewed: patient had a colonoscopy 9/25/18 with bx significant for fragments of tubular adenoma  currently feels well, denies any active complaints, remains hemodynamically stable. 99% on RA  case discussed with IR, not a candidate for intervention  check LE duplex and 2d echo  trend CE x3  check COVID Ab  can switch heparin GTT to Lovenox therapeutic  once workup complete, will discharge on eliquis    #Progress Note Handoff  Pending (specify):  LE duplex, echo, monitor CBC on AC  Family discussion: Plan of care discussed with patient, aware and agreeable   Disposition:  fully functional from home   Plan of care discussed with admitting resident as well    Patricia hWite MD  s. [phone]

## 2021-05-24 NOTE — H&P ADULT - ASSESSMENT
77 y/o Male PMH of HTN presents with right sided pleuritic chest pain.    #unprovoked b/l extensive PE without evidence of heart strain- currently pain free and satting 98% on room air  -c/w heparin drip (started at 4pm) next ptt 10pm -consider switching to eliquis in am  -Dimer 4200, CTA findings as above without evidence of right heart strain-  -echo, LE Duplex  -out Pt. hypercoagulable work up  -age appropriate cancer screening  -trop negative x1 will trend, EKG NSR, repeat EKG in am  -BNP was 22    #Hx of HTN  -lisinorpril 10    #DVT ppx- on heparin drip  #GI ppx- not indicated 75 y/o Male PMH of HTN presents with right sided pleuritic chest pain.    #unprovoked b/l extensive PE without evidence of heart strain- currently pain free and satting 98% on room air  -d/c  heparin drip start lovenox 90mg BID  -Dimer 4200, CTA findings as above without evidence of right heart strain-  -attending spoke with IR -not candidate for intervention  -echo, LE Duplex  -out Pt. hypercoagulable work up  -age appropriate cancer screening  -trop negative x1 will trend, EKG NSR, repeat EKG in am  -BNP was 22    #Hx of HTN  -lisinorpril 10  -amlodipine 10    #DLD- on lipitor    #DVT ppx- on heparin drip  #GI ppx- not indicated

## 2021-05-24 NOTE — H&P ADULT - NSHPLABSRESULTS_GEN_ALL_CORE
12.9   7.79  )-----------( 224      ( 24 May 2021 09:49 )             39.3     05-24    140  |  104  |  10  ----------------------------<  124<H>  4.2   |  24  |  0.9    Ca    9.3      24 May 2021 09:49  Mg     1.9     05-24    TPro  7.1  /  Alb  4.3  /  TBili  0.5  /  DBili  x   /  AST  24  /  ALT  24  /  AlkPhos  117<H>  05-24      < from: CT Angio Chest PE Protocol w/ IV Cont (05.24.21 @ 15:26) >      1.  Extensive bilateral pulmonary emboli. Emboli in the right interlobar artery with extension into middle and lower lobar arteries and numerous segmental branches. Emboli in the left upper and lower lobar arteries with extension into multiple segmental branches. No definitive CT evidence of right heart strain.  2.  Patchy predominantly peripheralright lower lobe opacities, may be infectious. Developing infarct not excluded.    Findings discussed with CORY Rader on 5/24/2021 at 3:42 PM.        < end of copied text >    < from: Xray Chest 2 Views PA/Lat (05.24.21 @ 12:31) >    Skeleton/soft tissues: Degenerative changes of the spine.    Impression:    No radiographic evidence of acute cardiopulmonary disease.    < end of copied text >    < from: 12 Lead ECG (05.24.21 @ 09:11) >      Ventricular Rate 99 BPM    Atrial Rate 99 BPM    P-R Interval 162 ms    QRS Duration 88 ms    Q-T Interval 370 ms    QTC Calculation(Bazett) 474 ms    P Axis 43 degrees    R Axis -7 degrees    T Axis 26 degrees      < end of copied text > 12.9   7.79  )-----------( 224      ( 24 May 2021 09:49 )             39.3     140  |  104  |  10  ----------------------------<  124<H>  4.2   |  24  |  0.9    Ca    9.3      24 May 2021 09:49  Mg     1.9     05-24    TPro  7.1  /  Alb  4.3  /  TBili  0.5  /  DBili  x   /  AST  24  /  ALT  24  /  AlkPhos  117<H>  05-24    < from: CT Angio Chest PE Protocol w/ IV Cont (05.24.21 @ 15:26) >    1.  Extensive bilateral pulmonary emboli. Emboli in the right interlobar artery with extension into middle and lower lobar arteries and numerous segmental branches. Emboli in the left upper and lower lobar arteries with extension into multiple segmental branches. No definitive CT evidence of right heart strain.  2.  Patchy predominantly peripheralright lower lobe opacities, may be infectious. Developing infarct not excluded.    Findings discussed with CORY Rader on 5/24/2021 at 3:42 PM.    < end of copied text >    < from: Xray Chest 2 Views PA/Lat (05.24.21 @ 12:31) >    Skeleton/soft tissues: Degenerative changes of the spine.    Impression:    No radiographic evidence of acute cardiopulmonary disease.    < end of copied text >    < from: 12 Lead ECG (05.24.21 @ 09:11) >    Ventricular Rate 99 BPM    Atrial Rate 99 BPM    P-R Interval 162 ms    QRS Duration 88 ms    Q-T Interval 370 ms    QTC Calculation(Bazett) 474 ms    P Axis 43 degrees    R Axis -7 degrees    T Axis 26 degrees    < end of copied text >

## 2021-05-24 NOTE — ED ADULT NURSE NOTE - OBJECTIVE STATEMENT
patient states has right rib pain for 4 days denies trauma. states pain travels to chest. denies sob states pain is worse when coughs.

## 2021-05-24 NOTE — H&P ADULT - HISTORY OF PRESENT ILLNESS
77 y/o Male PMH of HTN presents with right sided chest pain.  Pt. reports 4 days of sudden onset, constant,  pleuritic right sided chest pain. Pain worse with deep inspiration no alleviating factors. Pain not assocuated with exertion, no diaphoresis, no radiation, no SOB or orthopnea. No cough, fevers, or weakness.  In ED vitals temp 98  /74 Satting 98% on room air. EKG showed NSR, trop neg, BNP 22 DDimer 4200. Bedside US showed b/l PE without heart strain, CT showed extensive PE of right interlobar artery extenstion to middle and lower lobar arteries and numerous segmental branches. Also with Emboli left upper and lower lobar arteries. No evidence of right heart strain. Patchy peripheral lower lobe opacities etiology infectious vs. developing infract.  Pt. started on heparin drip and given ketorolac for pain which has improved. 77 y/o Male PMH of HTN presents with right sided chest pain.  Pt. reports 4 days of sudden onset, constant,  pleuritic right sided chest pain. Pain worse with deep inspiration no alleviating factors. Pain not associated with exertion, no diaphoresis, no radiation, no SOB or orthopnea. No cough, fevers, or weakness.  In ED vitals temp 98  /74 Satting 98% on room air. EKG showed NSR, trop neg, BNP 22 DDimer 4200. Bedside US showed b/l PE without heart strain, CT showed extensive PE of right interlobar artery extenstion to middle and lower lobar arteries and numerous segmental branches. Also with Emboli left upper and lower lobar arteries. No evidence of right heart strain. Patchy peripheral lower lobe opacities etiology infectious vs. developing infract.  Pt. started on heparin drip and given ketorolac for pain which has improved.

## 2021-05-24 NOTE — ED PROVIDER NOTE - CLINICAL SUMMARY MEDICAL DECISION MAKING FREE TEXT BOX
pt presents for eval for R chest pain x 4 days. ed w/u showing multiple PE on CT w/o RH strain.  trop/bnp negative. bedside echo w/o RH strain will admit to med for further tx and w/u.

## 2021-05-24 NOTE — ED PROVIDER NOTE - OBJECTIVE STATEMENT
Pt is a 77y/o male with a pmhx of HTN presents today for eval of right sided chest pain x 4 days that has been constant, progressively worsening, worsened with deep breaths. Pt denies fever, chills, weakness, numbness, cough, CP, SOB.

## 2021-05-24 NOTE — ED PROVIDER NOTE - NS ED ROS FT
Eyes:  No visual changes, eye pain or discharge.  ENMT:  No hearing changes, pain, discharge or infections. No neck pain or stiffness.  Cardiac:  + CP + SOB No edema  Respiratory:  No cough or respiratory distress. No hemoptysis. No history of asthma or RAD.  GI:  No nausea, vomiting, diarrhea or abdominal pain.  :  No dysuria, frequency or burning.  MS:  No myalgia, muscle weakness, joint pain or back pain.  Neuro:  No headache or weakness.  No LOC.  Skin:  No skin rash.   Endocrine: No history of thyroid disease or diabetes.  Except as documented in the HPI,  all other systems are negative.

## 2021-05-25 ENCOUNTER — TRANSCRIPTION ENCOUNTER (OUTPATIENT)
Age: 76
End: 2021-05-25

## 2021-05-25 PROCEDURE — 99252 IP/OBS CONSLTJ NEW/EST SF 35: CPT

## 2021-05-25 PROCEDURE — 99233 SBSQ HOSP IP/OBS HIGH 50: CPT

## 2021-05-25 PROCEDURE — 93010 ELECTROCARDIOGRAM REPORT: CPT

## 2021-05-25 RX ORDER — ENOXAPARIN SODIUM 100 MG/ML
90 INJECTION SUBCUTANEOUS
Refills: 0 | Status: DISCONTINUED | OUTPATIENT
Start: 2021-05-25 | End: 2021-05-26

## 2021-05-25 RX ORDER — APIXABAN 2.5 MG/1
1 TABLET, FILM COATED ORAL
Qty: 60 | Refills: 0
Start: 2021-05-25 | End: 2021-06-23

## 2021-05-25 RX ORDER — ACETAMINOPHEN 500 MG
650 TABLET ORAL EVERY 8 HOURS
Refills: 0 | Status: DISCONTINUED | OUTPATIENT
Start: 2021-05-25 | End: 2021-05-26

## 2021-05-25 RX ADMIN — ENOXAPARIN SODIUM 90 MILLIGRAM(S): 100 INJECTION SUBCUTANEOUS at 05:23

## 2021-05-25 RX ADMIN — AMLODIPINE BESYLATE 10 MILLIGRAM(S): 2.5 TABLET ORAL at 05:23

## 2021-05-25 RX ADMIN — LISINOPRIL 10 MILLIGRAM(S): 2.5 TABLET ORAL at 05:23

## 2021-05-25 RX ADMIN — Medication 650 MILLIGRAM(S): at 18:13

## 2021-05-25 RX ADMIN — Medication 650 MILLIGRAM(S): at 21:44

## 2021-05-25 RX ADMIN — ENOXAPARIN SODIUM 90 MILLIGRAM(S): 100 INJECTION SUBCUTANEOUS at 17:11

## 2021-05-25 RX ADMIN — ATORVASTATIN CALCIUM 20 MILLIGRAM(S): 80 TABLET, FILM COATED ORAL at 21:44

## 2021-05-25 NOTE — DISCHARGE NOTE PROVIDER - CARE PROVIDER_API CALL
Lonnie Sweeney  CRITICAL CARE MEDICINE  [address] [place] [address]  Phone: [phone]  Fax: [phone]  Follow Up Time: 2 weeks

## 2021-05-25 NOTE — CONSULT NOTE ADULT - SUBJECTIVE AND OBJECTIVE BOX
VASCULAR SURGERY CONSULT NOTE      HPI:  77 y/o Male PMH of HTN presents with right sided chest pain.  Pt. reports 4 days of sudden onset, constant,  pleuritic right sided chest pain. Pain worse with deep inspiration no alleviating factors. Pain not associated with exertion, no diaphoresis, no radiation, no SOB or orthopnea. No cough, fevers, or weakness.  In ED vitals temp 98  /74 Satting 98% on room air. EKG showed NSR, trop neg, BNP 22 DDimer 4200. Bedside US showed b/l PE without heart strain, CT showed extensive PE of right interlobar artery extenstion to middle and lower lobar arteries and numerous segmental branches. Also with Emboli left upper and lower lobar arteries. No evidence of right heart strain. Patchy peripheral lower lobe opacities etiology infectious vs. developing infract.  Pt. started on heparin drip and given ketorolac for pain which has improved. (24 May 2021 17:03)        PAST MEDICAL & SURGICAL HISTORY:  HTN (hypertension)    No significant past surgical history      aspirin (Nausea)    Home Medications:  amLODIPine 10 mg oral tablet: 1 tab(s) orally once a day (24 May 2021 18:26)  Lipitor 20 mg oral tablet: 1 tab(s) orally once a day (24 May 2021 18:26)  lisinopril 10 mg oral tablet: 1 tab(s) orally once a day (21 Sep 2018 12:46)    No permtinent family history of PVD    REVIEW OF SYSTEMS:  GENERAL:                                         negative  SKIN:                                                 negative  OPTHALMOLOGIC:                          negative  ENMT:                                               negative  RESPIRATORY AND THORAX:        negative  CARDIOVASCULAR:                        see HPI  GASTROINTESTINAL:                       negative  NEPHROLOGY:                                  negative  MUSCULOSKELETAL:                       negative  NEUROLOGIC:                                   negative  PSYCHIATRIC:                                    negative  HEMATOLOGY/LYMPHATICS:         negative  ENDOCRINE:                                     negative  ALLERGIC/IMMUNOLOGIC:            negative    12 point ROS otherwise normal except as stated in HPI    PHYSICAL EXAM  Vital Signs Last 24 Hrs  T(C): 36.4 (25 May 2021 04:10), Max: 36.6 (24 May 2021 17:29)  T(F): 97.6 (25 May 2021 04:10), Max: 97.9 (24 May 2021 17:29)  HR: 71 (25 May 2021 04:10) (71 - 77)  BP: 146/71 (25 May 2021 04:10) (133/76 - 169/83)  BP(mean): --  RR: 18 (25 May 2021 04:10) (18 - 19)  SpO2: 98% (25 May 2021 05:30) (96% - 99%)    Appearance: Normal	  HEENT:   Normal oral mucosa, PERRL, EOMI	  Neck: Supple, - JVD;   Cardiovascular: Normal S1 S2, No JVD, No murmurs,   Respiratory: Lungs clear to auscultation, No Rales, Rhonchi, Wheezing	  Gastrointestinal:  Soft, Non-tender, positive BS	  Skin: No rashes, No ecchymoses, No cyanosis  Extremities: Normal range of motion, No clubbing, cyanosis or edema  Neurologic: Non-focal  Psychiatry: A & O x 3, Mood & affect appropriate        MEDICATIONS:   MEDICATIONS  (STANDING):  amLODIPine   Tablet 10 milliGRAM(s) Oral daily  atorvastatin 20 milliGRAM(s) Oral at bedtime  chlorhexidine 4% Liquid 1 Application(s) Topical <User Schedule>  lisinopril 10 milliGRAM(s) Oral daily    MEDICATIONS  (PRN):      LAB/STUDIES:                        12.9   7.79  )-----------( 224      ( 24 May 2021 09:49 )             39.3     05-24    140  |  104  |  10  ----------------------------<  124<H>  4.2   |  24  |  0.9    Ca    9.3      24 May 2021 09:49  Mg     1.9     05-24    TPro  7.1  /  Alb  4.3  /  TBili  0.5  /  DBili  x   /  AST  24  /  ALT  24  /  AlkPhos  117<H>  05-24    PT/INR - ( 24 May 2021 10:40 )   PT: 12.80 sec;   INR: 1.11 ratio         PTT - ( 24 May 2021 10:40 )  PTT:32.2 sec  LIVER FUNCTIONS - ( 24 May 2021 09:49 )  Alb: 4.3 g/dL / Pro: 7.1 g/dL / ALK PHOS: 117 U/L / ALT: 24 U/L / AST: 24 U/L / GGT: x               CARDIAC MARKERS ( 24 May 2021 09:49 )  x     / <0.01 ng/mL / x     / x     / x              IMAGING:      < from: [facility] Duplex Lower Ext Vein Scan, Bilat (05.24.21 @ 19:31) >  Acute deep vein thrombus in the left common femoral vein.  No evidence of acute deep vein thrombus or superficial thrombosis of the right lower extremity.      < from: CT Angio Chest PE Protocol w/ IV Cont (05.24.21 @ 15:26) >  1.  Extensive bilateral pulmonary emboli. Emboli in the right interlobar artery with extension into middle and lower lobar arteries and numerous segmental branches. Emboli in the left upper and lower lobar arteries with extension into multiple segmental branches. No definitive CT evidence of right heart strain.  2.  Patchy predominantly peripheralright lower lobe opacities, may be infectious. Developing infarct not excluded.

## 2021-05-25 NOTE — DISCHARGE NOTE PROVIDER - NSDCCPCAREPLAN_GEN_ALL_CORE_FT
PRINCIPAL DISCHARGE DIAGNOSIS  Diagnosis: Pulmonary embolism  Assessment and Plan of Treatment: You presented with chest pain and we found that you have a pulmonary embolism after performing ultrasound study of the chest as well as CT angio scan of the chest. We atrated you on anti caogulation medication, heparin then transitionion to therapeutic lovenox. We did not intervene because there was no strain to the heart. Thus we recommend taking Eliquis for 30 days as prescribed.   Please follow up with the pulmonary doctor listed on the follow up.      SECONDARY DISCHARGE DIAGNOSES  Diagnosis: DVT, recurrent, lower extremity, acute  Assessment and Plan of Treatment: left common femoral dvt was found on the venous duplex. For now continue to take the eiliquis as prescribed. You were seen by vascular surgery team and they di not suggest any intervention.

## 2021-05-25 NOTE — PROGRESS NOTE ADULT - ASSESSMENT
75 y/o Male PMH of HTN presents with right sided pleuritic chest pain.    #unprovoked b/l extensive PE without evidence of heart strain- currently pain free and satting 98% on room air  -d/c  heparin drip start lovenox 90mg BID  -Dimer 4200, CTA findings as above without evidence of right heart strain-  -attending spoke with IR -not candidate for intervention  -echo pending, LE Duplex significant for left common femoral dvt  - Vascular consult appreciated, no intervention, o/p f/u  -out Pt. hypercoagulable work up  -age appropriate cancer screening  -trop negative x1 will trend, EKG NSR  -BNP was 22  - Eliquis approved, will switch once echo is read.     #Hx of HTN  -lisinorpril 10  -amlodipine 10    #DLD- on lipitor    #DVT ppx- therapeutic lovenox  #GI ppx- not indicated

## 2021-05-25 NOTE — PROGRESS NOTE ADULT - ASSESSMENT
77 yo gentleman, with PMX HTN, active, works as a , presents with right sided rib/chest pain for a few days    Acute Unprovoked PE  CTA with extensive b/l PE  L femoral DVT  patient does not report sedentary lifestlyle, denies h/o prior COVID infection  past medical records reviewed: patient had a colonoscopy 9/25/18 with bx significant for fragments of tubular adenoma  currently feels well, denies any active complaints, remains hemodynamically stable. 99% on RA  case discussed with IR yesterday, not a candidate for intervention  LE duplex with L femoral DVT. Vascular following, c/w AC and follow up as OP   check  2d echo  trend CE x3  check COVID Ab  c/w lovenox for now, can switch to Eliquis once 2d echo done/read  CM pricing eliquis on discharge   patient requesting pulmonary referral on discharge     #Progress Note Handoff  Pending (specify):   echo, monitor CBC on AC, CM pricing eliquis  Family discussion: Plan of care discussed with patient, aware and agreeable   Disposition:  fully functional from home, anticipate dc tomorrow if echo neg    Patricia White MD  s. [phone].

## 2021-05-25 NOTE — DISCHARGE NOTE PROVIDER - HOSPITAL COURSE
75 y/o Male PMH of HTN presents with right sided chest pain.  Pt. reports 4 days of sudden onset, constant,  pleuritic right sided chest pain. Pain worse with deep inspiration no alleviating factors. Pain not associated with exertion, no diaphoresis, no radiation, no SOB or orthopnea. No cough, fevers, or weakness.  In ED vitals temp 98  /74 Satting 98% on room air. EKG showed NSR, trop neg, BNP 22 DDimer 4200. Bedside US showed b/l PE without heart strain, CT showed extensive PE of right interlobar artery extenstion to middle and lower lobar arteries and numerous segmental branches. Also with Emboli left upper and lower lobar arteries. No evidence of right heart strain. Patchy peripheral lower lobe opacities etiology infectious vs. developing infract.  Pt. started on heparin drip and given ketorolac for pain which has improved.

## 2021-05-25 NOTE — CONSULT NOTE ADULT - ASSESSMENT
75 y/o Male PMH of HTN presents with 4 days of sudden onset, constant,  pleuritic right sided chest pain.  Found extensive b/l PE's, no heart strain, and left femoral DVT. Not a candidate for pulmonary thrombectomy as per IR    Vascular surgery called for evaluation and further management of DVT    No need for any vascular procedures at this time.  Continue AC. May switch Lovenox to Eliquis or Xarelto, for at least 6 months  Follow up with vascular as outpt in 3-4 weeks to repeat venous dplx while on oral AC  Dr. Persaud [phone]    [facility] [id number]

## 2021-05-25 NOTE — DISCHARGE NOTE PROVIDER - NSDCMRMEDTOKEN_GEN_ALL_CORE_FT
amLODIPine 10 mg oral tablet: 1 tab(s) orally once a day  apixaban 5 mg oral tablet: 1 tab(s) orally 2 times a day   Lipitor 20 mg oral tablet: 1 tab(s) orally once a day  lisinopril 10 mg oral tablet: 1 tab(s) orally once a day   amLODIPine 10 mg oral tablet: 1 tab(s) orally once a day  apixaban 5 mg oral tablet: 2 tab(s) orally 2 times a day for 7 days  then 1 tab orally 2 times a day for 23 days  Lipitor 20 mg oral tablet: 1 tab(s) orally once a day  lisinopril 10 mg oral tablet: 1 tab(s) orally once a day   acetaminophen 325 mg oral tablet: 2 tab(s) orally every 8 hours  amLODIPine 10 mg oral tablet: 1 tab(s) orally once a day  apixaban 5 mg oral tablet: 2 tab(s) orally 2 times a day for 7 days  then 1 tab orally 2 times a day for 23 days  Aspercreme Odor Free Max Strength Lidocaine Patches X-Large 4% topical film: Apply topically to affected area once a day   Lipitor 20 mg oral tablet: 1 tab(s) orally once a day  lisinopril 10 mg oral tablet: 1 tab(s) orally once a day

## 2021-05-26 ENCOUNTER — TRANSCRIPTION ENCOUNTER (OUTPATIENT)
Age: 76
End: 2021-05-26

## 2021-05-26 VITALS
RESPIRATION RATE: 18 BRPM | SYSTOLIC BLOOD PRESSURE: 118 MMHG | DIASTOLIC BLOOD PRESSURE: 51 MMHG | TEMPERATURE: 98 F | HEART RATE: 79 BPM

## 2021-05-26 LAB
ANION GAP SERPL CALC-SCNC: 11 MMOL/L — SIGNIFICANT CHANGE UP (ref 7–14)
BUN SERPL-MCNC: 12 MG/DL — SIGNIFICANT CHANGE UP (ref 10–20)
CALCIUM SERPL-MCNC: 9 MG/DL — SIGNIFICANT CHANGE UP (ref 8.5–10.1)
CHLORIDE SERPL-SCNC: 99 MMOL/L — SIGNIFICANT CHANGE UP (ref 98–110)
CO2 SERPL-SCNC: 22 MMOL/L — SIGNIFICANT CHANGE UP (ref 17–32)
CREAT SERPL-MCNC: 0.9 MG/DL — SIGNIFICANT CHANGE UP (ref 0.7–1.5)
GLUCOSE SERPL-MCNC: 147 MG/DL — HIGH (ref 70–99)
HCT VFR BLD CALC: 36.9 % — LOW (ref 42–52)
HGB BLD-MCNC: 12.2 G/DL — LOW (ref 14–18)
MCHC RBC-ENTMCNC: 25.4 PG — LOW (ref 27–31)
MCHC RBC-ENTMCNC: 33.1 G/DL — SIGNIFICANT CHANGE UP (ref 32–37)
MCV RBC AUTO: 76.9 FL — LOW (ref 80–94)
NRBC # BLD: 0 /100 WBCS — SIGNIFICANT CHANGE UP (ref 0–0)
PLATELET # BLD AUTO: 253 K/UL — SIGNIFICANT CHANGE UP (ref 130–400)
POTASSIUM SERPL-MCNC: 4.2 MMOL/L — SIGNIFICANT CHANGE UP (ref 3.5–5)
POTASSIUM SERPL-SCNC: 4.2 MMOL/L — SIGNIFICANT CHANGE UP (ref 3.5–5)
RBC # BLD: 4.8 M/UL — SIGNIFICANT CHANGE UP (ref 4.7–6.1)
RBC # FLD: 13.2 % — SIGNIFICANT CHANGE UP (ref 11.5–14.5)
SODIUM SERPL-SCNC: 132 MMOL/L — LOW (ref 135–146)
WBC # BLD: 11.08 K/UL — HIGH (ref 4.8–10.8)
WBC # FLD AUTO: 11.08 K/UL — HIGH (ref 4.8–10.8)

## 2021-05-26 PROCEDURE — 99239 HOSP IP/OBS DSCHRG MGMT >30: CPT

## 2021-05-26 PROCEDURE — 99252 IP/OBS CONSLTJ NEW/EST SF 35: CPT

## 2021-05-26 PROCEDURE — 93306 TTE W/DOPPLER COMPLETE: CPT | Mod: 26

## 2021-05-26 RX ORDER — ACETAMINOPHEN 500 MG
2 TABLET ORAL
Qty: 180 | Refills: 0
Start: 2021-05-26 | End: 2021-06-24

## 2021-05-26 RX ORDER — LIDOCAINE 4 G/100G
1 CREAM TOPICAL
Qty: 15 | Refills: 0
Start: 2021-05-26 | End: 2021-06-09

## 2021-05-26 RX ORDER — APIXABAN 2.5 MG/1
1 TABLET, FILM COATED ORAL
Qty: 74 | Refills: 0
Start: 2021-05-26 | End: 2021-06-24

## 2021-05-26 RX ORDER — OXYCODONE AND ACETAMINOPHEN 5; 325 MG/1; MG/1
1 TABLET ORAL EVERY 6 HOURS
Refills: 0 | Status: DISCONTINUED | OUTPATIENT
Start: 2021-05-26 | End: 2021-05-26

## 2021-05-26 RX ADMIN — AMLODIPINE BESYLATE 10 MILLIGRAM(S): 2.5 TABLET ORAL at 06:08

## 2021-05-26 RX ADMIN — Medication 650 MILLIGRAM(S): at 07:00

## 2021-05-26 RX ADMIN — LISINOPRIL 10 MILLIGRAM(S): 2.5 TABLET ORAL at 06:08

## 2021-05-26 RX ADMIN — Medication 650 MILLIGRAM(S): at 06:08

## 2021-05-26 RX ADMIN — OXYCODONE AND ACETAMINOPHEN 1 TABLET(S): 5; 325 TABLET ORAL at 06:53

## 2021-05-26 RX ADMIN — Medication 650 MILLIGRAM(S): at 13:34

## 2021-05-26 RX ADMIN — ENOXAPARIN SODIUM 90 MILLIGRAM(S): 100 INJECTION SUBCUTANEOUS at 06:08

## 2021-05-26 RX ADMIN — OXYCODONE AND ACETAMINOPHEN 1 TABLET(S): 5; 325 TABLET ORAL at 07:30

## 2021-05-26 RX ADMIN — Medication 650 MILLIGRAM(S): at 13:29

## 2021-05-26 NOTE — PROGRESS NOTE ADULT - SUBJECTIVE AND OBJECTIVE BOX
pt seen and examined. pt states that his breathing is better but he was not able to sleep last night due to pain ( on deep breathing) which improved with meds given .     Vital Signs Last 24 Hrs  T(C): 36.8 (26 May 2021 13:48), Max: 37.4 (25 May 2021 21:30)  T(F): 98.3 (26 May 2021 13:48), Max: 99.4 (25 May 2021 21:30)  HR: 79 (26 May 2021 13:48) (77 - 79)  BP: 118/51 (26 May 2021 13:48) (112/55 - 128/68)  RR: 18 (26 May 2021 13:48) (18 - 18)  SpO2: 93% (26 May 2021 06:12) (93% - 93%)    Physical exam:   constitutional NAD, AAOX3, Respiratory  lungs CTA, CVS heart RRR, GI: abdomen Soft NT, ND, BS+, skin: intact  neuro exam non focal.                         12.2   11.08 )-----------( 253      ( 26 May 2021 11:36 )             36.9     05-26    132<L>  |  99  |  12  ----------------------------<  147<H>  4.2   |  22  |  0.9    Ca    9.0      26 May 2021 11:36    D-Dimer Assay, Quantitative: 4200 ng/mL DDU [0 - 230] (05-24-21 @ 10:40)    COVID-19 PCR: NotDetec (05-24-21 @ 16:55)    < from: CT Angio Chest PE Protocol w/ IV Cont (05.24.21 @ 15:26) >  1.  Extensive bilateral pulmonary emboli. Emboli in the right interlobar artery with extension into middle and lower lobar arteries and numerous segmental branches. Emboli in the left upper and lower lobar arteries with extension into multiple segmental branches. No definitive CT evidence of right heart strain.  2.  Patchy predominantly peripheralright lower lobe opacities, may be infectious. Developing infarct not excluded.    < end of copied text >    MEDICATIONS  (STANDING):  acetaminophen   Tablet .. 650 milliGRAM(s) Oral every 8 hours  amLODIPine   Tablet 10 milliGRAM(s) Oral daily  atorvastatin 20 milliGRAM(s) Oral at bedtime  chlorhexidine 4% Liquid 1 Application(s) Topical <User Schedule>  enoxaparin Injectable 90 milliGRAM(s) SubCutaneous two times a day  lisinopril 10 milliGRAM(s) Oral daily    MEDICATIONS  (PRN):  oxycodone    5 mG/acetaminophen 325 mG 1 Tablet(s) Oral every 6 hours PRN Severe Pain (7 - 10)    PAST MEDICAL & SURGICAL HISTORY:  HTN (hypertension)    No significant past surgical history    < from: TTE Echo Complete w/o Contrast w/ Doppler (05.26.21 @ 06:57) >  Summary:   1. LV Ejection Fraction by Coronado's Method with a biplane EF of 66 %.   2. Moderate concentric left ventricular hypertrophy.   3. Spectral Doppler shows impaired relaxation pattern of left ventricular myocardial filling (Grade I diastolic dysfunction).   4. Mildly enlarged left atrium.   5. Mildly enlarged right atrium.   6. Trace mitral valve regurgitation.   7. Mild-moderate tricuspid regurgitation.   8. Mild pulmonic valve regurgitation.   9. Estimated pulmonary artery systolic pressure is 43.2 mmHg assuming a right atrial pressure of 3 mmHg, which is consistent with mild pulmonary hypertension.    < end of copied text >    a/p  # PE cont ac, change to eliquis  # HTN , DL cont meds    may dc home from medical stand point  followup with PMD Dr smalls     time spent 35 min       
PRADEEP GARCIA  76y Male    CHIEF COMPLAINT:    Patient is a 76y old  Male who presents with a chief complaint of R sided Rib/chest pain (25 May 2021 12:20)      INTERVAL HPI/OVERNIGHT EVENTS:    Patient seen and examined. No acute events overnight. Feels well, denies any complaints     ROS: All other systems are negative.    Vital Signs:    T(F): 98.9 (05-25-21 @ 13:14), Max: 98.9 (05-25-21 @ 13:14)  HR: 83 (05-25-21 @ 13:14) (71 - 83)  BP: 141/69 (05-25-21 @ 13:14) (133/76 - 169/83)  RR: 18 (05-25-21 @ 13:14) (18 - 19)  SpO2: 98% (05-25-21 @ 05:30) (96% - 99%)    PHYSICAL EXAM:    GENERAL:  NAD  SKIN: No rashes or lesions  HEENT: Atraumatic. Normocephalic.   NECK: Supple, No JVD. No lymphadenopathy.  PULMONARY: CTA B/L. No wheezing. No rales  CVS: Normal S1, S2. Rate and Rhythm are regular.   ABDOMEN/GI: Soft, Nontender, Nondistended; BS present  MSK:  No edema B/L LE. No clubbing or cyanosis  NEUROLOGIC:  No motor or sensory deficit.  PSYCH: Alert & oriented x 3, normal affect    Consultant(s) Notes Reviewed:  [x ] YES  [ ] NO  Care Discussed with Consultants/Other Providers [ x] YES  [ ] NO    LABS:                        12.9   7.79  )-----------( 224      ( 24 May 2021 09:49 )             39.3     140  |  104  |  10  ----------------------------<  124<H>  4.2   |  24  |  0.9    Ca    9.3      24 May 2021 09:49  Mg     1.9     05-24    TPro  7.1  /  Alb  4.3  /  TBili  0.5  /  DBili  x   /  AST  24  /  ALT  24  /  AlkPhos  117<H>  05-24    PT/INR - ( 24 May 2021 10:40 )   PT: 12.80 sec;   INR: 1.11 ratio       PTT - ( 24 May 2021 10:40 )  PTT:32.2 sec  Serum Pro-Brain Natriuretic Peptide: 22 pg/mL (05-24-21 @ 09:49)    Trop <0.01, CKMB --, CK --, 05-24-21 @ 09:49    RADIOLOGY & ADDITIONAL TESTS:  Imaging or report Personally Reviewed:  [x] YES  [ ] NO  EKG reviewed: [x] YES  [ ] NO    Medications:  Standing  amLODIPine   Tablet 10 milliGRAM(s) Oral daily  atorvastatin 20 milliGRAM(s) Oral at bedtime  chlorhexidine 4% Liquid 1 Application(s) Topical <User Schedule>  lisinopril 10 milliGRAM(s) Oral daily    PRN Meds            
PRADEEP GARCIA 76y Male  MRN#: [id number]   CODE STATUS:________      SUBJECTIVE  Patient is a 76y old Male who presents with a chief complaint of R sided Rib/chest pain (25 May 2021 14:31)  Currently admitted to medicine with the primary diagnosis of Pulmonary embolism      Today is hospital day 1d.  No acute event overnight.          OBJECTIVE  PAST MEDICAL & SURGICAL HISTORY  HTN (hypertension)    No significant past surgical history      ALLERGIES:  aspirin (Nausea)    MEDICATIONS:  STANDING MEDICATIONS  amLODIPine   Tablet 10 milliGRAM(s) Oral daily  atorvastatin 20 milliGRAM(s) Oral at bedtime  chlorhexidine 4% Liquid 1 Application(s) Topical <User Schedule>  enoxaparin Injectable 90 milliGRAM(s) SubCutaneous two times a day  lisinopril 10 milliGRAM(s) Oral daily    PRN MEDICATIONS      VITAL SIGNS: Last 24 Hours  T(C): 37.2 (25 May 2021 13:14), Max: 37.2 (25 May 2021 13:14)  T(F): 98.9 (25 May 2021 13:14), Max: 98.9 (25 May 2021 13:14)  HR: 83 (25 May 2021 13:14) (71 - 83)  BP: 141/69 (25 May 2021 13:14) (141/69 - 169/83)  BP(mean): --  RR: 18 (25 May 2021 13:14) (18 - 18)  SpO2: 98% (25 May 2021 05:30) (98% - 99%)    LABS:                        12.9   7.79  )-----------( 224      ( 24 May 2021 09:49 )             39.3     05-24    140  |  104  |  10  ----------------------------<  124<H>  4.2   |  24  |  0.9    Ca    9.3      24 May 2021 09:49  Mg     1.9     05-24    TPro  7.1  /  Alb  4.3  /  TBili  0.5  /  DBili  x   /  AST  24  /  ALT  24  /  AlkPhos  117<H>  05-24    PT/INR - ( 24 May 2021 10:40 )   PT: 12.80 sec;   INR: 1.11 ratio         PTT - ( 24 May 2021 10:40 )  PTT:32.2 sec          CARDIAC MARKERS ( 24 May 2021 09:49 )  x     / <0.01 ng/mL / x     / x     / x          RADIOLOGY:  < from: VA Duplex Lower Ext Vein Scan, Bilat (05.24.21 @ 19:31) >  Impression:    Acute deep vein thrombus in the left common femoral vein.  No evidence of acute deep vein thrombus or superficial thrombosis of the right lower extremity.    < end of copied text >  < from: CT Angio Chest PE Protocol w/ IV Cont (05.24.21 @ 15:26) >  IMPRESSION:      1.  Extensive bilateral pulmonary emboli. Emboli in the right interlobar artery with extension into middle and lower lobar arteries and numerous segmental branches. Emboli in the left upper and lower lobar arteries with extension into multiple segmental branches. No definitive CT evidence of right heart strain.  2.  Patchy predominantly peripheralright lower lobe opacities, may be infectious. Developing infarct not excluded.    < end of copied text >  < from: Xray Chest 2 Views PA/Lat (05.24.21 @ 12:31) >  Impression:    No radiographic evidence of acute cardiopulmonary disease.    < end of copied text >      PHYSICAL EXAM:    GENERAL: NAD, well-developed, AAOx3  HEENT:  Atraumatic, Normocephalic. EOMI, PERRLA, conjunctiva and sclera clear, No JVD  PULMONARY: Clear to auscultation bilaterally; No wheeze  CARDIOVASCULAR: Regular rate and rhythm; No murmurs, rubs, or gallops  GASTROINTESTINAL: Soft, Nontender, Nondistended; Bowel sounds present  MUSCULOSKELETAL:  2+ Peripheral Pulses, No clubbing, cyanosis, or edema  NEUROLOGY: non-focal  SKIN: No rashes or lesions

## 2021-05-26 NOTE — CHART NOTE - NSCHARTNOTEFT_GEN_A_CORE
Patient seen today, hemodynamically stable, still complaining of some pleuritic chest pain. No acute events overnight noted.   Patient underwent echo today.  Summary:   1. LV Ejection Fraction by Coronado's Method with a biplane EF of 66 %.   2. Moderate concentric left ventricular hypertrophy.   3. Spectral Doppler shows impaired relaxation pattern of left ventricular myocardial filling (Grade I diastolic dysfunction).   4. Mildly enlarged left atrium.   5. Mildly enlarged right atrium.   6. Trace mitral valve regurgitation.   7. Mild-moderate tricuspid regurgitation.   8. Mild pulmonic valve regurgitation.   9. Estimated pulmonary artery systolic pressure is 43.2 mmHg assuming a right atrial pressure of 3 mmHg, which is consistent with mild pulmonary hypertension.    Patient was safe for discharge home with pain medication and will start Eliquis dosing tonight.

## 2021-05-26 NOTE — DISCHARGE NOTE NURSING/CASE MANAGEMENT/SOCIAL WORK - PATIENT PORTAL LINK FT
You can access the FollowMyHealth Patient Portal offered by [facility] by registering at the following website: [email or web address]. By joining [employer]’s FollowMyHealth portal, you will also be able to view your health information using other applications (apps) compatible with our system.

## 2021-06-03 ENCOUNTER — APPOINTMENT (OUTPATIENT)
Dept: OTOLARYNGOLOGY | Facility: CLINIC | Age: 76
End: 2021-06-03
Payer: MEDICAID

## 2021-06-03 PROCEDURE — 92557 COMPREHENSIVE HEARING TEST: CPT

## 2021-06-03 PROCEDURE — 99204 OFFICE O/P NEW MOD 45 MIN: CPT | Mod: 25

## 2021-06-03 PROCEDURE — 92550 TYMPANOMETRY & REFLEX THRESH: CPT

## 2021-06-03 NOTE — HISTORY OF PRESENT ILLNESS
[Hearing Loss] : hearing loss [Ear Fullness] : ear fullness [None] : No associated symptoms are reported. [de-identified] : Patient presents today c/o clogged ears for 5 years .  Left ear is clogged can't hear well from it . Mostly hears from right ear . Denies any ear pain.  Has a history of ear discharge from left ear when younger.  [Tinnitus] : no tinnitus [Vertigo] : no vertigo

## 2021-06-04 ENCOUNTER — RESULT REVIEW (OUTPATIENT)
Age: 76
End: 2021-06-04

## 2021-06-07 ENCOUNTER — LABORATORY RESULT (OUTPATIENT)
Age: 76
End: 2021-06-07

## 2021-06-08 DIAGNOSIS — I82.412 ACUTE EMBOLISM AND THROMBOSIS OF LEFT FEMORAL VEIN: ICD-10-CM

## 2021-06-08 DIAGNOSIS — Z87.19 PERSONAL HISTORY OF OTHER DISEASES OF THE DIGESTIVE SYSTEM: ICD-10-CM

## 2021-06-08 DIAGNOSIS — I26.94 MULTIPLE SUBSEGMENTAL PULMONARY EMBOLI WITHOUT ACUTE COR PULMONALE: ICD-10-CM

## 2021-06-08 DIAGNOSIS — R07.9 CHEST PAIN, UNSPECIFIED: ICD-10-CM

## 2021-06-08 DIAGNOSIS — Z92.22 PERSONAL HISTORY OF MONOCLONAL DRUG THERAPY: ICD-10-CM

## 2021-06-08 DIAGNOSIS — I08.1 RHEUMATIC DISORDERS OF BOTH MITRAL AND TRICUSPID VALVES: ICD-10-CM

## 2021-06-08 DIAGNOSIS — I10 ESSENTIAL (PRIMARY) HYPERTENSION: ICD-10-CM

## 2021-06-08 DIAGNOSIS — I27.20 PULMONARY HYPERTENSION, UNSPECIFIED: ICD-10-CM

## 2021-06-22 ENCOUNTER — OUTPATIENT (OUTPATIENT)
Dept: OUTPATIENT SERVICES | Facility: HOSPITAL | Age: 76
LOS: 1 days | Discharge: HOME | End: 2021-06-22
Payer: MEDICAID

## 2021-06-22 DIAGNOSIS — H90.72 MIXED CONDUCTIVE AND SENSORINEURAL HEARING LOSS, UNILATERAL, LEFT EAR, WITH UNRESTRICTED HEARING ON THE CONTRALATERAL SIDE: ICD-10-CM

## 2021-06-22 PROCEDURE — 70480 CT ORBIT/EAR/FOSSA W/O DYE: CPT | Mod: 26

## 2021-06-23 ENCOUNTER — OUTPATIENT (OUTPATIENT)
Dept: OUTPATIENT SERVICES | Facility: HOSPITAL | Age: 76
LOS: 1 days | Discharge: HOME | End: 2021-06-23

## 2021-06-23 ENCOUNTER — APPOINTMENT (OUTPATIENT)
Dept: GERIATRICS | Facility: CLINIC | Age: 76
End: 2021-06-23
Payer: MEDICAID

## 2021-06-23 VITALS
OXYGEN SATURATION: 98 % | HEIGHT: 68 IN | WEIGHT: 205 LBS | DIASTOLIC BLOOD PRESSURE: 69 MMHG | SYSTOLIC BLOOD PRESSURE: 125 MMHG | TEMPERATURE: 98 F | HEART RATE: 75 BPM | BODY MASS INDEX: 31.07 KG/M2

## 2021-06-23 PROCEDURE — 99214 OFFICE O/P EST MOD 30 MIN: CPT | Mod: GC

## 2021-06-23 RX ORDER — PANTOPRAZOLE 40 MG/1
40 TABLET, DELAYED RELEASE ORAL DAILY
Qty: 90 | Refills: 3 | Status: DISCONTINUED | COMMUNITY
Start: 2020-05-20 | End: 2021-06-23

## 2021-06-24 NOTE — HISTORY OF PRESENT ILLNESS
[de-identified] : 75 yo M with a PMH of HTN, glaucoma, HLD, vitamin D deficiency, presenting for routine follow up for blood work. He reports over the last several months, he has been having bad dreams that occasionally wake him up, but he is not anxious or depressed during the day. He reports occasionally missing his medications, but otherwise no issues. \par   [No falls in past year] : Patient reported no falls in the past year [Fully functional (bathing, dressing, toileting, transferring, walking, feeding)] : Fully functional (bathing, dressing, toileting, transferring, walking, feeding) [Fully functional (using the telephone, shopping, preparing meals, housekeeping, doing laundry, using transportation,] : Fully functional and needs no help or supervision to perform IADLs (using the telephone, shopping, preparing meals, housekeeping, doing laundry, using transportation, managing medications and managing finances) [Patient/Caregiver unclear of wishes] : Patient/Caregiver unclear of wishes [FreeTextEntry1] : 76 year-old male with a PMH of HTN, glaucoma, HLD, vitamin D deficiency, presenting for follow up. The patient reports feeling well , however he was in the hospital early June 2021 with PE and DVT. The patient notes that he is asymptomatic now. He denies any age appropriate cancer screening in the past. He has no family history of hypercoagulation disorders. He was recently in [place] and traveled by car for 7 hours with minimal ambulation. Denies any blood in stool. Had 10 lbs weight loss as eating less because appetite not well. The patient also reports he is taking meclizine for vertigo. The patient denies all other ros.  [facility]] : 8 [0] : 2) Feeling down, depressed, or hopeless: Not at all [AdvancecareDate] : 06/23/21

## 2021-06-24 NOTE — CURRENT MEDS
[Patient/caregiver able to verbalize understanding of medications, including indications and side effects] : Patient/caregiver able to verbalize understanding of medications, including indications and side effects [Medication Reconciliation Completed] : Medication reconciliation completed [Yes] : Reviewed medication list for presence of high-risk medications.

## 2021-06-24 NOTE — PHYSICAL EXAM
[General Appearance - Alert] : alert [General Appearance - In No Acute Distress] : in no acute distress [General Appearance - Well Nourished] : well nourished [Sclera] : the sclera and conjunctiva were normal [Neck Appearance] : the appearance of the neck was normal [Apical Impulse] : the apical impulse was normal [Heart Sounds] : normal S1 and S2 [Bowel Sounds] : normal bowel sounds [Abdomen Soft] : soft [Abdomen Tenderness] : non-tender [Musculoskeletal - Swelling] : no joint swelling seen [] : no respiratory distress [Respiration, Rhythm And Depth] : normal respiratory rhythm and effort [Auscultation Breath Sounds / Voice Sounds] : lungs were clear to auscultation bilaterally [Version 6] : Word list version 6 - [place], [place], [place] [Oriented To Time, Place, And Person] : oriented to person, place, and time [Impaired Insight] : insight and judgment were intact [Affect] : the affect was normal

## 2021-06-24 NOTE — HISTORY OF PRESENT ILLNESS
[de-identified] : 75 yo M with a PMH of HTN, glaucoma, HLD, vitamin D deficiency, presenting for routine follow up for blood work. He reports over the last several months, he has been having bad dreams that occasionally wake him up, but he is not anxious or depressed during the day. He reports occasionally missing his medications, but otherwise no issues. \par   [No falls in past year] : Patient reported no falls in the past year [Fully functional (bathing, dressing, toileting, transferring, walking, feeding)] : Fully functional (bathing, dressing, toileting, transferring, walking, feeding) [Fully functional (using the telephone, shopping, preparing meals, housekeeping, doing laundry, using transportation,] : Fully functional and needs no help or supervision to perform IADLs (using the telephone, shopping, preparing meals, housekeeping, doing laundry, using transportation, managing medications and managing finances) [Patient/Caregiver unclear of wishes] : Patient/Caregiver unclear of wishes [FreeTextEntry1] : 76 year-old male with a PMH of HTN, glaucoma, HLD, vitamin D deficiency, presenting for follow up. The patient reports feeling well , however he was in the hospital early June 2021 with PE and DVT. The patient notes that he is asymptomatic now. He denies any age appropriate cancer screening in the past. He has no family history of hypercoagulation disorders. He was recently in [place] and traveled by car for 7 hours with minimal ambulation. Denies any blood in stool. Had 10 lbs weight loss as eating less because appetite not well. The patient also reports he is taking meclizine for vertigo. The patient denies all other ros.  [facility]] : 8 [0] : 2) Feeling down, depressed, or hopeless: Not at all [AdvancecareDate] : 06/23/21

## 2021-06-24 NOTE — ASSESSMENT
[FreeTextEntry1] : 76 year-old male with a PMH of HTN, glaucoma, HLD, vitamin D deficiency, presenting for follow up.\par \par # New PE and LE DVT unclear etiology ? post Covid? malignancy? \par - eliquis 5 mg bid, refills given\par - vascular follow up\par - age appropriate cancer screening , will give ref to GI \par \par # HTN  controlled\par C/w lisinopril 10 mg once daily\par \par # Chronic constipation\par - had daily bowel movement \par - C/w Colace 2 tabs once daily\par \par # Glaucoma\par C/w timolol and latanoprost eye drops.\par \par # Vertigo\par - meclizine 25 mg tid\par \par # New onset DM\par - HbA1c 6.5  (from 6.1 last time)\par - will start metformin 500 ER QD, patient is functional, aggressive management\par \par \par # HCM\par Pneumococcal vaccine given April /2021\par Moderna COVID vaccine was given\par f/u in   [Follow up with Opthtamologist] : follow up regularly with an ophthalmologist to optimize vision [Medication Review] : reviewed medications that increase the risk of falls [Medication Management] : medication management

## 2021-06-25 ENCOUNTER — OUTPATIENT (OUTPATIENT)
Dept: OUTPATIENT SERVICES | Facility: HOSPITAL | Age: 76
LOS: 1 days | Discharge: HOME | End: 2021-06-25
Payer: MEDICAID

## 2021-06-25 ENCOUNTER — RESULT REVIEW (OUTPATIENT)
Age: 76
End: 2021-06-25

## 2021-06-25 DIAGNOSIS — H90.72 MIXED CONDUCTIVE AND SENSORINEURAL HEARING LOSS, UNILATERAL, LEFT EAR, WITH UNRESTRICTED HEARING ON THE CONTRALATERAL SIDE: ICD-10-CM

## 2021-06-25 DIAGNOSIS — I10 ESSENTIAL (PRIMARY) HYPERTENSION: ICD-10-CM

## 2021-06-25 DIAGNOSIS — I82.409 ACUTE EMBOLISM AND THROMBOSIS OF UNSPECIFIED DEEP VEINS OF UNSPECIFIED LOWER EXTREMITY: ICD-10-CM

## 2021-06-25 DIAGNOSIS — I26.99 OTHER PULMONARY EMBOLISM WITHOUT ACUTE COR PULMONALE: ICD-10-CM

## 2021-06-25 PROCEDURE — 70553 MRI BRAIN STEM W/O & W/DYE: CPT | Mod: 26,76

## 2021-07-07 ENCOUNTER — APPOINTMENT (OUTPATIENT)
Dept: VASCULAR SURGERY | Facility: CLINIC | Age: 76
End: 2021-07-07
Payer: MEDICAID

## 2021-07-07 VITALS
BODY MASS INDEX: 31.07 KG/M2 | WEIGHT: 205 LBS | SYSTOLIC BLOOD PRESSURE: 145 MMHG | HEART RATE: 71 BPM | TEMPERATURE: 98 F | HEIGHT: 68 IN | DIASTOLIC BLOOD PRESSURE: 74 MMHG

## 2021-07-07 PROCEDURE — 93971 EXTREMITY STUDY: CPT

## 2021-07-07 PROCEDURE — 99214 OFFICE O/P EST MOD 30 MIN: CPT

## 2021-07-07 NOTE — ASSESSMENT
[FreeTextEntry1] : The patient is a 76-year-old gentleman with him provoked left leg common femoral DVT. The patient is been treated so far with 2 months of anticoagulation. Today he presents for a followup evaluation. From my standpoint I performed a venous duplex there is no evidence of acute left common femoral vein DVT. I recommend the patient continue with the 3-6 month course of its coagulation. I would like him to see hematology for a hypercoagulable workup as this DVT was him provoked. I will see him back in my office in October or sooner if any new symptoms develop. He's to remain on anticoagulation until then

## 2021-07-07 NOTE — HISTORY OF PRESENT ILLNESS
[FreeTextEntry1] : The patient is a 76-year-old gentleman with acute left leg DVT. The patient had presented to the hospital with acute left leg pain and swelling on May 25. The patient was diagnosed with left femoral vein DVT.He was started on Eliquis.

## 2021-07-08 ENCOUNTER — APPOINTMENT (OUTPATIENT)
Dept: OTOLARYNGOLOGY | Facility: CLINIC | Age: 76
End: 2021-07-08
Payer: MEDICAID

## 2021-07-08 PROCEDURE — 99213 OFFICE O/P EST LOW 20 MIN: CPT | Mod: 25

## 2021-07-08 PROCEDURE — 92557 COMPREHENSIVE HEARING TEST: CPT

## 2021-07-08 PROCEDURE — 92550 TYMPANOMETRY & REFLEX THRESH: CPT

## 2021-07-08 NOTE — HISTORY OF PRESENT ILLNESS
[FreeTextEntry1] : Patient presents today following up on hearing loss. Patient sent for CT and Brain MRI, both studies unremarkable as reviwed by me . Pt has  continued hearing loss.

## 2021-07-08 NOTE — DATA REVIEWED
[de-identified] : EXAM:  CT ORBITS\par \par \par PROCEDURE DATE:  06/22/2021\par \par \par \par \par INTERPRETATION:  Clinical History / Reason for exam: Left-sided mixed hearing loss\par \par Technique: CT of the temporal bones without contrast.  Contiguous unenhanced CT axial images of the temporal bones without intravenous contrast, with targeted axial, coronal and sagittal reformats of each side.\par \par Correlation with MRI brain 3/9/2021, CT head 3/8/2021\par \par Findings:\par \par Right side:  The external auditory canal appears normal.  The middle ear cavity is clear.  The ossicles are intact without evidence of erosion.  The inner ear structures appear morphologically unremarkable. The right mastoid air cells are well developed and well aerated.  The vascular structures and course and caliber of the facial nerve appear normal.\par \par Left side:  The external auditory canal appears normal.  The middle ear cavity is clear.  The ossicles are intact without evidence of erosion.  The inner ear structures appear morphologically unremarkable. The left mastoid air cells are well developed and well aerated.  The vascular structures and course and caliber of the facial nerve appear normal.\par \par There are degenerative changes of the temporomandibular joints with small calcified joint bodies bilaterally.\par \par IMPRESSION:\par \par Unremarkable CT of the temporal bones.\par \par \par \par \par JAMES MENJIVAR MD; Attending Radiologist\par This document has been electronically signed. Jun 23 2021 12:14PM\par \par  EXAM:  MR IAC ONLY WAW IC\par EXAM:  MR BRAIN WAW IC\par \par \par PROCEDURE DATE:  06/25/2021\par \par \par \par \par INTERPRETATION:  Clinical History / Reason for exam: Mixed hearing loss of left ear.\par \par TECHNIQUE: MRI brain and internal auditory canals with and without contrast. An Angiocath was placed. Multiplanar multisequential MRI of the brain and internal auditory canals was performed before and following the intravenous administration of 9.5 cc Gadavist (0.5 cc discarded) on the 1.5 Luz Maria magnet.\par \par COMPARISON: Brain MRI 3/9/2021\par \par FINDINGS:\par \par Brain-\par The ventricles and cortical sulci are normal in size and configuration.\par \par There is patchy foci of T2/FLAIR signal hyperintensity within the cerebral white matter consistent with chronic microvascular change.\par \par There are chronic lacunar infarcts within the left frontal white matter, the right basal ganglia and the left aspect of the keyonna stable since the prior exam.\par \par There is no evidence of acute intracranial hemorrhage, extra-axial fluid collection or midline shift.   There is no diffusion abnormality to suggest acute/subacute infarct. There is normal flow void present within the major vascular structures.\par \par There is no evidence for pathologic intracranial enhancement.\par \par IACs-\par There is no evidence of abnormal signal or enhancement within the internal auditory canals or cerebellopontine angle cisterns. There is normal CSF signal within the inner ear structures.\par \par There is trace fluid within the left mastoid complex.\par \par IMPRESSION:\par \par 1.  MRI brain: Mild chronic microvascular changes and scattered chronic lacunar infarcts, stable since the previous brain MRI 3/9/2021.\par \par 2.  MRI IACs: Unremarkable study.\par \par \par \par \par \par JAMES MENJIVAR MD; Attending Radiologist\par This document has been electronically signed. Jun 28 2021 12:12PM\par \par  \par \par

## 2021-07-09 ENCOUNTER — OUTPATIENT (OUTPATIENT)
Dept: OUTPATIENT SERVICES | Facility: HOSPITAL | Age: 76
LOS: 1 days | Discharge: HOME | End: 2021-07-09

## 2021-07-09 ENCOUNTER — APPOINTMENT (OUTPATIENT)
Dept: GASTROENTEROLOGY | Facility: CLINIC | Age: 76
End: 2021-07-09
Payer: MEDICAID

## 2021-07-09 VITALS
TEMPERATURE: 97.9 F | HEIGHT: 68 IN | DIASTOLIC BLOOD PRESSURE: 82 MMHG | OXYGEN SATURATION: 95 % | HEART RATE: 81 BPM | BODY MASS INDEX: 31.07 KG/M2 | WEIGHT: 205 LBS | SYSTOLIC BLOOD PRESSURE: 142 MMHG

## 2021-07-09 DIAGNOSIS — R10.13 EPIGASTRIC PAIN: ICD-10-CM

## 2021-07-09 DIAGNOSIS — K21.9 GASTRO-ESOPHAGEAL REFLUX DISEASE WITHOUT ESOPHAGITIS: ICD-10-CM

## 2021-07-09 DIAGNOSIS — D12.6 BENIGN NEOPLASM OF COLON, UNSPECIFIED: ICD-10-CM

## 2021-07-09 PROCEDURE — 99214 OFFICE O/P EST MOD 30 MIN: CPT | Mod: GC

## 2021-07-09 NOTE — ASSESSMENT
[FreeTextEntry1] : 76 year-old male with a PMH of HTN, glaucoma, HLD, vitamin D deficiency, recent onset DVT and PE in June currently on eliquis BID presented here for follow up of colonoscopy and c/o epigastric abdominal pain. \par \par #)3 tubular adenoma in last colonoscopy 2018\par needs another colonoscopy but he is on eliquis for DVT/PE\par timing of next colonoscopy depends on vascular follow up and use of eliquis\par will schedule colonoscopy after he finishes his eliquis course\par \par #)Epigastric abdominal pain\par -No alarm signs and symptoms except his age\par -will check stool antigen for H.Pylori, if negative will start on PPI OD;if he still has pain will do EGD sooner. \par -Follow up in 2 months\par \par #)GERD\par head end of bed elevation\par last meal 2-3 hours before going to bed\par Avoid fatty foods, caffeine, chocolate, spicy foods, food with high fat content, carbonated beverages, and peppermint\par Avoid smoking and alcohol\par \par \par \par \par \par \par

## 2021-07-09 NOTE — END OF VISIT
[FreeTextEntry3] : CF 2018 3 TAs, repeat due this year, can perform once pt is off eliquis. abd pain - no alarm symptoms except age, check HP, start PPI, if pain persists can perform EGD

## 2021-07-09 NOTE — HISTORY OF PRESENT ILLNESS
[Heartburn] : stable heartburn [Nausea] : denies nausea [Vomiting] : denies vomiting [Diarrhea] : denies diarrhea [Constipation] : denies constipation [Abdominal Pain] : abdominal pain [de-identified] : 76 year-old male with a PMH of HTN, glaucoma, HLD, vitamin D deficiency, recent onset DVT and PE in June currently on eliquis BID presented here for follow up of colonoscopy. \par He complaints of abdominal pain 1-2 times/month lasts for 30 minutes 4-5/10 in intensity non radiating, burning pain associated with reflux, no nausea, vomiting. Denies any patient melena, hematochezia, hematemesis, weight loss, dysphagia, odynophagia, family h/o GI cancers. \par \par Last colonoscopy in 2018: Good prep showed 1 polyp in sigmoid colon hyperplastic 5mm, 3 polyps in transverse colon tubular adenoma 5mm.

## 2021-07-09 NOTE — PHYSICAL EXAM
[General Appearance - Alert] : alert [General Appearance - In No Acute Distress] : in no acute distress [Sclera] : the sclera and conjunctiva were normal [Hearing Threshold Finger Rub Not [place]] : hearing was normal [] : no respiratory distress [Exaggerated Use Of Accessory Muscles For Inspiration] : no accessory muscle use [Auscultation Breath Sounds / Voice Sounds] : lungs were clear to auscultation bilaterally [Heart Sounds] : normal S1 and S2 [Edema] : there was no peripheral edema [Bowel Sounds] : normal bowel sounds [Abdomen Soft] : soft [Abdomen Tenderness] : non-tender [Abdomen Mass (___ Cm)] : no abdominal mass palpated [No Focal Deficits] : no focal deficits [Oriented To Time, Place, And Person] : oriented to person, place, and time

## 2021-07-21 ENCOUNTER — OUTPATIENT (OUTPATIENT)
Dept: OUTPATIENT SERVICES | Facility: HOSPITAL | Age: 76
LOS: 1 days | Discharge: HOME | End: 2021-07-21

## 2021-07-22 DIAGNOSIS — H90.8 MIXED CONDUCTIVE AND SENSORINEURAL HEARING LOSS, UNSPECIFIED: ICD-10-CM

## 2021-07-23 ENCOUNTER — OUTPATIENT (OUTPATIENT)
Dept: OUTPATIENT SERVICES | Facility: HOSPITAL | Age: 76
LOS: 1 days | Discharge: HOME | End: 2021-07-23

## 2021-07-23 ENCOUNTER — NON-APPOINTMENT (OUTPATIENT)
Age: 76
End: 2021-07-23

## 2021-07-23 ENCOUNTER — APPOINTMENT (OUTPATIENT)
Dept: GASTROENTEROLOGY | Facility: CLINIC | Age: 76
End: 2021-07-23
Payer: MEDICAID

## 2021-07-23 PROCEDURE — 99214 OFFICE O/P EST MOD 30 MIN: CPT | Mod: GC

## 2021-07-23 NOTE — HISTORY OF PRESENT ILLNESS
[Heartburn] : stable heartburn [Nausea] : denies nausea [Vomiting] : denies vomiting [Diarrhea] : denies diarrhea [Yellow Skin Or Eyes (Jaundice)] : denies jaundice [Constipation] : stable constipation [Abdominal Pain] : stable abdominal pain [Abdominal Swelling] : denies abdominal swelling [Rectal Pain] : denies rectal pain [GERD] : gastroesophageal reflux disease [Hiatus Hernia] : no hiatus hernia [Peptic Ulcer Disease] : no peptic ulcer disease [Pancreatitis] : no pancreatitis [Cholelithiasis] : no cholelithiasis [Kidney Stone] : no kidney stone [Inflammatory Bowel Disease] : no inflammatory bowel disease [Irritable Bowel Syndrome] : no irritable bowel syndrome [Malignancy] : no malignancy [Abdominal Surgery] : no abdominal surgery [Appendectomy] : no appendectomy [de-identified] : Mr Martinez is a 76 year-old male with a PMH of HTN, glaucoma, HLD, vitamin D deficiency, history of 3 tubular adenomas in 2018, and recent left femoral vein DVT and PE in June status post treatment with eliquis 5mg BID (until 07/10 when he was asked to stop for planning a repeat colonoscopy) presenting here for follow up on plan for repeat colonoscopy.\par \par Last colonoscopy in 2018: Good prep showed 1 polyp in sigmoid colon hyperplastic 5mm, 3 polyps in transverse colon tubular adenoma 5mm. \par \par He still reports episodes of lower quadrant abdominal pain that is 4-5/10 in intensity, non radiating, burning in nature, that occurs 1-2 times/week and lasts for 60 minutes. \par Episodes are associated with some reflux.\par He otherwise denies any nausea, vomiting, diarrhea or constipation (has a bowel movement 3x/week). \par He also denies melena, hematochezia, hematemesis, weight loss, dysphagia, odynophagia.\par No Family h/o GI cancers. \par \par  [FreeTextEntry1] : Mr Martinez is a 76 year-old male with a PMH of HTN, glaucoma, HLD, vitamin D deficiency, history of 3 tubular adenomas in 2018, and recent left femoral vein DVT and PE in June status post treatment with eliquis 5mg BID (until 07/10 when he was asked to stop for planning a repeat colonoscopy) presenting here for follow up on plan for repeat colonoscopy.\par \par Last colonoscopy in 2018: Good prep showed 1 polyp in sigmoid colon hyperplastic 5mm, 3 polyps in transverse colon tubular adenoma 5mm. \par \par He still reports episodes of lower quadrant abdominal pain that is 4-5/10 in intensity, non radiating, burning in nature, that occurs 1-2 times/week and lasts for 60 minutes. \par Episodes are associated with some reflux.\par He otherwise denies any nausea, vomiting, diarrhea or constipation (has a bowel movement 3x/week). \par He also denies melena, hematochezia, hematemesis, weight loss, dysphagia, odynophagia.\par No Family h/o GI cancers. \par \par

## 2021-07-23 NOTE — END OF VISIT
[] : Resident [FreeTextEntry3] : pt here for surveillance colonoscopy but has had recent DVT - spoke to Dr. Frye; as pt will not be able to get colonoscopy until september, will have him cont eliquis for now until he's been on it for 3 months, then stop for 3 days prior to procedure. pt also having dyspepsia, age > 50. schedule EGD with colonoscopy. f/u HP testing

## 2021-07-23 NOTE — ASSESSMENT
[FreeTextEntry1] : Case of a 76 year-old male with a PMH of HTN, glaucoma, HLD, vitamin D deficiency, recent onset DVT and PE in June currently on eliquis BID presented here for follow up on plan for repeat colonoscopy and epigastric abdominal pain. \par \par \par History of 3 tubular adenomas in 2018\par * Last colonoscopy in 2018: Good prep showed 1 polyp in sigmoid colon hyperplastic 5mm, 3 polyps in transverse colon tubular adenoma 5mm. \par * No family history of colorectal cancer\par * Off eliquis 5mg BID for DVT since 07/10 (patient followed up with Dr Frye Vascular Surgeon a week after 07/09 GI clinic appointment)\par \par - Will schedule colonoscopy for follow up in setting of tubular adenomas x3 in 2018 on 09/02/2021\par - Contacted Dr Frye over phone on 07/23/21: since patient has been on eliquis since May and almost completed 3 months of AC, we can proceed. Since earliest appointment is September 2, will ask patient to continue eliquis until 3 days before 09/02/21 \par - Will ask patient to drink the preparation on the day prior to the procedure 09/01\par \par \par \par GERD\par Epigastric abdominal pain\par * Currently off medications\par * No alarm signs and symptoms except his age\par \par - Will perform EGD along with colonoscopy on 09/02/21\par - Will follow up stool antigen for H.Pylori (patient provided sample today)\par - Avoid fatty foods, caffeine, chocolate, spicy foods, food with high fat content, carbonated beverages, and peppermint\par - Avoid smoking and alcohol\par - Keep off protonix for now

## 2021-07-23 NOTE — REVIEW OF SYSTEMS
[Abdominal Pain] : abdominal pain [Constipation] : constipation [Heartburn] : heartburn [Fever] : no fever [Chills] : no chills [Feeling Poorly] : not feeling poorly [Feeling Tired] : not feeling tired [Eye Pain] : no eye pain [Earache] : no earache [Shortness Of Breath] : no shortness of breath [Wheezing] : no wheezing [Cough] : no cough [SOB on Exertion] : no shortness of breath during exertion [Orthopnea] : no orthopnea [Vomiting] : no vomiting [Diarrhea] : no diarrhea [Melena] : no melena [Dysuria] : no dysuria [Incontinence] : no incontinence [Hesitancy] : no urinary hesitancy [Arthralgias] : no arthralgias [Joint Pain] : no joint pain [Joint Swelling] : no joint swelling [Joint Stiffness] : no joint stiffness [Skin Lesions] : no skin lesions [Confused] : no confusion [Dizziness] : no dizziness [Limb Weakness] : no limb weakness

## 2021-07-23 NOTE — PHYSICAL EXAM
[General Appearance - Alert] : alert [General Appearance - In No Acute Distress] : in no acute distress [General Appearance - Well Nourished] : well nourished [General Appearance - Well Developed] : well developed [General Appearance - Well-Appearing] : healthy appearing [Sclera] : the sclera and conjunctiva were normal [Extraocular Movements] : extraocular movements were intact [Outer Ear] : the ears and nose were normal in appearance [Neck Appearance] : the appearance of the neck was normal [Apical Impulse] : the apical impulse was normal [Heart Rate And Rhythm] : heart rate was normal and rhythm regular [Heart Sounds] : normal S1 and S2 [Heart Sounds Gallop] : no gallops [Murmurs] : no murmurs [Heart Sounds Pericardial Friction Rub] : no pericardial rub [Bowel Sounds] : normal bowel sounds [Abdomen Soft] : soft [Abdomen Tenderness] : non-tender [] : no hepato-splenomegaly [Abdomen Mass (___ Cm)] : no abdominal mass palpated [Abdomen Hernia] : no hernia was discovered [No CVA Tenderness] : no ~M costovertebral angle tenderness [Abnormal Walk] : normal gait [Skin Color & Pigmentation] : normal skin color and pigmentation

## 2021-07-28 ENCOUNTER — OUTPATIENT (OUTPATIENT)
Dept: OUTPATIENT SERVICES | Facility: HOSPITAL | Age: 76
LOS: 1 days | Discharge: HOME | End: 2021-07-28

## 2021-07-28 DIAGNOSIS — H90.3 SENSORINEURAL HEARING LOSS, BILATERAL: ICD-10-CM

## 2021-08-11 ENCOUNTER — OUTPATIENT (OUTPATIENT)
Dept: OUTPATIENT SERVICES | Facility: HOSPITAL | Age: 76
LOS: 1 days | Discharge: HOME | End: 2021-08-11

## 2021-08-11 DIAGNOSIS — H90.3 SENSORINEURAL HEARING LOSS, BILATERAL: ICD-10-CM

## 2021-09-07 ENCOUNTER — APPOINTMENT (OUTPATIENT)
Dept: OPHTHALMOLOGY | Facility: CLINIC | Age: 76
End: 2021-09-07

## 2021-09-15 ENCOUNTER — OUTPATIENT (OUTPATIENT)
Dept: OUTPATIENT SERVICES | Facility: HOSPITAL | Age: 76
LOS: 1 days | Discharge: HOME | End: 2021-09-15

## 2021-09-15 DIAGNOSIS — H90.3 SENSORINEURAL HEARING LOSS, BILATERAL: ICD-10-CM

## 2021-09-17 ENCOUNTER — NON-APPOINTMENT (OUTPATIENT)
Age: 76
End: 2021-09-17

## 2021-09-17 ENCOUNTER — OUTPATIENT (OUTPATIENT)
Dept: OUTPATIENT SERVICES | Facility: HOSPITAL | Age: 76
LOS: 1 days | Discharge: HOME | End: 2021-09-17

## 2021-09-17 ENCOUNTER — APPOINTMENT (OUTPATIENT)
Dept: GASTROENTEROLOGY | Facility: CLINIC | Age: 76
End: 2021-09-17
Payer: MEDICAID

## 2021-09-17 VITALS
WEIGHT: 213 LBS | BODY MASS INDEX: 32.28 KG/M2 | OXYGEN SATURATION: 99 % | DIASTOLIC BLOOD PRESSURE: 70 MMHG | TEMPERATURE: 96.2 F | RESPIRATION RATE: 18 BRPM | SYSTOLIC BLOOD PRESSURE: 120 MMHG | HEIGHT: 68 IN | HEART RATE: 76 BPM

## 2021-09-17 PROCEDURE — 99214 OFFICE O/P EST MOD 30 MIN: CPT | Mod: GC

## 2021-09-17 NOTE — END OF VISIT
[] : Fellow [FreeTextEntry3] : due for surveillance colonoscopy, will schedule. per Dr. Frye can stop eliquis 3 days prior to procedure. alk phos elevation - obtain GGT

## 2021-09-17 NOTE — ASSESSMENT
[FreeTextEntry1] : 76 year-old male with a PMH of HTN, glaucoma, HLD, vitamin D deficiency, history of 3 subcentimter tubular adenomas in 2018, and recent left femoral vein DVT and PE on eliquis comes here surveillance colonoscopy. Patient was scheduled for EGD (abdominal pain) and colonoscopy but didn’t get COVID testing so procedure was cancelled even though he took the prep.\par \par \par \par Surveillance colonoscopy for three subcentimeter TA in 2018\par ASA class 3\par No blood thinners.\par CBC, CMP adequate\par \par Rec:\par Contacted Dr Frye over phone on 07/23/21: since patient has been on eliquis since May and almost completed 3 months of AC, we can proceed. Since earliest appointment is September 2, will ask patient to continue eliquis until 3 days before 09/02/21 \par - Golytely and dulcolax given.\par \par \par Constipation: Resolving\par start miralax daily.\par \par Isolated ALP and other liver enzymes are normal\par Get GGT, no previous imaging.\par \par \par \par \par \par

## 2021-09-17 NOTE — HISTORY OF PRESENT ILLNESS
[Heartburn] : denies heartburn [Nausea] : denies nausea [Vomiting] : denies vomiting [Diarrhea] : denies diarrhea [Constipation] : denies constipation [Yellow Skin Or Eyes (Jaundice)] : denies jaundice [Abdominal Pain] : denies abdominal pain [Abdominal Swelling] : denies abdominal swelling [Rectal Pain] : denies rectal pain [de-identified] : 76 year-old male with a PMH of HTN, glaucoma, HLD, vitamin D deficiency, history of 3 subcentimter tubular adenomas in 2018, and recent left femoral vein DVT and PE on eliquis comes here surveillance colonoscopy. Patient was scheduled for EGD (abdominal pain) and colonoscopy but didn’t get COVID testing so procedure was cancelled even though he took the prep.\par After taking the prep he feels much better as his constipation/abdominal pain is resolved. he denies GERD, blood in stools, FH of GI malignancy, weight loss.\par \par Last colonoscopy in 2018: Good prep showed 1 polyp in sigmoid colon hyperplastic 5mm, 3 \par He also denies melena, hematochezia, hematemesis, weight loss, dysphagia, odynophagia.\par No Family h/o GI cancers.

## 2021-09-17 NOTE — REVIEW OF SYSTEMS
[Fever] : no fever [Chills] : no chills [Eye Pain] : no eye pain [Red Eyes] : eyes not red [Nosebleeds] : no nosebleeds [Nasal Discharge] : no nasal discharge [Chest Pain] : no chest pain [Palpitations] : no palpitations [Abdominal Pain] : no abdominal pain [Vomiting] : no vomiting [Constipation] : no constipation [Diarrhea] : no diarrhea [Heartburn] : no heartburn [Melena] : no melena [Dizziness] : no dizziness [Fainting] : no fainting

## 2021-09-17 NOTE — PHYSICAL EXAM
[General Appearance - Alert] : alert [General Appearance - In No Acute Distress] : in no acute distress [Sclera] : the sclera and conjunctiva were normal [Outer Ear] : the ears and nose were normal in appearance [Hearing Threshold Finger Rub Not [place]] : hearing was normal [Neck Appearance] : the appearance of the neck was normal [Neck Cervical Mass (___cm)] : no neck mass was observed [Exaggerated Use Of Accessory Muscles For Inspiration] : no accessory muscle use [Abdomen Soft] : soft [Abdomen Tenderness] : non-tender [No CVA Tenderness] : no ~M costovertebral angle tenderness [No Spinal Tenderness] : no spinal tenderness [Abnormal Walk] : normal gait [Nail Clubbing] : no clubbing  or cyanosis of the fingernails [Skin Color & Pigmentation] : normal skin color and pigmentation [] : no rash [Oriented To Time, Place, And Person] : oriented to person, place, and time

## 2021-09-20 LAB
ALBUMIN SERPL ELPH-MCNC: 4.5 G/DL
ALP BLD-CCNC: 106 U/L
ALT SERPL-CCNC: 33 U/L
ANION GAP SERPL CALC-SCNC: 15 MMOL/L
AST SERPL-CCNC: 26 U/L
BILIRUB SERPL-MCNC: 0.4 MG/DL
BUN SERPL-MCNC: 10 MG/DL
CALCIUM SERPL-MCNC: 9.3 MG/DL
CHLORIDE SERPL-SCNC: 99 MMOL/L
CO2 SERPL-SCNC: 23 MMOL/L
CREAT SERPL-MCNC: 0.9 MG/DL
GGT SERPL-CCNC: 38 U/L
GLUCOSE SERPL-MCNC: 158 MG/DL
POTASSIUM SERPL-SCNC: 3.8 MMOL/L
PROT SERPL-MCNC: 6.9 G/DL
SODIUM SERPL-SCNC: 137 MMOL/L

## 2021-11-09 ENCOUNTER — OUTPATIENT (OUTPATIENT)
Dept: OUTPATIENT SERVICES | Facility: HOSPITAL | Age: 76
LOS: 1 days | Discharge: HOME | End: 2021-11-09

## 2021-11-09 ENCOUNTER — LABORATORY RESULT (OUTPATIENT)
Age: 76
End: 2021-11-09

## 2021-11-09 DIAGNOSIS — Z11.59 ENCOUNTER FOR SCREENING FOR OTHER VIRAL DISEASES: ICD-10-CM

## 2021-11-12 ENCOUNTER — OUTPATIENT (OUTPATIENT)
Dept: OUTPATIENT SERVICES | Facility: HOSPITAL | Age: 76
LOS: 1 days | Discharge: HOME | End: 2021-11-12
Payer: MEDICAID

## 2021-11-12 ENCOUNTER — TRANSCRIPTION ENCOUNTER (OUTPATIENT)
Age: 76
End: 2021-11-12

## 2021-11-12 ENCOUNTER — RESULT REVIEW (OUTPATIENT)
Age: 76
End: 2021-11-12

## 2021-11-12 VITALS
SYSTOLIC BLOOD PRESSURE: 153 MMHG | RESPIRATION RATE: 19 BRPM | TEMPERATURE: 97 F | WEIGHT: 210.1 LBS | DIASTOLIC BLOOD PRESSURE: 91 MMHG | HEIGHT: 69 IN | HEART RATE: 80 BPM

## 2021-11-12 VITALS
RESPIRATION RATE: 18 BRPM | SYSTOLIC BLOOD PRESSURE: 186 MMHG | OXYGEN SATURATION: 97 % | DIASTOLIC BLOOD PRESSURE: 85 MMHG | HEART RATE: 72 BPM

## 2021-11-12 PROCEDURE — 45380 COLONOSCOPY AND BIOPSY: CPT | Mod: XU

## 2021-11-12 PROCEDURE — 45385 COLONOSCOPY W/LESION REMOVAL: CPT

## 2021-11-12 PROCEDURE — 88305 TISSUE EXAM BY PATHOLOGIST: CPT | Mod: 26

## 2021-11-12 RX ORDER — ATORVASTATIN CALCIUM 80 MG/1
1 TABLET, FILM COATED ORAL
Qty: 0 | Refills: 0 | DISCHARGE

## 2021-11-12 RX ORDER — LISINOPRIL 2.5 MG/1
1 TABLET ORAL
Qty: 0 | Refills: 0 | DISCHARGE

## 2021-11-12 RX ORDER — AMLODIPINE BESYLATE 2.5 MG/1
1 TABLET ORAL
Qty: 0 | Refills: 0 | DISCHARGE

## 2021-11-12 NOTE — CHART NOTE - NSCHARTNOTEFT_GEN_A_CORE
PACU ANESTHESIA ADMISSION NOTE      Procedure:   Post op diagnosis:      ____  Intubated  TV:______       Rate: ______      FiO2: ______    _x___  Patent Airway    _x___  Full return of protective reflexes    ____  Full recovery from anesthesia / back to baseline     Vitals:   T:    37       R:    20              BP:  116/61                Sat:  100                 P: 71      Mental Status:  __x__ Awake   _____ Alert   _____ Drowsy   _____ Sedated    Nausea/Vomiting:  __x__ NO  ______Yes,   See Post - Op Orders          Pain Scale (0-10):  __0___    Treatment: _x___ None    ____ See Post - Op/PCA Orders    Post - Operative Fluids:   ___x_ Oral   ____ See Post - Op Orders    Plan: Discharge:   __x__Home       _____Floor     _____Critical Care    _____  Other:_________________    Comments:

## 2021-11-12 NOTE — ASU PATIENT PROFILE, ADULT - NSICDXPASTMEDICALHX_GEN_ALL_CORE_FT
PAST MEDICAL HISTORY:  Glaucoma     HLD (hyperlipidemia)     HTN (hypertension)     Pulmonary embolism     Vitamin D deficiency

## 2021-11-12 NOTE — PRE-ANESTHESIA EVALUATION ADULT - NSANTHOSAYNRD_GEN_A_CORE
No. BRAEDEN screening performed.  STOP BANG Legend: 0-2 = LOW Risk; 3-4 = INTERMEDIATE Risk; 5-8 = HIGH Risk

## 2021-11-16 LAB — SURGICAL PATHOLOGY STUDY: SIGNIFICANT CHANGE UP

## 2021-11-22 DIAGNOSIS — D12.2 BENIGN NEOPLASM OF ASCENDING COLON: ICD-10-CM

## 2021-11-22 DIAGNOSIS — I10 ESSENTIAL (PRIMARY) HYPERTENSION: ICD-10-CM

## 2021-11-22 DIAGNOSIS — Z12.11 ENCOUNTER FOR SCREENING FOR MALIGNANT NEOPLASM OF COLON: ICD-10-CM

## 2021-11-22 DIAGNOSIS — Z79.01 LONG TERM (CURRENT) USE OF ANTICOAGULANTS: ICD-10-CM

## 2021-11-22 DIAGNOSIS — Z86.010 PERSONAL HISTORY OF COLONIC POLYPS: ICD-10-CM

## 2021-11-22 DIAGNOSIS — H40.9 UNSPECIFIED GLAUCOMA: ICD-10-CM

## 2021-11-22 DIAGNOSIS — Z86.711 PERSONAL HISTORY OF PULMONARY EMBOLISM: ICD-10-CM

## 2021-11-22 DIAGNOSIS — K57.30 DIVERTICULOSIS OF LARGE INTESTINE WITHOUT PERFORATION OR ABSCESS WITHOUT BLEEDING: ICD-10-CM

## 2021-11-22 DIAGNOSIS — E78.5 HYPERLIPIDEMIA, UNSPECIFIED: ICD-10-CM

## 2021-11-22 DIAGNOSIS — E55.9 VITAMIN D DEFICIENCY, UNSPECIFIED: ICD-10-CM

## 2021-11-22 DIAGNOSIS — I82.402 ACUTE EMBOLISM AND THROMBOSIS OF UNSPECIFIED DEEP VEINS OF LEFT LOWER EXTREMITY: ICD-10-CM

## 2021-11-22 DIAGNOSIS — K51.40 INFLAMMATORY POLYPS OF COLON WITHOUT COMPLICATIONS: ICD-10-CM

## 2021-11-23 PROBLEM — H40.9 UNSPECIFIED GLAUCOMA: Chronic | Status: ACTIVE | Noted: 2021-11-12

## 2021-11-23 PROBLEM — E78.5 HYPERLIPIDEMIA, UNSPECIFIED: Chronic | Status: ACTIVE | Noted: 2021-11-12

## 2021-11-23 PROBLEM — I26.99 OTHER PULMONARY EMBOLISM WITHOUT ACUTE COR PULMONALE: Chronic | Status: ACTIVE | Noted: 2021-11-12

## 2021-11-23 PROBLEM — E55.9 VITAMIN D DEFICIENCY, UNSPECIFIED: Chronic | Status: ACTIVE | Noted: 2021-11-12

## 2021-11-30 ENCOUNTER — APPOINTMENT (OUTPATIENT)
Dept: OPHTHALMOLOGY | Facility: CLINIC | Age: 76
End: 2021-11-30

## 2021-12-08 ENCOUNTER — LABORATORY RESULT (OUTPATIENT)
Age: 76
End: 2021-12-08

## 2021-12-08 ENCOUNTER — APPOINTMENT (OUTPATIENT)
Dept: GERIATRICS | Facility: CLINIC | Age: 76
End: 2021-12-08
Payer: MEDICAID

## 2021-12-08 ENCOUNTER — OUTPATIENT (OUTPATIENT)
Dept: OUTPATIENT SERVICES | Facility: HOSPITAL | Age: 76
LOS: 1 days | Discharge: HOME | End: 2021-12-08

## 2021-12-08 VITALS
TEMPERATURE: 97.2 F | DIASTOLIC BLOOD PRESSURE: 74 MMHG | HEIGHT: 68 IN | SYSTOLIC BLOOD PRESSURE: 166 MMHG | OXYGEN SATURATION: 95 % | WEIGHT: 213 LBS | HEART RATE: 69 BPM | BODY MASS INDEX: 32.28 KG/M2

## 2021-12-08 VITALS — DIASTOLIC BLOOD PRESSURE: 90 MMHG | SYSTOLIC BLOOD PRESSURE: 145 MMHG

## 2021-12-08 PROCEDURE — 99214 OFFICE O/P EST MOD 30 MIN: CPT | Mod: GC

## 2021-12-08 NOTE — PHYSICAL EXAM
[Alert] : alert [Well Nourished] : well nourished [No Acute Distress] : in no acute distress [No Respiratory Distress] : no respiratory distress [No Acc Muscle Use] : no accessory muscle use [Respiration, Rhythm And Depth] : normal respiratory rhythm and effort [Auscultation Breath Sounds / Voice Sounds] : lungs were clear to auscultation bilaterally [Normal S1, S2] : normal S1 and S2 [Heart Rate And Rhythm] : heart rate was normal and rhythm regular [Bowel Sounds] : normal bowel sounds [Abdomen Tenderness] : non-tender [Abdomen Soft] : soft

## 2021-12-13 DIAGNOSIS — G47.9 SLEEP DISORDER, UNSPECIFIED: ICD-10-CM

## 2021-12-13 DIAGNOSIS — I10 ESSENTIAL (PRIMARY) HYPERTENSION: ICD-10-CM

## 2021-12-13 DIAGNOSIS — I26.99 OTHER PULMONARY EMBOLISM WITHOUT ACUTE COR PULMONALE: ICD-10-CM

## 2021-12-13 DIAGNOSIS — K21.9 GASTRO-ESOPHAGEAL REFLUX DISEASE WITHOUT ESOPHAGITIS: ICD-10-CM

## 2021-12-13 DIAGNOSIS — N40.0 BENIGN PROSTATIC HYPERPLASIA WITHOUT LOWER URINARY TRACT SYMPTOMS: ICD-10-CM

## 2021-12-13 DIAGNOSIS — Z00.00 ENCOUNTER FOR GENERAL ADULT MEDICAL EXAMINATION WITHOUT ABNORMAL FINDINGS: ICD-10-CM

## 2021-12-13 DIAGNOSIS — J81.0 ACUTE PULMONARY EDEMA: ICD-10-CM

## 2021-12-13 DIAGNOSIS — E78.5 HYPERLIPIDEMIA, UNSPECIFIED: ICD-10-CM

## 2021-12-17 NOTE — ASSESSMENT
[FreeTextEntry1] : 76 year-old male with a PMH of HTN, glaucoma, HLD, vitamin D deficiency, presenting for follow up.\par \par # New PE and LE DVT unclear etiology ? post Covid? malignancy? \par - eliquis 5 mg bid, refills given\par - vascular follow up (requested for Oct), pt reminded to make appointment \par - hematology and pulm referral given\par \par #nightmares\par - disturbance in sleep, possible sleep apnea\par - pulm referral for PFTs\par - start mirtazapine 7.5mg at night \par \par # HTN controlled\par C/w lisinopril 10 mg once daily\par \par # Chronic constipation\par - had daily bowel movement \par - C/w Colace 2 tabs once daily\par \par # Glaucoma\par C/w timolol and latanoprost eye drops.\par \par # Vertigo\par - meclizine 25 mg tid\par \par # New onset DM\par - HbA1c 6.5 (from 6.1 last time)\par - metformin 500mg BID \par \par #HLD \par - continue with atorvastatin \par \par \par # HCM\par - Pneumococcal vaccine given April /2021\par - Moderna COVID vaccinated, booster gotten 4 days prior\par - Colonoscopy 11/2021: 3 polyps found, repeat in 3 years \par - Flu shot today \par - routine blood work\par - RTC in 3 months

## 2021-12-17 NOTE — REVIEW OF SYSTEMS
[Fever] : no fever [Chest Pain] : no chest pain [Shortness Of Breath] : no shortness of breath [Wheezing] : no wheezing [Cough] : no cough [Abdominal Pain] : no abdominal pain [Vomiting] : no vomiting [Heartburn] : no heartburn [Dysuria] : no dysuria [Joint Swelling] : no joint swelling [Joint Stiffness] : no joint stiffness

## 2021-12-17 NOTE — HISTORY OF PRESENT ILLNESS
[FreeTextEntry1] : 76 year-old male with a PMH of HTN, glaucoma, HLD, vitamin D deficiency, DVT/PE presenting for follow up. He has no acute complaints today. He reports having nightmares every night, he doesn't sleep well at night. He able to do his daily activities himself.

## 2022-01-03 LAB
HBV SURFACE AB SER QL: NONREACTIVE
HBV SURFACE AG SER QL: NONREACTIVE
MEV IGG FLD QL IA: >300 AU/ML
MEV IGG+IGM SER-IMP: POSITIVE
MUV AB SER-ACNC: POSITIVE
MUV IGG SER QL IA: 58.6 AU/ML
RUBV IGG FLD-ACNC: 15.5 INDEX
RUBV IGG SER-IMP: POSITIVE
VZV AB TITR SER: POSITIVE
VZV IGG SER IF-ACNC: 814.3 INDEX

## 2022-01-04 ENCOUNTER — EMERGENCY (EMERGENCY)
Facility: HOSPITAL | Age: 77
LOS: 0 days | Discharge: HOME | End: 2022-01-04
Attending: EMERGENCY MEDICINE | Admitting: EMERGENCY MEDICINE
Payer: MEDICAID

## 2022-01-04 VITALS
DIASTOLIC BLOOD PRESSURE: 79 MMHG | OXYGEN SATURATION: 100 % | RESPIRATION RATE: 18 BRPM | TEMPERATURE: 98 F | HEART RATE: 70 BPM | SYSTOLIC BLOOD PRESSURE: 173 MMHG

## 2022-01-04 VITALS
RESPIRATION RATE: 17 BRPM | OXYGEN SATURATION: 99 % | TEMPERATURE: 97 F | DIASTOLIC BLOOD PRESSURE: 73 MMHG | SYSTOLIC BLOOD PRESSURE: 156 MMHG | HEART RATE: 89 BPM | WEIGHT: 212.97 LBS | HEIGHT: 69 IN

## 2022-01-04 DIAGNOSIS — Z88.8 ALLERGY STATUS TO OTHER DRUGS, MEDICAMENTS AND BIOLOGICAL SUBSTANCES: ICD-10-CM

## 2022-01-04 DIAGNOSIS — N40.1 BENIGN PROSTATIC HYPERPLASIA WITH LOWER URINARY TRACT SYMPTOMS: ICD-10-CM

## 2022-01-04 DIAGNOSIS — R35.0 FREQUENCY OF MICTURITION: ICD-10-CM

## 2022-01-04 DIAGNOSIS — R30.0 DYSURIA: ICD-10-CM

## 2022-01-04 DIAGNOSIS — N39.0 URINARY TRACT INFECTION, SITE NOT SPECIFIED: ICD-10-CM

## 2022-01-04 DIAGNOSIS — R39.12 POOR URINARY STREAM: ICD-10-CM

## 2022-01-04 DIAGNOSIS — E78.5 HYPERLIPIDEMIA, UNSPECIFIED: ICD-10-CM

## 2022-01-04 DIAGNOSIS — Z79.01 LONG TERM (CURRENT) USE OF ANTICOAGULANTS: ICD-10-CM

## 2022-01-04 DIAGNOSIS — I10 ESSENTIAL (PRIMARY) HYPERTENSION: ICD-10-CM

## 2022-01-04 LAB
APPEARANCE UR: ABNORMAL
BACTERIA # UR AUTO: ABNORMAL
BILIRUB UR-MCNC: NEGATIVE — SIGNIFICANT CHANGE UP
COLOR SPEC: YELLOW — SIGNIFICANT CHANGE UP
DIFF PNL FLD: SIGNIFICANT CHANGE UP
EPI CELLS # UR: 0 /HPF — SIGNIFICANT CHANGE UP (ref 0–5)
GLUCOSE UR QL: NEGATIVE — SIGNIFICANT CHANGE UP
HYALINE CASTS # UR AUTO: 7 /LPF — SIGNIFICANT CHANGE UP (ref 0–7)
KETONES UR-MCNC: NEGATIVE — SIGNIFICANT CHANGE UP
LEUKOCYTE ESTERASE UR-ACNC: ABNORMAL
NITRITE UR-MCNC: NEGATIVE — SIGNIFICANT CHANGE UP
PH UR: 7 — SIGNIFICANT CHANGE UP (ref 5–8)
PROT UR-MCNC: SIGNIFICANT CHANGE UP
RBC CASTS # UR COMP ASSIST: 5 /HPF — HIGH (ref 0–4)
SP GR SPEC: 1.02 — SIGNIFICANT CHANGE UP (ref 1.01–1.03)
UROBILINOGEN FLD QL: SIGNIFICANT CHANGE UP
WBC UR QL: 169 /HPF — HIGH (ref 0–5)

## 2022-01-04 PROCEDURE — 99283 EMERGENCY DEPT VISIT LOW MDM: CPT

## 2022-01-04 RX ORDER — CEFPODOXIME PROXETIL 100 MG
1 TABLET ORAL
Qty: 20 | Refills: 0
Start: 2022-01-04 | End: 2022-01-13

## 2022-01-04 RX ORDER — TAMSULOSIN HYDROCHLORIDE 0.4 MG/1
1 CAPSULE ORAL
Qty: 7 | Refills: 0
Start: 2022-01-04 | End: 2022-01-10

## 2022-01-04 NOTE — ED PROVIDER NOTE - PHYSICAL EXAMINATION
Vital Signs: I have reviewed the initial vital signs.  Constitutional: well-nourished, appears stated age, no acute distress.  HEENT: Airway patent, moist MM, no erythema/swelling/deformity of oral structures. EOMI, PERRLA.  CV: regular rate, regular rhythm, well-perfused extremities, 2+ b/l DP and radial pulses equal.  Lungs: BCTA, no increased WOB.  ABD: soft, NTND, no guarding or rebound, no pulsatile mass, no hernias, no flank pain.   : No lesions or ttp to penile glans, bilaterally descended testes, normal cremasteric reflex, nontender.   MSK: Neck supple, nontender, nl ROM, no stepoff. Chest nontender. Back nontender in TLS spine or to b/l bony structures. Ext nontender, nl rom, no deformity.   INTEG: Skin warm, dry, no rash.  NEURO: A&Ox3, moving all extremities, normal speech  PSYCH: Calm, cooperative, normal affect and interaction.

## 2022-01-04 NOTE — ED PROVIDER NOTE - CLINICAL SUMMARY MEDICAL DECISION MAKING FREE TEXT BOX
76-year-old man with history of hypertension PE on Eliquis hyperlipidemia presents with urinary difficulty for 1 week expresses increased urinary frequency dysuria and urgency.  Patient denies flank pain fever chills nausea vomiting or abdominal pain.  There is no penile discharge.  On examination patient appears well without evidence of severe sepsis or septic shock.  Work-up was okay in the ED other than urinary tract infection.  Potentially UTI cystitis versus prostatitis.  Oral antibiotics probiotics and close outpatient follow-up.  Patient to return for any new or worsening symptoms.

## 2022-01-04 NOTE — ED ADULT NURSE NOTE - OBJECTIVE STATEMENT
Pt presented to ED c/o  problems. Pt c/o increased urination and penile pain. Denies n/v/d/fevers/chills. Pt A&Ox4, ambulatory.

## 2022-01-04 NOTE — ED PROVIDER NOTE - PATIENT PORTAL LINK FT
You can access the FollowMyHealth Patient Portal offered by [facility] by registering at the following website: [email or web address]. By joining [email or web address]’s FollowMyHealth portal, you will also be able to view your health information using other applications (apps) compatible with our system.

## 2022-01-04 NOTE — ED ADULT NURSE NOTE - NSIMPLEMENTINTERV_GEN_ALL_ED
Implemented All Universal Safety Interventions:  [place] to call system. Call bell, personal items and telephone within reach. Instruct patient to call for assistance. Room bathroom lighting operational. Non-slip footwear when patient is off stretcher. Physically safe environment: no spills, clutter or unnecessary equipment. Stretcher in lowest position, wheels locked, appropriate side rails in place.

## 2022-01-04 NOTE — ED PROVIDER NOTE - NSFOLLOWUPCLINICS_GEN_ALL_ED_FT
[facility]  Urology  .  [place]   Phone: [phone]  Fax:   Follow Up Time: 4-6 Days

## 2022-01-04 NOTE — ED PROVIDER NOTE - ATTENDING CONTRIBUTION TO CARE
I personally evaluated the patient. I reviewed the Resident’s or Physician Assistant’s note (as assigned above), and agree with the findings and plan except as documented in my note.  76-year-old man with history of hypertension PE on Eliquis hyperlipidemia presents with urinary difficulty for 1 week expresses increased urinary frequency dysuria and urgency.  Patient denies flank pain fever chills nausea vomiting or abdominal pain.  There is no penile discharge.  On examination patient appears well without evidence of severe sepsis or septic shock.  Work-up was okay in the ED other than urinary tract infection.  Potentially UTI cystitis versus prostatitis.  Oral antibiotics probiotics and close outpatient follow-up.  Patient to return for any new or worsening symptoms.

## 2022-01-04 NOTE — ED PROVIDER NOTE - OBJECTIVE STATEMENT
76m hx of pe on eliquis, htn, hld presents with urinary difficulty x 1 week. 76m hx of pe on eliquis, htn, hld presents with urinary difficulty x 1 week, notes increased frequency and weak stream, with associated dysuria, denies lesions/warts/sexual activity/hematuria/flank pain/abd pain.

## 2022-01-05 ENCOUNTER — APPOINTMENT (OUTPATIENT)
Dept: VASCULAR SURGERY | Facility: CLINIC | Age: 77
End: 2022-01-05
Payer: MEDICAID

## 2022-01-06 LAB
-  AMIKACIN: SIGNIFICANT CHANGE UP
-  AMIKACIN: SIGNIFICANT CHANGE UP
-  AMOXICILLIN/CLAVULANIC ACID: SIGNIFICANT CHANGE UP
-  AMOXICILLIN/CLAVULANIC ACID: SIGNIFICANT CHANGE UP
-  AMPICILLIN/SULBACTAM: SIGNIFICANT CHANGE UP
-  AMPICILLIN: SIGNIFICANT CHANGE UP
-  AZTREONAM: SIGNIFICANT CHANGE UP
-  AZTREONAM: SIGNIFICANT CHANGE UP
-  CEFAZOLIN: SIGNIFICANT CHANGE UP
-  CEFEPIME: SIGNIFICANT CHANGE UP
-  CEFEPIME: SIGNIFICANT CHANGE UP
-  CEFOTAXIME: SIGNIFICANT CHANGE UP
-  CEFOTAXIME: SIGNIFICANT CHANGE UP
-  CEFOXITIN: SIGNIFICANT CHANGE UP
-  CEFOXITIN: SIGNIFICANT CHANGE UP
-  CEFTAZIDIME/AVIBACTAM: SIGNIFICANT CHANGE UP
-  CEFTAZIDIME/AVIBACTAM: SIGNIFICANT CHANGE UP
-  CEFTAZIDIME: SIGNIFICANT CHANGE UP
-  CEFTAZIDIME: SIGNIFICANT CHANGE UP
-  CEFTOLOZANE/TAZOBACTAM: SIGNIFICANT CHANGE UP
-  CEFTOLOZANE/TAZOBACTAM: SIGNIFICANT CHANGE UP
-  CEFTRIAXONE: SIGNIFICANT CHANGE UP
-  CEFUROXIME: SIGNIFICANT CHANGE UP
-  CEFUROXIME: SIGNIFICANT CHANGE UP
-  CIPROFLOXACIN: SIGNIFICANT CHANGE UP
-  CIPROFLOXACIN: SIGNIFICANT CHANGE UP
-  ERTAPENEM: SIGNIFICANT CHANGE UP
-  ERTAPENEM: SIGNIFICANT CHANGE UP
-  GENTAMICIN: SIGNIFICANT CHANGE UP
-  GENTAMICIN: SIGNIFICANT CHANGE UP
-  IMIPENEM: SIGNIFICANT CHANGE UP
-  IMIPENEM: SIGNIFICANT CHANGE UP
-  LEVOFLOXACIN: SIGNIFICANT CHANGE UP
-  LEVOFLOXACIN: SIGNIFICANT CHANGE UP
-  MEROPENEM: SIGNIFICANT CHANGE UP
-  MEROPENEM: SIGNIFICANT CHANGE UP
-  MINOCYCLINE: SIGNIFICANT CHANGE UP
-  MINOCYCLINE: SIGNIFICANT CHANGE UP
-  NITROFURANTOIN: SIGNIFICANT CHANGE UP
-  PIPERACILLIN/TAZOBACTAM: SIGNIFICANT CHANGE UP
-  PIPERACILLIN/TAZOBACTAM: SIGNIFICANT CHANGE UP
-  TETRACYCLINE: SIGNIFICANT CHANGE UP
-  TETRACYCLINE: SIGNIFICANT CHANGE UP
-  TIGECYCLINE: SIGNIFICANT CHANGE UP
-  TIGECYCLINE: SIGNIFICANT CHANGE UP
-  TOBRAMYCIN: SIGNIFICANT CHANGE UP
-  TOBRAMYCIN: SIGNIFICANT CHANGE UP
-  TRIMETHOPRIM/SULFAMETHOXAZOLE: SIGNIFICANT CHANGE UP
-  TRIMETHOPRIM/SULFAMETHOXAZOLE: SIGNIFICANT CHANGE UP
CULTURE RESULTS: SIGNIFICANT CHANGE UP
METHOD TYPE: SIGNIFICANT CHANGE UP
ORGANISM # SPEC MICROSCOPIC CNT: SIGNIFICANT CHANGE UP
ORGANISM # SPEC MICROSCOPIC CNT: SIGNIFICANT CHANGE UP
SPECIMEN SOURCE: SIGNIFICANT CHANGE UP

## 2022-01-19 ENCOUNTER — APPOINTMENT (OUTPATIENT)
Dept: VASCULAR SURGERY | Facility: CLINIC | Age: 77
End: 2022-01-19
Payer: MEDICAID

## 2022-01-19 VITALS
DIASTOLIC BLOOD PRESSURE: 72 MMHG | BODY MASS INDEX: 32.43 KG/M2 | WEIGHT: 214 LBS | SYSTOLIC BLOOD PRESSURE: 145 MMHG | HEIGHT: 68 IN

## 2022-01-19 PROCEDURE — 99214 OFFICE O/P EST MOD 30 MIN: CPT

## 2022-01-19 PROCEDURE — 93971 EXTREMITY STUDY: CPT

## 2022-01-20 ENCOUNTER — APPOINTMENT (OUTPATIENT)
Dept: UROLOGY | Facility: CLINIC | Age: 77
End: 2022-01-20

## 2022-01-22 ENCOUNTER — EMERGENCY (EMERGENCY)
Facility: HOSPITAL | Age: 77
LOS: 0 days | Discharge: HOME | End: 2022-01-22
Attending: EMERGENCY MEDICINE | Admitting: EMERGENCY MEDICINE
Payer: MEDICAID

## 2022-01-22 VITALS
OXYGEN SATURATION: 98 % | SYSTOLIC BLOOD PRESSURE: 169 MMHG | HEART RATE: 70 BPM | RESPIRATION RATE: 17 BRPM | DIASTOLIC BLOOD PRESSURE: 86 MMHG

## 2022-01-22 VITALS
TEMPERATURE: 98 F | HEIGHT: 69 IN | SYSTOLIC BLOOD PRESSURE: 195 MMHG | OXYGEN SATURATION: 99 % | DIASTOLIC BLOOD PRESSURE: 90 MMHG | HEART RATE: 74 BPM | RESPIRATION RATE: 18 BRPM

## 2022-01-22 DIAGNOSIS — Z88.8 ALLERGY STATUS TO OTHER DRUGS, MEDICAMENTS AND BIOLOGICAL SUBSTANCES: ICD-10-CM

## 2022-01-22 DIAGNOSIS — R35.0 FREQUENCY OF MICTURITION: ICD-10-CM

## 2022-01-22 DIAGNOSIS — E78.5 HYPERLIPIDEMIA, UNSPECIFIED: ICD-10-CM

## 2022-01-22 DIAGNOSIS — I10 ESSENTIAL (PRIMARY) HYPERTENSION: ICD-10-CM

## 2022-01-22 DIAGNOSIS — R30.0 DYSURIA: ICD-10-CM

## 2022-01-22 LAB
ALBUMIN SERPL ELPH-MCNC: 4.3 G/DL — SIGNIFICANT CHANGE UP (ref 3.5–5.2)
ALP SERPL-CCNC: 102 U/L — SIGNIFICANT CHANGE UP (ref 30–115)
ALT FLD-CCNC: 35 U/L — SIGNIFICANT CHANGE UP (ref 0–41)
ANION GAP SERPL CALC-SCNC: 14 MMOL/L — SIGNIFICANT CHANGE UP (ref 7–14)
APPEARANCE UR: CLEAR — SIGNIFICANT CHANGE UP
AST SERPL-CCNC: 48 U/L — HIGH (ref 0–41)
BACTERIA # UR AUTO: NEGATIVE — SIGNIFICANT CHANGE UP
BASOPHILS # BLD AUTO: 0.03 K/UL — SIGNIFICANT CHANGE UP (ref 0–0.2)
BASOPHILS NFR BLD AUTO: 0.5 % — SIGNIFICANT CHANGE UP (ref 0–1)
BILIRUB SERPL-MCNC: 0.4 MG/DL — SIGNIFICANT CHANGE UP (ref 0.2–1.2)
BILIRUB UR-MCNC: NEGATIVE — SIGNIFICANT CHANGE UP
BUN SERPL-MCNC: 14 MG/DL — SIGNIFICANT CHANGE UP (ref 10–20)
CALCIUM SERPL-MCNC: 9.3 MG/DL — SIGNIFICANT CHANGE UP (ref 8.5–10.1)
CHLORIDE SERPL-SCNC: 102 MMOL/L — SIGNIFICANT CHANGE UP (ref 98–110)
CO2 SERPL-SCNC: 23 MMOL/L — SIGNIFICANT CHANGE UP (ref 17–32)
COLOR SPEC: YELLOW — SIGNIFICANT CHANGE UP
CREAT SERPL-MCNC: 1 MG/DL — SIGNIFICANT CHANGE UP (ref 0.7–1.5)
DIFF PNL FLD: NEGATIVE — SIGNIFICANT CHANGE UP
EOSINOPHIL # BLD AUTO: 0.12 K/UL — SIGNIFICANT CHANGE UP (ref 0–0.7)
EOSINOPHIL NFR BLD AUTO: 2 % — SIGNIFICANT CHANGE UP (ref 0–8)
EPI CELLS # UR: 1 /HPF — SIGNIFICANT CHANGE UP (ref 0–5)
GLUCOSE SERPL-MCNC: 114 MG/DL — HIGH (ref 70–99)
GLUCOSE UR QL: NEGATIVE — SIGNIFICANT CHANGE UP
HCT VFR BLD CALC: 42.6 % — SIGNIFICANT CHANGE UP (ref 42–52)
HGB BLD-MCNC: 13.8 G/DL — LOW (ref 14–18)
HYALINE CASTS # UR AUTO: 0 /LPF — SIGNIFICANT CHANGE UP (ref 0–7)
IMM GRANULOCYTES NFR BLD AUTO: 0.2 % — SIGNIFICANT CHANGE UP (ref 0.1–0.3)
KETONES UR-MCNC: NEGATIVE — SIGNIFICANT CHANGE UP
LEUKOCYTE ESTERASE UR-ACNC: NEGATIVE — SIGNIFICANT CHANGE UP
LYMPHOCYTES # BLD AUTO: 1.99 K/UL — SIGNIFICANT CHANGE UP (ref 1.2–3.4)
LYMPHOCYTES # BLD AUTO: 33 % — SIGNIFICANT CHANGE UP (ref 20.5–51.1)
MCHC RBC-ENTMCNC: 25.7 PG — LOW (ref 27–31)
MCHC RBC-ENTMCNC: 32.4 G/DL — SIGNIFICANT CHANGE UP (ref 32–37)
MCV RBC AUTO: 79.3 FL — LOW (ref 80–94)
MONOCYTES # BLD AUTO: 0.54 K/UL — SIGNIFICANT CHANGE UP (ref 0.1–0.6)
MONOCYTES NFR BLD AUTO: 9 % — SIGNIFICANT CHANGE UP (ref 1.7–9.3)
NEUTROPHILS # BLD AUTO: 3.34 K/UL — SIGNIFICANT CHANGE UP (ref 1.4–6.5)
NEUTROPHILS NFR BLD AUTO: 55.3 % — SIGNIFICANT CHANGE UP (ref 42.2–75.2)
NITRITE UR-MCNC: NEGATIVE — SIGNIFICANT CHANGE UP
NRBC # BLD: 0 /100 WBCS — SIGNIFICANT CHANGE UP (ref 0–0)
PH UR: 7.5 — SIGNIFICANT CHANGE UP (ref 5–8)
PLATELET # BLD AUTO: 214 K/UL — SIGNIFICANT CHANGE UP (ref 130–400)
POTASSIUM SERPL-MCNC: 5.2 MMOL/L — HIGH (ref 3.5–5)
POTASSIUM SERPL-SCNC: 5.2 MMOL/L — HIGH (ref 3.5–5)
PROT SERPL-MCNC: 6.9 G/DL — SIGNIFICANT CHANGE UP (ref 6–8)
PROT UR-MCNC: ABNORMAL
RBC # BLD: 5.37 M/UL — SIGNIFICANT CHANGE UP (ref 4.7–6.1)
RBC # FLD: 13.5 % — SIGNIFICANT CHANGE UP (ref 11.5–14.5)
RBC CASTS # UR COMP ASSIST: 1 /HPF — SIGNIFICANT CHANGE UP (ref 0–4)
SODIUM SERPL-SCNC: 139 MMOL/L — SIGNIFICANT CHANGE UP (ref 135–146)
SP GR SPEC: 1.02 — SIGNIFICANT CHANGE UP (ref 1.01–1.03)
UROBILINOGEN FLD QL: SIGNIFICANT CHANGE UP
WBC # BLD: 6.03 K/UL — SIGNIFICANT CHANGE UP (ref 4.8–10.8)
WBC # FLD AUTO: 6.03 K/UL — SIGNIFICANT CHANGE UP (ref 4.8–10.8)
WBC UR QL: 1 /HPF — SIGNIFICANT CHANGE UP (ref 0–5)

## 2022-01-22 PROCEDURE — 99285 EMERGENCY DEPT VISIT HI MDM: CPT

## 2022-01-22 PROCEDURE — 74177 CT ABD & PELVIS W/CONTRAST: CPT | Mod: 26,MA

## 2022-01-22 NOTE — ED ADULT NURSE NOTE - OBJECTIVE STATEMENT
77 y/o male pt came c/o burning on urination and lower abdominal pain on and off, pt stated that he was treated for STD with abx and completed 7 days of treatment but still has symptoms. Pt denies n/v/d, denies chest pain, dizziness, abdominal pain and denies hematuria.

## 2022-01-22 NOTE — ED PROVIDER NOTE - PHYSICAL EXAMINATION
GENERAL: Well-nourished, Well-developed. NAD.  HEAD: No visible or palpable bumps or hematomas. No ecchymosis behind ears B/L.  Eyes: PERRLA, EOMI. No asymmetry. No nystagmus. No conjunctival injection. Non-icteric sclera.  ENMT: MMM.   Neck: Supple. FROM  CVS: RRR. Normal S1,S2. No murmurs appreciated on auscultation   RESP: No use of accessory muscles. Chest rise symmetrical with good expansion. Lungs clear to auscultation B/L. No wheezing, rales, or rhonchi auscultated.  GI: Normal auscultation of bowel sounds in all 4 quadrants. Soft, Nontender, Nondistended. No guarding or rebound tenderness. No CVAT B/L.  Skin: Warm, Dry. No rashes or lesions. Good cap refill < 2 sec B/L.  Neuro: AA&O x 3. Sensation grossly intact. Strength 5/5 B/L. Gait within normal limits.

## 2022-01-22 NOTE — ED ADULT NURSE NOTE - CARDIO ASSESSMENT
Pt presents with anxious mood and depressed affect, hyper verbal. Pt completes ADLs independently and denies SI and HI. Pt ate well and attended group with active participation, required redirection at times due to being hyper verbal and tangential. Pt was social with peers and staff, spent most of the morning in the milieu. Pt discussed wanting to pay attention to how the medications are making her feel \"the only real reason I am here is because I need my meds to work\". Pt also discussed her support from AA and how she was able to call her support system and try to utilize them before she came to the hospital.    ---

## 2022-01-22 NOTE — ED PROVIDER NOTE - ATTENDING CONTRIBUTION TO CARE
77 yo M pmh of pe on eliquis, htn, hld presents with urinary symptoms. Patient reports that 2 weeks ago was diagnosed with a UTI that improved with cefpodoxime. State that he was asymptomatic. Then reports having intercourse with his wife a few days ago and feels like his UTI returned. States that he is having some burning sensation. no frequency, no dysuria. no fevers. no abdominal pain. no flank or back pain. no penile discharge or lesions. Only sexually active with his wife.     CONSTITUTIONAL: Well-developed; well-nourished; in no acute distress.   SKIN: warm, dry  HEAD: Normocephalic; atraumatic.  EYES: PERRL, EOMI, no conjunctival erythema  ENT: No nasal discharge; airway clear.  NECK: Supple; non tender.  CARD: S1, S2 normal;  Regular rate and rhythm.   RESP: No wheezes, rales or rhonchi.  ABD: soft non tender, non distended, no rebound or guarding, no cva tenderness.   EXT: Normal ROM.  5/5 strength in all 4 extremities   LYMPH: No acute cervical adenopathy.  NEURO: Alert, oriented, grossly unremarkable. neurovascularly intact  PSYCH: Cooperative, appropriate.
Attending Attestation (For Attendings USE Only)...

## 2022-01-22 NOTE — ED PROVIDER NOTE - CLINICAL SUMMARY MEDICAL DECISION MAKING FREE TEXT BOX
Patient presents with urinary symptoms. recently treated for UTI. labs, ua, ct done. no acute findings. Discharged with pmd and urology follow up. Return precautions discussed.

## 2022-01-22 NOTE — ED PROVIDER NOTE - CARE PROVIDER_API CALL
Rashaad Dumont)  Urology  [address] [place] [address]  Phone: [phone]  Fax: [phone]  Follow Up Time: 1-3 Days

## 2022-01-22 NOTE — ED PROVIDER NOTE - NS ED ROS FT
Constitutional: (-) fever (-) malaise (-) diaphoresis (-) chills   Cardiac: (-) chest pain  (-) palpitations (-) syncope (-) edema  Respiratory: (-) cough (-) SOB (-) PEDERSEN  GI: (-) nausea (-) vomiting (-) diarrhea (-) abdominal pain   : (+) dysuria (+) increased frequency  (-) hematuria (-) incontinence  Neuro: (-) headache (-) dizziness (-) numbness/tingling to extremities B/L (-) weakness  Skin: (-) rash (-) laceration    Except as documented in the HPI, all other systems are negative.

## 2022-01-22 NOTE — ED PROVIDER NOTE - PATIENT PORTAL LINK FT
You can access the FollowMyHealth Patient Portal offered by [facility] by registering at the following website: [email or web address]. By joining [employer]’s FollowMyHealth portal, you will also be able to view your health information using other applications (apps) compatible with our system.

## 2022-01-22 NOTE — ED PROVIDER NOTE - OBJECTIVE STATEMENT
75 yo M pmhx PE on eliquis, htn, hld presenting to the ED for evaluation of dysuria and increased frequency. Pt reports a few weeks ago he was seen in the ED dx with uti and treated with cefpodoxime, pt completed course and symptoms resolved, states for the past few days symptoms returned. Pt endorses after having sexual intercourse with his wife his symptoms developed, pt only sexually active with his wife. Denies any fever, chills, nausea, vomiting, penile lesions/discharge, abd pain, flank pain, hematuria.

## 2022-01-22 NOTE — ED ADULT TRIAGE NOTE - CHIEF COMPLAINT QUOTE
"I have a sexual transmitted infection, I don't know what medication Im on. I think I still have the infection."

## 2022-01-23 LAB
CULTURE RESULTS: SIGNIFICANT CHANGE UP
SPECIMEN SOURCE: SIGNIFICANT CHANGE UP

## 2022-01-24 LAB
C TRACH RRNA SPEC QL NAA+PROBE: SIGNIFICANT CHANGE UP
N GONORRHOEA RRNA SPEC QL NAA+PROBE: SIGNIFICANT CHANGE UP
SPECIMEN SOURCE: SIGNIFICANT CHANGE UP

## 2022-02-16 ENCOUNTER — RX RENEWAL (OUTPATIENT)
Age: 77
End: 2022-02-16

## 2022-02-23 ENCOUNTER — APPOINTMENT (OUTPATIENT)
Dept: GERIATRICS | Facility: CLINIC | Age: 77
End: 2022-02-23
Payer: MEDICAID

## 2022-02-23 ENCOUNTER — NON-APPOINTMENT (OUTPATIENT)
Age: 77
End: 2022-02-23

## 2022-02-23 ENCOUNTER — OUTPATIENT (OUTPATIENT)
Dept: OUTPATIENT SERVICES | Facility: HOSPITAL | Age: 77
LOS: 1 days | Discharge: HOME | End: 2022-02-23

## 2022-02-23 VITALS
WEIGHT: 201 LBS | SYSTOLIC BLOOD PRESSURE: 199 MMHG | TEMPERATURE: 98.9 F | BODY MASS INDEX: 30.46 KG/M2 | DIASTOLIC BLOOD PRESSURE: 79 MMHG | HEART RATE: 84 BPM | OXYGEN SATURATION: 94 % | HEIGHT: 68 IN

## 2022-02-23 VITALS
HEIGHT: 68 IN | SYSTOLIC BLOOD PRESSURE: 126 MMHG | WEIGHT: 201 LBS | DIASTOLIC BLOOD PRESSURE: 78 MMHG | TEMPERATURE: 97.9 F | HEART RATE: 74 BPM | BODY MASS INDEX: 30.46 KG/M2 | OXYGEN SATURATION: 99 %

## 2022-02-23 PROCEDURE — 99214 OFFICE O/P EST MOD 30 MIN: CPT | Mod: GC

## 2022-02-23 RX ORDER — MIRTAZAPINE 7.5 MG/1
7.5 TABLET, FILM COATED ORAL
Qty: 30 | Refills: 2 | Status: COMPLETED | COMMUNITY
Start: 2021-12-08 | End: 2022-02-23

## 2022-02-23 RX ORDER — POLYETHYLENE GLYCOL 3350 17 G/17G
17 POWDER, FOR SOLUTION ORAL
Qty: 1 | Refills: 0 | Status: DISCONTINUED | COMMUNITY
Start: 2021-07-23 | End: 2022-02-23

## 2022-02-23 RX ORDER — POLYETHYLENE GLYCOL 3350 AND ELECTROLYTES WITH LEMON FLAVOR 236; 22.74; 6.74; 5.86; 2.97 G/4L; G/4L; G/4L; G/4L; G/4L
236 POWDER, FOR SOLUTION ORAL
Qty: 1 | Refills: 0 | Status: DISCONTINUED | COMMUNITY
Start: 2021-09-17 | End: 2022-02-23

## 2022-02-23 RX ORDER — APIXABAN 5 MG/1
5 TABLET, FILM COATED ORAL
Qty: 60 | Refills: 2 | Status: COMPLETED | COMMUNITY
Start: 2021-06-23 | End: 2022-02-23

## 2022-02-23 NOTE — PHYSICAL EXAM
[Alert] : alert [No Acute Distress] : in no acute distress [EOMI] : extraocular movements were intact [Normal Appearance] : the appearance of the neck was normal [No Respiratory Distress] : no respiratory distress [Bowel Sounds] : normal bowel sounds [Oriented To Time, Place, And Person] : oriented to person, place, and time [Normal Affect] : the affect was normal

## 2022-02-25 NOTE — REVIEW OF SYSTEMS
[As Noted in HPI] : as noted in HPI [Hesitancy] : urinary hesitancy [Nocturia] : nocturia [Negative] : Gastrointestinal

## 2022-02-25 NOTE — ASSESSMENT
[FreeTextEntry1] : 76 M HTN, glaucoma, HLD, hypovitaminosis D, VTE/PE presents for 4 month follow up.\par \par # PE and LE DVT\par -was on eliquis 5 mg bid, cleared by vascular to stop it\par \par #nightmares\par - disturbance in sleep, possible sleep apnea\par - pulm referral for PFTs\par - patient wants to d/c mirtazapine, can change from 7.5mg at night to 7.5 every other day x 1 week  \par #urinary retention/hesitancy;start on flomax 0.4mg qhs, side effects discussed, urology appt March 10, 2022\par \par # Chronic constipation\par - had daily bowel movement \par - C/w Colace 2 tabs once daily\par \par # Glaucoma-C/w timolol and latanoprost eye drops.\par # Vertigo- meclizine 25 mg tid\par # DM- HbA1c 6.5 (from 6.1 last time)- metformin 500mg BID; patient not having GI symptoms \par #HLD - continue with atorvastatin \par # HTN controlled-C/w lisinopril 10 mg once daily\par \par # HCM\par - Pneumococcal vaccine given April /2021\par - Moderna COVID vaccinated, s/p booster\par - Colonoscopy 11/2021: 3 polyps found, repeat in 3 years  (2024)\par - Flu shot given last visit \par - RTC in 3 months. \par \par

## 2022-02-25 NOTE — HISTORY OF PRESENT ILLNESS
[No falls in past year] : Patient reported no falls in the past year [Completely Independent] : Completely independent. [0] : 2) Feeling down, depressed, or hopeless: Not at all (0) [FreeTextEntry1] : 76 M HTN, glaucoma, HLD, hypovitaminosis D, VTE/PE presents for 4 month follow up. Jami interval health stable, was seen in ED for urinary retention. Due to see urologist March 10. Patient due to see pulmonologist, never made appt.. Needs PFT. Patient able to complete ADL's.

## 2022-03-08 DIAGNOSIS — R33.9 RETENTION OF URINE, UNSPECIFIED: ICD-10-CM

## 2022-03-08 DIAGNOSIS — N40.0 BENIGN PROSTATIC HYPERPLASIA WITHOUT LOWER URINARY TRACT SYMPTOMS: ICD-10-CM

## 2022-03-08 DIAGNOSIS — I82.409 ACUTE EMBOLISM AND THROMBOSIS OF UNSPECIFIED DEEP VEINS OF UNSPECIFIED LOWER EXTREMITY: ICD-10-CM

## 2022-03-08 DIAGNOSIS — R07.89 OTHER CHEST PAIN: ICD-10-CM

## 2022-03-08 DIAGNOSIS — I26.99 OTHER PULMONARY EMBOLISM WITHOUT ACUTE COR PULMONALE: ICD-10-CM

## 2022-03-25 NOTE — ED CDU PROVIDER DISPOSITION NOTE - NS ED ATTENDING STATEMENT MOD
I have personally seen and examined this patient.  I have fully participated in the care of this patient. I have reviewed all pertinent clinical information, including history, physical exam, plan and the Resident’s note and agree except as noted. [id number]: || ||00\01||False;

## 2022-04-12 NOTE — PATIENT PROFILE ADULT - NSPROSPHOSPCHAPLAINYN_GEN_A_NUR
no Provider Procedure Text (E): After obtaining clear surgical margins the defect was repaired by another provider.

## 2022-04-22 NOTE — ED ADULT NURSE NOTE - CHIEF COMPLAINT
Patient to continue utilizing medical therapy as well and counseling sources as applicable for condition  If  suicidal thought or fear of suicide to contact 911 and seek help immediately   Meds reviewed and patient questions answered today The patient is a 75y Male complaining of dizziness.

## 2022-05-02 ENCOUNTER — EMERGENCY (EMERGENCY)
Facility: HOSPITAL | Age: 77
LOS: 0 days | Discharge: HOME | End: 2022-05-02
Attending: EMERGENCY MEDICINE | Admitting: EMERGENCY MEDICINE
Payer: MEDICAID

## 2022-05-02 VITALS
HEART RATE: 86 BPM | RESPIRATION RATE: 18 BRPM | TEMPERATURE: 98 F | OXYGEN SATURATION: 98 % | SYSTOLIC BLOOD PRESSURE: 141 MMHG | DIASTOLIC BLOOD PRESSURE: 70 MMHG

## 2022-05-02 VITALS
HEIGHT: 69 IN | HEART RATE: 92 BPM | TEMPERATURE: 98 F | RESPIRATION RATE: 19 BRPM | SYSTOLIC BLOOD PRESSURE: 148 MMHG | DIASTOLIC BLOOD PRESSURE: 67 MMHG | OXYGEN SATURATION: 99 %

## 2022-05-02 DIAGNOSIS — Z86.711 PERSONAL HISTORY OF PULMONARY EMBOLISM: ICD-10-CM

## 2022-05-02 DIAGNOSIS — R39.12 POOR URINARY STREAM: ICD-10-CM

## 2022-05-02 DIAGNOSIS — Z79.82 LONG TERM (CURRENT) USE OF ASPIRIN: ICD-10-CM

## 2022-05-02 DIAGNOSIS — R10.9 UNSPECIFIED ABDOMINAL PAIN: ICD-10-CM

## 2022-05-02 DIAGNOSIS — E78.5 HYPERLIPIDEMIA, UNSPECIFIED: ICD-10-CM

## 2022-05-02 DIAGNOSIS — N39.0 URINARY TRACT INFECTION, SITE NOT SPECIFIED: ICD-10-CM

## 2022-05-02 DIAGNOSIS — N40.0 BENIGN PROSTATIC HYPERPLASIA WITHOUT LOWER URINARY TRACT SYMPTOMS: ICD-10-CM

## 2022-05-02 DIAGNOSIS — I10 ESSENTIAL (PRIMARY) HYPERTENSION: ICD-10-CM

## 2022-05-02 LAB
ALBUMIN SERPL ELPH-MCNC: 4.3 G/DL — SIGNIFICANT CHANGE UP (ref 3.5–5.2)
ALP SERPL-CCNC: 122 U/L — HIGH (ref 30–115)
ALT FLD-CCNC: 30 U/L — SIGNIFICANT CHANGE UP (ref 0–41)
ANION GAP SERPL CALC-SCNC: 12 MMOL/L — SIGNIFICANT CHANGE UP (ref 7–14)
APPEARANCE UR: ABNORMAL
AST SERPL-CCNC: 28 U/L — SIGNIFICANT CHANGE UP (ref 0–41)
BACTERIA # UR AUTO: ABNORMAL
BASOPHILS # BLD AUTO: 0.04 K/UL — SIGNIFICANT CHANGE UP (ref 0–0.2)
BASOPHILS NFR BLD AUTO: 0.5 % — SIGNIFICANT CHANGE UP (ref 0–1)
BILIRUB SERPL-MCNC: 0.4 MG/DL — SIGNIFICANT CHANGE UP (ref 0.2–1.2)
BILIRUB UR-MCNC: NEGATIVE — SIGNIFICANT CHANGE UP
BUN SERPL-MCNC: 18 MG/DL — SIGNIFICANT CHANGE UP (ref 10–20)
CALCIUM SERPL-MCNC: 9.2 MG/DL — SIGNIFICANT CHANGE UP (ref 8.5–10.1)
CHLORIDE SERPL-SCNC: 104 MMOL/L — SIGNIFICANT CHANGE UP (ref 98–110)
CO2 SERPL-SCNC: 26 MMOL/L — SIGNIFICANT CHANGE UP (ref 17–32)
COLOR SPEC: YELLOW — SIGNIFICANT CHANGE UP
CREAT SERPL-MCNC: 0.9 MG/DL — SIGNIFICANT CHANGE UP (ref 0.7–1.5)
DIFF PNL FLD: NEGATIVE — SIGNIFICANT CHANGE UP
EGFR: 89 ML/MIN/1.73M2 — SIGNIFICANT CHANGE UP
EOSINOPHIL # BLD AUTO: 0.11 K/UL — SIGNIFICANT CHANGE UP (ref 0–0.7)
EOSINOPHIL NFR BLD AUTO: 1.3 % — SIGNIFICANT CHANGE UP (ref 0–8)
EPI CELLS # UR: 0 /HPF — SIGNIFICANT CHANGE UP (ref 0–5)
GLUCOSE SERPL-MCNC: 124 MG/DL — HIGH (ref 70–99)
GLUCOSE UR QL: NEGATIVE — SIGNIFICANT CHANGE UP
HCT VFR BLD CALC: 43.1 % — SIGNIFICANT CHANGE UP (ref 42–52)
HGB BLD-MCNC: 14.2 G/DL — SIGNIFICANT CHANGE UP (ref 14–18)
HYALINE CASTS # UR AUTO: 0 /LPF — SIGNIFICANT CHANGE UP (ref 0–7)
IMM GRANULOCYTES NFR BLD AUTO: 0.1 % — SIGNIFICANT CHANGE UP (ref 0.1–0.3)
KETONES UR-MCNC: NEGATIVE — SIGNIFICANT CHANGE UP
LEUKOCYTE ESTERASE UR-ACNC: ABNORMAL
LYMPHOCYTES # BLD AUTO: 2.26 K/UL — SIGNIFICANT CHANGE UP (ref 1.2–3.4)
LYMPHOCYTES # BLD AUTO: 26.1 % — SIGNIFICANT CHANGE UP (ref 20.5–51.1)
MCHC RBC-ENTMCNC: 26.8 PG — LOW (ref 27–31)
MCHC RBC-ENTMCNC: 32.9 G/DL — SIGNIFICANT CHANGE UP (ref 32–37)
MCV RBC AUTO: 81.5 FL — SIGNIFICANT CHANGE UP (ref 80–94)
MONOCYTES # BLD AUTO: 0.86 K/UL — HIGH (ref 0.1–0.6)
MONOCYTES NFR BLD AUTO: 9.9 % — HIGH (ref 1.7–9.3)
NEUTROPHILS # BLD AUTO: 5.37 K/UL — SIGNIFICANT CHANGE UP (ref 1.4–6.5)
NEUTROPHILS NFR BLD AUTO: 62.1 % — SIGNIFICANT CHANGE UP (ref 42.2–75.2)
NITRITE UR-MCNC: POSITIVE
NRBC # BLD: 0 /100 WBCS — SIGNIFICANT CHANGE UP (ref 0–0)
PH UR: 7.5 — SIGNIFICANT CHANGE UP (ref 5–8)
PLATELET # BLD AUTO: 218 K/UL — SIGNIFICANT CHANGE UP (ref 130–400)
POTASSIUM SERPL-MCNC: 4.5 MMOL/L — SIGNIFICANT CHANGE UP (ref 3.5–5)
POTASSIUM SERPL-SCNC: 4.5 MMOL/L — SIGNIFICANT CHANGE UP (ref 3.5–5)
PROT SERPL-MCNC: 6.9 G/DL — SIGNIFICANT CHANGE UP (ref 6–8)
PROT UR-MCNC: SIGNIFICANT CHANGE UP
RBC # BLD: 5.29 M/UL — SIGNIFICANT CHANGE UP (ref 4.7–6.1)
RBC # FLD: 13.7 % — SIGNIFICANT CHANGE UP (ref 11.5–14.5)
RBC CASTS # UR COMP ASSIST: 4 /HPF — SIGNIFICANT CHANGE UP (ref 0–4)
SODIUM SERPL-SCNC: 142 MMOL/L — SIGNIFICANT CHANGE UP (ref 135–146)
SP GR SPEC: 1.02 — SIGNIFICANT CHANGE UP (ref 1.01–1.03)
UROBILINOGEN FLD QL: ABNORMAL
WBC # BLD: 8.65 K/UL — SIGNIFICANT CHANGE UP (ref 4.8–10.8)
WBC # FLD AUTO: 8.65 K/UL — SIGNIFICANT CHANGE UP (ref 4.8–10.8)
WBC UR QL: 57 /HPF — HIGH (ref 0–5)

## 2022-05-02 PROCEDURE — 99284 EMERGENCY DEPT VISIT MOD MDM: CPT

## 2022-05-02 RX ORDER — CEFPODOXIME PROXETIL 100 MG
1 TABLET ORAL
Qty: 14 | Refills: 0
Start: 2022-05-02 | End: 2022-05-08

## 2022-05-02 RX ORDER — CEFTRIAXONE 500 MG/1
1000 INJECTION, POWDER, FOR SOLUTION INTRAMUSCULAR; INTRAVENOUS ONCE
Refills: 0 | Status: COMPLETED | OUTPATIENT
Start: 2022-05-02 | End: 2022-05-02

## 2022-05-02 RX ADMIN — CEFTRIAXONE 100 MILLIGRAM(S): 500 INJECTION, POWDER, FOR SOLUTION INTRAMUSCULAR; INTRAVENOUS at 17:02

## 2022-05-02 NOTE — ED PROVIDER NOTE - ATTENDING APP SHARED VISIT CONTRIBUTION OF CARE
59 y/o male h/o HTN, HLD, BPH scheduled for surgery later this month p/w decreased urine outpt over the past 2 days, persistent, denies modifying factors, denies fever, n/v/d, anorexia, cough, respiratory sx, change in bowel habits or urinary sx, penile d/c or other associated complaints at present.     Old chart reviewed.  I have reviewed and agree with the initial nursing note, except as documented in my note.    VSS, awake, alert, non-toxic appearing, lying comfortably in stretcher, in NAD, oropharynx clear, mmm, chest CTAB, non-labored breathing, no w/r/r, +S1/S2, RRR, no m/r/g, abdomen soft, NT, +BS, no peripheral edema or deformities, alert, clear speech and steady gait.

## 2022-05-02 NOTE — ED PROVIDER NOTE - PROGRESS NOTE DETAILS
pt voided without difficulty.  will d/c home on abx.  urinary retention precautions discussed. f/u with

## 2022-05-02 NOTE — ED ADULT NURSE NOTE - NSIMPLEMENTINTERV_GEN_ALL_ED
Implemented All Universal Safety Interventions:  [place] to call system. Call bell, personal items and telephone within reach. Instruct patient to call for assistance. Room bathroom lighting operational. Non-slip footwear when patient is off stretcher. Physically safe environment: no spills, clutter or unnecessary equipment. Stretcher in lowest position, wheels locked, appropriate side rails in place.

## 2022-05-02 NOTE — ED PROVIDER NOTE - PATIENT PORTAL LINK FT
You can access the FollowMyHealth Patient Portal offered by [facility] by registering at the following website: [email or web address]. By joining [facility] FollowMyHealth portal, you will also be able to view your health information using other applications (apps) compatible with our system.

## 2022-05-02 NOTE — ED PROVIDER NOTE - NS ED ATTENDING STATEMENT MOD
This was a shared visit with the YARA. I reviewed and verified the documentation and independently performed the documented:

## 2022-05-02 NOTE — ED PROVIDER NOTE - OBJECTIVE STATEMENT
77 y.o male w/ hx of HTL, HLD, BPH, presents to the ED for evaluation.  States over past 2 days decreased urinary output and mild suprapubic pain.  Throbbing, mild severity, no alleviating factors, no radiation of pain.  NO fever, chills, flank pain, nausea, vomiting, abd pain.  NO further complaints.

## 2022-05-02 NOTE — ED PROVIDER NOTE - NS ED ROS FT
Constitutional: See HPI.  Eyes: No visual changes, eye pain or discharge.  ENMT: No hearing changes, pain, discharge or infections.   Cardiac: No SOB or edema. No chest pain with exertion.  Respiratory: No cough or respiratory distress.   GI: No nausea, vomiting, diarrhea or abdominal pain.  : + urinary sxs.   MS: No myalgia, muscle weakness, joint pain or back pain.  Neuro: No headache or weakness  Skin: No skin rash.  Except as documented in the HPI, all other systems are negative.

## 2022-05-02 NOTE — ED PROVIDER NOTE - PHYSICAL EXAMINATION
CONST: Well appearing in NAD  EYES:  Sclera and conjunctiva clear.  CARD: Normal S1 S2; Normal rate and rhythm  RESP: Equal BS B/L, No wheezes, rhonchi or rales. No distress  GI: Soft, non-tender, non-distended.  : mild suprapubic discomfort, no CVA tenderness  MS: Normal ROM in all extremities. No edema of lower extremities, no calf pain, radial pulses 2+ bilaterally  SKIN: Warm, dry, no acute rashes. Good turgor  NEURO: A&Ox3, No focal deficits. Strength 5/5 with no sensory deficits.

## 2022-05-16 ENCOUNTER — OUTPATIENT (OUTPATIENT)
Dept: OUTPATIENT SERVICES | Facility: HOSPITAL | Age: 77
LOS: 1 days | Discharge: HOME | End: 2022-05-16

## 2022-05-16 ENCOUNTER — APPOINTMENT (OUTPATIENT)
Dept: OPHTHALMOLOGY | Facility: CLINIC | Age: 77
End: 2022-05-16
Payer: MEDICAID

## 2022-05-16 PROCEDURE — ZZZZZ: CPT

## 2022-05-23 DIAGNOSIS — H40.1232 LOW-TENSION GLAUCOMA, BILATERAL, MODERATE STAGE: ICD-10-CM

## 2022-05-23 DIAGNOSIS — H25.813 COMBINED FORMS OF AGE-RELATED CATARACT, BILATERAL: ICD-10-CM

## 2022-05-23 DIAGNOSIS — E11.9 TYPE 2 DIABETES MELLITUS WITHOUT COMPLICATIONS: ICD-10-CM

## 2022-05-23 DIAGNOSIS — H35.033 HYPERTENSIVE RETINOPATHY, BILATERAL: ICD-10-CM

## 2022-05-26 ENCOUNTER — APPOINTMENT (OUTPATIENT)
Dept: GERIATRICS | Facility: CLINIC | Age: 77
End: 2022-05-26
Payer: MEDICAID

## 2022-05-26 ENCOUNTER — NON-APPOINTMENT (OUTPATIENT)
Age: 77
End: 2022-05-26

## 2022-05-26 ENCOUNTER — OUTPATIENT (OUTPATIENT)
Dept: OUTPATIENT SERVICES | Facility: HOSPITAL | Age: 77
LOS: 1 days | Discharge: HOME | End: 2022-05-26
Payer: MEDICAID

## 2022-05-26 VITALS
SYSTOLIC BLOOD PRESSURE: 133 MMHG | HEIGHT: 68 IN | HEART RATE: 82 BPM | WEIGHT: 212 LBS | BODY MASS INDEX: 32.13 KG/M2 | OXYGEN SATURATION: 97 % | TEMPERATURE: 96.4 F | DIASTOLIC BLOOD PRESSURE: 72 MMHG

## 2022-05-26 PROCEDURE — 93010 ELECTROCARDIOGRAM REPORT: CPT

## 2022-05-26 PROCEDURE — 99214 OFFICE O/P EST MOD 30 MIN: CPT

## 2022-05-26 NOTE — HISTORY OF PRESENT ILLNESS
[FreeTextEntry1] : 76 M HTN, glaucoma, HLD, hypovitaminosis D, VTE/PE eliquis stopped by vascular, presents for follow up. He was last seen in Feb 2022 and was discharged to do blood work and follow up with cardiology. He is here for preop testing. He was seen in ER earlier this month, for UTI and was discharged to follow up with urology and PMD.

## 2022-05-26 NOTE — PHYSICAL EXAM
[Alert] : alert [No Acute Distress] : in no acute distress [Sclera] : the sclera and conjunctiva were normal [Normal Outer Ear/Nose] : the ears and nose were normal in appearance [No Respiratory Distress] : no respiratory distress [Normal S1, S2] : normal S1 and S2 [Heart Rate And Rhythm] : heart rate was normal and rhythm regular [Normal Appearance] : normal in appearance [Bowel Sounds] : normal bowel sounds [Normal Color / Pigmentation] : normal skin color and pigmentation [No Focal Deficits] : no focal deficits

## 2022-05-26 NOTE — REVIEW OF SYSTEMS
[Fever] : no fever [Chills] : no chills [Feeling Poorly] : not feeling poorly [Feeling Tired] : not feeling tired [Eye Pain] : no eye pain [Red Eyes] : eyes not red [Eyesight Problems] : no eyesight problems [Earache] : no earache [Loss Of Hearing] : no hearing loss [Heart Rate Is Slow] : the heart rate was not slow [Shortness Of Breath] : no shortness of breath [Wheezing] : no wheezing [Abdominal Pain] : no abdominal pain [Vomiting] : no vomiting [Constipation] : no constipation [Melena] : no melena [Dysuria] : no dysuria [Hesitancy] : no urinary hesitancy [Joint Swelling] : no joint swelling [Joint Stiffness] : no joint stiffness

## 2022-05-26 NOTE — ASSESSMENT
[FreeTextEntry1] : Preop testing for urological procedure at [facility]?\par was delayed from May 2022\par Results awaiting new workup ordered\par The  will attach results and let Dr. Palomares know to forward the clearance to:\par \par [facility]\par Sunshine Isidro MD\par [address]\par [place] [address]\par [phone]\par Fax:\par [phone]\par \par # Hx of PE and LE DVT, \par -was on eliquis 5 mg bid, cleared by vascular to stop it\par \par #nightmares\par - disturbance in sleep, possible sleep apnea\par - pulm referral for PFTs, not seen yet\par -  mirtazapine 7.5 every other day x 1 week \par \par #urinary retention/hesitancy;\par  flomax 0.4mg qhs, side effects discussed\par urology appt was on March 10, 2022\par \par # Chronic constipation\par - had daily bowel movement \par - C/w Colace 2 tabs once daily\par \par # Glaucoma\par -C/w timolol and latanoprost eye drops.\par \par # Vertigo\par - meclizine 25 mg tid\par \par # DM\par - HbA1c 6.5 2021\par - metformin 500mg BID\par \par #HLD \par - continue with atorvastatin \par \par # HTN controlled\par -C/w lisinopril 10 mg once daily\par \par # HCM\par - Pneumococcal vaccine given April /2021\par - Moderna COVID vaccinated, s/p booster\par - Colonoscopy 11/2021: 3 polyps found, repeat in 3 years (2024)\par - Flu shot given last visit \par - RTC in 3 months.

## 2022-05-26 NOTE — END OF VISIT
[] : Resident [FreeTextEntry3] : SEEN WITH JASS TEAM\par Is scheduled to have Urolift urologic procedure next month at [facility] with Dr. Isidro.\par ordering PAST data with labs, CXR, and EKG\par EKG done here now - NSR;no ectopy or ischemia; Stable\par Can take BP meds with sip of water on AM of procedure; otherwise to hold all other medications.\par Hemodynamically stable for anticipated procedure.\par

## 2022-05-31 ENCOUNTER — OUTPATIENT (OUTPATIENT)
Dept: OUTPATIENT SERVICES | Facility: HOSPITAL | Age: 77
LOS: 1 days | Discharge: HOME | End: 2022-05-31

## 2022-05-31 ENCOUNTER — APPOINTMENT (OUTPATIENT)
Dept: SPEECH THERAPY | Facility: CLINIC | Age: 77
End: 2022-05-31

## 2022-06-01 DIAGNOSIS — J81.0 ACUTE PULMONARY EDEMA: ICD-10-CM

## 2022-06-01 DIAGNOSIS — F32.A DEPRESSION, UNSPECIFIED: ICD-10-CM

## 2022-06-01 DIAGNOSIS — H90.3 SENSORINEURAL HEARING LOSS, BILATERAL: ICD-10-CM

## 2022-06-01 DIAGNOSIS — N40.0 BENIGN PROSTATIC HYPERPLASIA WITHOUT LOWER URINARY TRACT SYMPTOMS: ICD-10-CM

## 2022-06-01 DIAGNOSIS — I51.7 CARDIOMEGALY: ICD-10-CM

## 2022-06-01 DIAGNOSIS — I10 ESSENTIAL (PRIMARY) HYPERTENSION: ICD-10-CM

## 2022-06-02 ENCOUNTER — NON-APPOINTMENT (OUTPATIENT)
Age: 77
End: 2022-06-02

## 2022-06-02 ENCOUNTER — OUTPATIENT (OUTPATIENT)
Dept: OUTPATIENT SERVICES | Facility: HOSPITAL | Age: 77
LOS: 1 days | Discharge: HOME | End: 2022-06-02
Payer: MEDICAID

## 2022-06-02 DIAGNOSIS — Z00.00 ENCOUNTER FOR GENERAL ADULT MEDICAL EXAMINATION WITHOUT ABNORMAL FINDINGS: ICD-10-CM

## 2022-06-02 PROCEDURE — 71046 X-RAY EXAM CHEST 2 VIEWS: CPT | Mod: 26

## 2022-06-13 ENCOUNTER — APPOINTMENT (OUTPATIENT)
Dept: OPHTHALMOLOGY | Facility: CLINIC | Age: 77
End: 2022-06-13

## 2022-07-01 ENCOUNTER — APPOINTMENT (OUTPATIENT)
Dept: PULMONOLOGY | Facility: CLINIC | Age: 77
End: 2022-07-01
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
## 2022-09-06 ENCOUNTER — OUTPATIENT (OUTPATIENT)
Dept: OUTPATIENT SERVICES | Facility: HOSPITAL | Age: 77
LOS: 1 days | Discharge: HOME | End: 2022-09-06

## 2022-09-06 ENCOUNTER — APPOINTMENT (OUTPATIENT)
Dept: OPHTHALMOLOGY | Facility: CLINIC | Age: 77
End: 2022-09-06

## 2022-09-06 PROCEDURE — 92014 COMPRE OPH EXAM EST PT 1/>: CPT

## 2022-09-06 PROCEDURE — 92134 CPTRZ OPH DX IMG PST SGM RTA: CPT | Mod: 26

## 2022-09-08 DIAGNOSIS — H40.1230 LOW-TENSION GLAUCOMA, BILATERAL, STAGE UNSPECIFIED: ICD-10-CM

## 2022-09-08 DIAGNOSIS — H25.13 AGE-RELATED NUCLEAR CATARACT, BILATERAL: ICD-10-CM

## 2022-09-08 DIAGNOSIS — H04.123 DRY EYE SYNDROME OF BILATERAL LACRIMAL GLANDS: ICD-10-CM

## 2022-09-13 ENCOUNTER — APPOINTMENT (OUTPATIENT)
Dept: SPEECH THERAPY | Facility: CLINIC | Age: 77
End: 2022-09-13

## 2022-09-13 ENCOUNTER — OUTPATIENT (OUTPATIENT)
Dept: OUTPATIENT SERVICES | Facility: HOSPITAL | Age: 77
LOS: 1 days | Discharge: HOME | End: 2022-09-13

## 2022-09-13 DIAGNOSIS — H90.3 SENSORINEURAL HEARING LOSS, BILATERAL: ICD-10-CM

## 2022-09-28 ENCOUNTER — APPOINTMENT (OUTPATIENT)
Dept: SPEECH THERAPY | Facility: CLINIC | Age: 77
End: 2022-09-28

## 2022-09-28 ENCOUNTER — OUTPATIENT (OUTPATIENT)
Dept: OUTPATIENT SERVICES | Facility: HOSPITAL | Age: 77
LOS: 1 days | Discharge: HOME | End: 2022-09-28

## 2022-09-28 DIAGNOSIS — H90.3 SENSORINEURAL HEARING LOSS, BILATERAL: ICD-10-CM

## 2022-10-04 ENCOUNTER — APPOINTMENT (OUTPATIENT)
Dept: OPHTHALMOLOGY | Facility: CLINIC | Age: 77
End: 2022-10-04

## 2022-10-12 ENCOUNTER — APPOINTMENT (OUTPATIENT)
Dept: GERIATRICS | Facility: CLINIC | Age: 77
End: 2022-10-12

## 2022-10-12 ENCOUNTER — OUTPATIENT (OUTPATIENT)
Dept: OUTPATIENT SERVICES | Facility: HOSPITAL | Age: 77
LOS: 1 days | Discharge: HOME | End: 2022-10-12

## 2022-10-12 VITALS
WEIGHT: 209 LBS | DIASTOLIC BLOOD PRESSURE: 74 MMHG | TEMPERATURE: 98.2 F | OXYGEN SATURATION: 98 % | BODY MASS INDEX: 31.67 KG/M2 | HEART RATE: 75 BPM | SYSTOLIC BLOOD PRESSURE: 139 MMHG | HEIGHT: 68 IN

## 2022-10-12 LAB
ALBUMIN SERPL ELPH-MCNC: 4.5 G/DL
ALP BLD-CCNC: 131 U/L
ALT SERPL-CCNC: 48 U/L
ANION GAP SERPL CALC-SCNC: 13 MMOL/L
AST SERPL-CCNC: 34 U/L
BASOPHILS # BLD AUTO: 0.04 K/UL
BASOPHILS NFR BLD AUTO: 0.6 %
BILIRUB SERPL-MCNC: 0.5 MG/DL
BUN SERPL-MCNC: 13 MG/DL
CALCIUM SERPL-MCNC: 9.7 MG/DL
CHLORIDE SERPL-SCNC: 103 MMOL/L
CO2 SERPL-SCNC: 25 MMOL/L
CREAT SERPL-MCNC: 1.1 MG/DL
EGFR: 70 ML/MIN/1.73M2
EOSINOPHIL # BLD AUTO: 0.12 K/UL
EOSINOPHIL NFR BLD AUTO: 1.9 %
GLUCOSE SERPL-MCNC: 174 MG/DL
HCT VFR BLD CALC: 43.9 %
HGB BLD-MCNC: 13.9 G/DL
IMM GRANULOCYTES NFR BLD AUTO: 0.2 %
INR PPP: 0.96 RATIO
LYMPHOCYTES # BLD AUTO: 2.36 K/UL
LYMPHOCYTES NFR BLD AUTO: 36.9 %
MAN DIFF?: NORMAL
MCHC RBC-ENTMCNC: 26.2 PG
MCHC RBC-ENTMCNC: 31.7 G/DL
MCV RBC AUTO: 82.8 FL
MONOCYTES # BLD AUTO: 0.58 K/UL
MONOCYTES NFR BLD AUTO: 9.1 %
NEUTROPHILS # BLD AUTO: 3.29 K/UL
NEUTROPHILS NFR BLD AUTO: 51.3 %
PLATELET # BLD AUTO: 243 K/UL
POTASSIUM SERPL-SCNC: 3.9 MMOL/L
PROT SERPL-MCNC: 7.2 G/DL
PT BLD: 11.1 SEC
RBC # BLD: 5.3 M/UL
RBC # FLD: 13.1 %
SODIUM SERPL-SCNC: 141 MMOL/L
WBC # FLD AUTO: 6.4 K/UL

## 2022-10-12 PROCEDURE — 99214 OFFICE O/P EST MOD 30 MIN: CPT | Mod: GC

## 2022-10-12 RX ORDER — DICLOFENAC SODIUM 1% 10 MG/G
1 GEL TOPICAL DAILY
Qty: 1 | Refills: 0 | Status: DISCONTINUED | COMMUNITY
Start: 2022-02-23 | End: 2022-10-12

## 2022-10-12 RX ORDER — MECLIZINE HYDROCHLORIDE 25 MG/1
25 TABLET ORAL
Qty: 30 | Refills: 5 | Status: COMPLETED | COMMUNITY
Start: 2021-06-23 | End: 2022-10-12

## 2022-10-12 RX ORDER — TAMSULOSIN HYDROCHLORIDE 0.4 MG/1
0.4 CAPSULE ORAL
Qty: 30 | Refills: 0 | Status: COMPLETED | COMMUNITY
Start: 2022-02-23 | End: 2022-10-12

## 2022-10-13 DIAGNOSIS — G47.9 SLEEP DISORDER, UNSPECIFIED: ICD-10-CM

## 2022-10-13 DIAGNOSIS — I10 ESSENTIAL (PRIMARY) HYPERTENSION: ICD-10-CM

## 2022-10-13 DIAGNOSIS — D12.6 BENIGN NEOPLASM OF COLON, UNSPECIFIED: ICD-10-CM

## 2022-10-13 DIAGNOSIS — E78.5 HYPERLIPIDEMIA, UNSPECIFIED: ICD-10-CM

## 2022-10-13 DIAGNOSIS — E11.9 TYPE 2 DIABETES MELLITUS WITHOUT COMPLICATIONS: ICD-10-CM

## 2022-10-13 LAB
25(OH)D3 SERPL-MCNC: 21 NG/ML
ALBUMIN SERPL ELPH-MCNC: 4.3 G/DL
ALP BLD-CCNC: 110 U/L
ALT SERPL-CCNC: 26 U/L
ANION GAP SERPL CALC-SCNC: 12 MMOL/L
AST SERPL-CCNC: 18 U/L
BASOPHILS # BLD AUTO: 0.03 K/UL
BASOPHILS NFR BLD AUTO: 0.5 %
BILIRUB SERPL-MCNC: 0.5 MG/DL
BUN SERPL-MCNC: 10 MG/DL
CALCIUM SERPL-MCNC: 9.3 MG/DL
CHLORIDE SERPL-SCNC: 102 MMOL/L
CHOLEST SERPL-MCNC: 145 MG/DL
CO2 SERPL-SCNC: 27 MMOL/L
CREAT SERPL-MCNC: 1.1 MG/DL
EGFR: 70 ML/MIN/1.73M2
EOSINOPHIL # BLD AUTO: 0.11 K/UL
EOSINOPHIL NFR BLD AUTO: 1.7 %
ESTIMATED AVERAGE GLUCOSE: 174 MG/DL
GLUCOSE SERPL-MCNC: 241 MG/DL
HBA1C MFR BLD HPLC: 7.7 %
HCT VFR BLD CALC: 43.6 %
HDLC SERPL-MCNC: 56 MG/DL
HGB BLD-MCNC: 14.1 G/DL
IMM GRANULOCYTES NFR BLD AUTO: 0.2 %
LDLC SERPL CALC-MCNC: 68 MG/DL
LYMPHOCYTES # BLD AUTO: 1.49 K/UL
LYMPHOCYTES NFR BLD AUTO: 22.7 %
MAN DIFF?: NORMAL
MCHC RBC-ENTMCNC: 26.3 PG
MCHC RBC-ENTMCNC: 32.3 G/DL
MCV RBC AUTO: 81.3 FL
MONOCYTES # BLD AUTO: 0.6 K/UL
MONOCYTES NFR BLD AUTO: 9.1 %
NEUTROPHILS # BLD AUTO: 4.33 K/UL
NEUTROPHILS NFR BLD AUTO: 65.8 %
NONHDLC SERPL-MCNC: 89 MG/DL
PLATELET # BLD AUTO: 219 K/UL
POTASSIUM SERPL-SCNC: 4.1 MMOL/L
PROT SERPL-MCNC: 6.8 G/DL
RBC # BLD: 5.36 M/UL
RBC # FLD: 13.5 %
SODIUM SERPL-SCNC: 141 MMOL/L
TRIGL SERPL-MCNC: 104 MG/DL
TSH SERPL-ACNC: 1.59 UIU/ML
VIT B12 SERPL-MCNC: 982 PG/ML
WBC # FLD AUTO: 6.57 K/UL

## 2022-10-13 NOTE — REVIEW OF SYSTEMS
[Feeling Poorly] : feeling poorly [Feeling Tired] : feeling tired [Loss Of Hearing] : hearing loss [Constipation] : constipation [As Noted in HPI] : as noted in HPI [Sleep Disturbances] : sleep disturbances [Fever] : no fever [Chills] : no chills [Recent Weight Gain (___ Lbs)] : no recent weight gain [Recent Weight Loss (___ Lbs)] : no recent weight loss [Eye Pain] : no eye pain [Red Eyes] : eyes not red [Eyesight Problems] : no eyesight problems [Earache] : no earache [Nosebleeds] : no nosebleeds [Nasal Discharge] : no nasal discharge [Sore Throat] : no sore throat [Hoarseness] : no hoarseness [Chest Pain] : no chest pain [Palpitations] : no palpitations [Lower Ext Edema] : no extremity edema [Shortness Of Breath] : no shortness of breath [Wheezing] : no wheezing [Cough] : no cough [SOB on Exertion] : no shortness of breath during exertion [Orthopnea] : no orthopnea [Abdominal Pain] : no abdominal pain [Vomiting] : no vomiting [Diarrhea] : no diarrhea [Heartburn] : no heartburn [Melena] : no melena [Dysuria] : no dysuria [Incontinence] : no incontinence [Hesitancy] : no urinary hesitancy [Nocturia] : no nocturia [Joint Swelling] : no joint swelling [Limb Pain] : no limb pain [Dizziness] : no dizziness [Difficulty Walking] : no difficulty walking

## 2022-10-13 NOTE — ASSESSMENT
[FreeTextEntry1] : Fatigue/weakness likely multifactorial, no alarming signs or symptoms at this time, no weight loss, but at risk due to decrease appetite\par DM type 2: not sure if he takes metformin\par - will check HgA1c, louisa B12\par - opthalmology UTD\par \par HTN well controlled with current medications, c/w amlodipine and lisinopril\par \par Hyperlipidemia: c/w Liptor, no muscle/limb pain reported\par \par Sleep disturbance: now resolved with home remedy, not sure what is in the herbal preparation, but may contain some sedating substance \par - advised to hold-off on the preparation for now\par \par Hx of tubular adenoma of the colon in 2018: repeated in 2021, 3 polyps, needs colonoscopy in 2024\par \par HX 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

## 2022-10-13 NOTE — PHYSICAL EXAM
[Alert] : alert [No Acute Distress] : in no acute distress [Sclera] : the sclera and conjunctiva were normal [No Neck Mass] : no neck mass was observed [Thyroid Not Enlarged] : the thyroid was not enlarged [Supple] : the neck was supple [No Respiratory Distress] : no respiratory distress [Auscultation Breath Sounds / Voice Sounds] : lungs were clear to auscultation bilaterally [Normal S1, S2] : normal S1 and S2 [Heart Rate And Rhythm] : heart rate was normal and rhythm regular [Heart Sounds Gallop] : no gallops [Edema] : edema was not present [No Varicosities] : there were no varicosital changes [Bowel Sounds] : normal bowel sounds [Abdomen Tenderness] : non-tender [Abdomen Soft] : soft [Supraclavicular Lymph Nodes Enlarged Bilaterally] : supraclavicular [No CVA Tenderness] : no CVA  tenderness [No Spinal Tenderness] : no spinal tenderness [Normal Gait] : normal gait [Normal Color / Pigmentation] : normal skin color and pigmentation [No Focal Deficits] : no focal deficits [Oriented To Time, Place, And Person] : oriented to person, place, and time [Normal Affect] : the affect was normal [Recent Memory Normal] : recent memory was not impaired [Normal Insight/Judgment] : insight and judgment were intact [Normal Mood] : the mood was normal [Remote Memory Normal] : remote memory was not impaired [JVD] : there was jugular-venous distention

## 2022-10-13 NOTE — ADDENDUM
[FreeTextEntry1] : Blood work reviewed, HgA1c 7.7: will resume metformin 1000 ER QD\par \par telemedicine visit in 2-3 weeks if weakness/fatigue still persistent

## 2022-10-13 NOTE — HISTORY OF PRESENT ILLNESS
[No falls in past year] : Patient reported no falls in the past year [Completely Independent] : Completely independent. [0] : 2) Feeling down, depressed, or hopeless: Not at all (0) [PHQ-2 Negative - No further assessment needed] : PHQ-2 Negative - No further assessment needed [FreeTextEntry1] : Patient is not feeling well, tired, loss of appetite for the past couple of weeks, no cough, no fever, no palpitations, no CP, no pain, no weight loss.. Sleeps better now since started with herbal preparations with garlic which he takes every night. No recent hospitalizations/illness, no falls. [[id number]] : 0

## 2022-10-17 ENCOUNTER — OUTPATIENT (OUTPATIENT)
Dept: OUTPATIENT SERVICES | Facility: HOSPITAL | Age: 77
LOS: 1 days | Discharge: HOME | End: 2022-10-17

## 2022-10-17 ENCOUNTER — APPOINTMENT (OUTPATIENT)
Dept: SPEECH THERAPY | Facility: CLINIC | Age: 77
End: 2022-10-17

## 2022-10-17 DIAGNOSIS — H90.3 SENSORINEURAL HEARING LOSS, BILATERAL: ICD-10-CM

## 2022-11-16 ENCOUNTER — APPOINTMENT (OUTPATIENT)
Dept: OPHTHALMOLOGY | Facility: CLINIC | Age: 77
End: 2022-11-16

## 2022-11-28 ENCOUNTER — APPOINTMENT (OUTPATIENT)
Dept: GERIATRICS | Facility: CLINIC | Age: 77
End: 2022-11-28

## 2022-11-28 ENCOUNTER — OUTPATIENT (OUTPATIENT)
Dept: OUTPATIENT SERVICES | Facility: HOSPITAL | Age: 77
LOS: 1 days | Discharge: HOME | End: 2022-11-28

## 2022-11-28 PROCEDURE — ZZZZZ: CPT

## 2022-12-06 ENCOUNTER — APPOINTMENT (OUTPATIENT)
Dept: OPHTHALMOLOGY | Facility: CLINIC | Age: 77
End: 2022-12-06

## 2022-12-06 ENCOUNTER — OUTPATIENT (OUTPATIENT)
Dept: OUTPATIENT SERVICES | Facility: HOSPITAL | Age: 77
LOS: 1 days | Discharge: HOME | End: 2022-12-06

## 2022-12-06 ENCOUNTER — LABORATORY RESULT (OUTPATIENT)
Age: 77
End: 2022-12-06

## 2022-12-06 PROCEDURE — 92014 COMPRE OPH EXAM EST PT 1/>: CPT

## 2022-12-15 DIAGNOSIS — H25.13 AGE-RELATED NUCLEAR CATARACT, BILATERAL: ICD-10-CM

## 2022-12-15 DIAGNOSIS — H04.123 DRY EYE SYNDROME OF BILATERAL LACRIMAL GLANDS: ICD-10-CM

## 2022-12-15 DIAGNOSIS — H40.1230 LOW-TENSION GLAUCOMA, BILATERAL, STAGE UNSPECIFIED: ICD-10-CM

## 2022-12-28 DIAGNOSIS — E11.9 TYPE 2 DIABETES MELLITUS WITHOUT COMPLICATIONS: ICD-10-CM

## 2022-12-28 DIAGNOSIS — I10 ESSENTIAL (PRIMARY) HYPERTENSION: ICD-10-CM

## 2023-01-17 ENCOUNTER — OUTPATIENT (OUTPATIENT)
Dept: OUTPATIENT SERVICES | Facility: HOSPITAL | Age: 78
LOS: 1 days | Discharge: HOME | End: 2023-01-17

## 2023-01-17 ENCOUNTER — APPOINTMENT (OUTPATIENT)
Dept: OPHTHALMOLOGY | Facility: CLINIC | Age: 78
End: 2023-01-17
Payer: MEDICAID

## 2023-01-17 PROCEDURE — ZZZZZ: CPT

## 2023-01-23 DIAGNOSIS — H25.13 AGE-RELATED NUCLEAR CATARACT, BILATERAL: ICD-10-CM

## 2023-01-23 DIAGNOSIS — H40.1230 LOW-TENSION GLAUCOMA, BILATERAL, STAGE UNSPECIFIED: ICD-10-CM

## 2023-01-23 DIAGNOSIS — H35.033 HYPERTENSIVE RETINOPATHY, BILATERAL: ICD-10-CM

## 2023-01-23 DIAGNOSIS — H04.123 DRY EYE SYNDROME OF BILATERAL LACRIMAL GLANDS: ICD-10-CM

## 2023-02-23 NOTE — ED CDU PROVIDER SUBSEQUENT DAY NOTE - NS ED ROS FT
CONST: No fever, chills or bodyaches  EYES: No pain, redness, drainage or visual changes.  ENT: No ear pain or discharge, nasal discharge or congestion. No sore throat  CARD: No chest pain, palpitations  RESP: No SOB, cough, hemoptysis. No hx of asthma or COPD  GI: No abdominal pain, N/V/D  MS: No joint pain, back pain or extremity pain/injury  SKIN: No rashes  NEURO: No headache, (+)dizziness, No paresthesias or LOC Hydroxychloroquine Counseling:  I discussed with the patient that a baseline ophthalmologic exam is needed at the start of therapy and every year thereafter while on therapy. A CBC may also be warranted for monitoring.  The side effects of this medication were discussed with the patient, including but not limited to agranulocytosis, aplastic anemia, seizures, rashes, retinopathy, and liver toxicity. Patient instructed to call the office should any adverse effect occur.  The patient verbalized understanding of the proper use and possible adverse effects of Plaquenil.  All the patient's questions and concerns were addressed.

## 2023-02-27 ENCOUNTER — APPOINTMENT (OUTPATIENT)
Dept: OPHTHALMOLOGY | Facility: CLINIC | Age: 78
End: 2023-02-27
Payer: COMMERCIAL

## 2023-02-27 ENCOUNTER — APPOINTMENT (OUTPATIENT)
Dept: OPHTHALMOLOGY | Facility: CLINIC | Age: 78
End: 2023-02-27

## 2023-02-27 ENCOUNTER — OUTPATIENT (OUTPATIENT)
Dept: OUTPATIENT SERVICES | Facility: HOSPITAL | Age: 78
LOS: 1 days | End: 2023-02-27
Payer: COMMERCIAL

## 2023-02-27 DIAGNOSIS — H53.40 UNSPECIFIED VISUAL FIELD DEFECTS: ICD-10-CM

## 2023-02-27 PROCEDURE — 92083 EXTENDED VISUAL FIELD XM: CPT | Mod: 26

## 2023-02-27 PROCEDURE — 92083 EXTENDED VISUAL FIELD XM: CPT

## 2023-03-10 DIAGNOSIS — H40.1232 LOW-TENSION GLAUCOMA, BILATERAL, MODERATE STAGE: ICD-10-CM

## 2023-03-13 ENCOUNTER — APPOINTMENT (OUTPATIENT)
Dept: OPHTHALMOLOGY | Facility: CLINIC | Age: 78
End: 2023-03-13

## 2023-03-16 ENCOUNTER — OUTPATIENT (OUTPATIENT)
Dept: OUTPATIENT SERVICES | Facility: HOSPITAL | Age: 78
LOS: 1 days | End: 2023-03-16
Payer: MEDICAID

## 2023-03-16 DIAGNOSIS — F52.21 MALE ERECTILE DISORDER: ICD-10-CM

## 2023-03-16 PROCEDURE — 93980 PENILE VASCULAR STUDY: CPT | Mod: 26

## 2023-03-16 PROCEDURE — 93980 PENILE VASCULAR STUDY: CPT

## 2023-03-17 DIAGNOSIS — F52.21 MALE ERECTILE DISORDER: ICD-10-CM

## 2023-05-17 NOTE — ED ADULT NURSE NOTE - CHIEF COMPLAINT QUOTE
No
"I have pains all over my body, and I have chest pain for 2 weeks."
Estlander Flap (Upper To Lower Lip) Text: The defect of the lower lip was assessed and measured.  Given the location and size of the defect, an Estlander flap was deemed most appropriate.  Using a sterile surgical marker, an appropriate Estlander flap was measured and drawn on the upper lip. Local anesthesia was then infiltrated. A scalpel was then used to incise the lateral aspect of the flap, through skin, muscle and mucosa, leaving the flap pedicled medially.  The flap was then rotated and positioned to fill the lower lip defect.  The flap was then sutured into place with a three layer technique, closing the orbicularis oris muscle layer with subcutaneous buried sutures, followed by a mucosal layer and an epidermal layer.

## 2023-06-04 ENCOUNTER — EMERGENCY (EMERGENCY)
Facility: HOSPITAL | Age: 78
LOS: 0 days | Discharge: HOME | End: 2023-06-04
Attending: EMERGENCY MEDICINE
Payer: MEDICAID

## 2023-06-04 VITALS
WEIGHT: 210.1 LBS | RESPIRATION RATE: 18 BRPM | HEIGHT: 72 IN | OXYGEN SATURATION: 95 % | TEMPERATURE: 99 F | SYSTOLIC BLOOD PRESSURE: 129 MMHG | DIASTOLIC BLOOD PRESSURE: 75 MMHG | HEART RATE: 91 BPM

## 2023-06-04 VITALS — TEMPERATURE: 99 F

## 2023-06-04 DIAGNOSIS — R53.1 WEAKNESS: ICD-10-CM

## 2023-06-04 DIAGNOSIS — E11.9 TYPE 2 DIABETES MELLITUS WITHOUT COMPLICATIONS: ICD-10-CM

## 2023-06-04 DIAGNOSIS — N40.0 BENIGN PROSTATIC HYPERPLASIA WITHOUT LOWER URINARY TRACT SYMPTOMS: ICD-10-CM

## 2023-06-04 DIAGNOSIS — H55.00 UNSPECIFIED NYSTAGMUS: ICD-10-CM

## 2023-06-04 DIAGNOSIS — N39.0 URINARY TRACT INFECTION, SITE NOT SPECIFIED: ICD-10-CM

## 2023-06-04 DIAGNOSIS — R42 DIZZINESS AND GIDDINESS: ICD-10-CM

## 2023-06-04 DIAGNOSIS — Z79.01 LONG TERM (CURRENT) USE OF ANTICOAGULANTS: ICD-10-CM

## 2023-06-04 DIAGNOSIS — E78.5 HYPERLIPIDEMIA, UNSPECIFIED: ICD-10-CM

## 2023-06-04 DIAGNOSIS — Z88.6 ALLERGY STATUS TO ANALGESIC AGENT: ICD-10-CM

## 2023-06-04 DIAGNOSIS — Z86.711 PERSONAL HISTORY OF PULMONARY EMBOLISM: ICD-10-CM

## 2023-06-04 DIAGNOSIS — I10 ESSENTIAL (PRIMARY) HYPERTENSION: ICD-10-CM

## 2023-06-04 LAB
ALBUMIN SERPL ELPH-MCNC: 4.2 G/DL — SIGNIFICANT CHANGE UP (ref 3.5–5.2)
ALP SERPL-CCNC: 137 U/L — HIGH (ref 30–115)
ALT FLD-CCNC: 26 U/L — SIGNIFICANT CHANGE UP (ref 0–41)
ANION GAP SERPL CALC-SCNC: 10 MMOL/L — SIGNIFICANT CHANGE UP (ref 7–14)
APPEARANCE UR: ABNORMAL
APTT BLD: 29 SEC — SIGNIFICANT CHANGE UP (ref 27–39.2)
AST SERPL-CCNC: 20 U/L — SIGNIFICANT CHANGE UP (ref 0–41)
BACTERIA # UR AUTO: ABNORMAL
BASE EXCESS BLDV CALC-SCNC: -6.4 MMOL/L — LOW (ref -2–3)
BASOPHILS # BLD AUTO: 0.04 K/UL — SIGNIFICANT CHANGE UP (ref 0–0.2)
BASOPHILS NFR BLD AUTO: 0.6 % — SIGNIFICANT CHANGE UP (ref 0–1)
BILIRUB SERPL-MCNC: 0.4 MG/DL — SIGNIFICANT CHANGE UP (ref 0.2–1.2)
BILIRUB UR-MCNC: NEGATIVE — SIGNIFICANT CHANGE UP
BUN SERPL-MCNC: 13 MG/DL — SIGNIFICANT CHANGE UP (ref 10–20)
CALCIUM SERPL-MCNC: 9 MG/DL — SIGNIFICANT CHANGE UP (ref 8.4–10.5)
CHLORIDE SERPL-SCNC: 104 MMOL/L — SIGNIFICANT CHANGE UP (ref 98–110)
CO2 SERPL-SCNC: 25 MMOL/L — SIGNIFICANT CHANGE UP (ref 17–32)
COLOR SPEC: SIGNIFICANT CHANGE UP
CREAT SERPL-MCNC: 0.9 MG/DL — SIGNIFICANT CHANGE UP (ref 0.7–1.5)
DIFF PNL FLD: NEGATIVE — SIGNIFICANT CHANGE UP
EGFR: 89 ML/MIN/1.73M2 — SIGNIFICANT CHANGE UP
EOSINOPHIL # BLD AUTO: 0.32 K/UL — SIGNIFICANT CHANGE UP (ref 0–0.7)
EOSINOPHIL NFR BLD AUTO: 4.9 % — SIGNIFICANT CHANGE UP (ref 0–8)
EPI CELLS # UR: 0 /HPF — SIGNIFICANT CHANGE UP (ref 0–5)
GAS PNL BLDV: 120 MMOL/L — CRITICAL LOW (ref 136–145)
GAS PNL BLDV: SIGNIFICANT CHANGE UP
GAS PNL BLDV: SIGNIFICANT CHANGE UP
GLUCOSE SERPL-MCNC: 161 MG/DL — HIGH (ref 70–99)
GLUCOSE UR QL: NEGATIVE — SIGNIFICANT CHANGE UP
HCO3 BLDV-SCNC: 20 MMOL/L — LOW (ref 22–29)
HCT VFR BLD CALC: 40.4 % — LOW (ref 42–52)
HCT VFR BLDA CALC: 41 % — SIGNIFICANT CHANGE UP (ref 39–51)
HGB BLD CALC-MCNC: 13.8 G/DL — SIGNIFICANT CHANGE UP (ref 12.6–17.4)
HGB BLD-MCNC: 13.2 G/DL — LOW (ref 14–18)
IMM GRANULOCYTES NFR BLD AUTO: 0.2 % — SIGNIFICANT CHANGE UP (ref 0.1–0.3)
INR BLD: 1.02 RATIO — SIGNIFICANT CHANGE UP (ref 0.65–1.3)
KETONES UR-MCNC: NEGATIVE — SIGNIFICANT CHANGE UP
LACTATE BLDV-MCNC: 1.6 MMOL/L — SIGNIFICANT CHANGE UP (ref 0.5–2)
LEUKOCYTE ESTERASE UR-ACNC: NEGATIVE — SIGNIFICANT CHANGE UP
LYMPHOCYTES # BLD AUTO: 1.91 K/UL — SIGNIFICANT CHANGE UP (ref 1.2–3.4)
LYMPHOCYTES # BLD AUTO: 29.2 % — SIGNIFICANT CHANGE UP (ref 20.5–51.1)
MCHC RBC-ENTMCNC: 26 PG — LOW (ref 27–31)
MCHC RBC-ENTMCNC: 32.7 G/DL — SIGNIFICANT CHANGE UP (ref 32–37)
MCV RBC AUTO: 79.7 FL — LOW (ref 80–94)
MONOCYTES # BLD AUTO: 0.68 K/UL — HIGH (ref 0.1–0.6)
MONOCYTES NFR BLD AUTO: 10.4 % — HIGH (ref 1.7–9.3)
NEUTROPHILS # BLD AUTO: 3.57 K/UL — SIGNIFICANT CHANGE UP (ref 1.4–6.5)
NEUTROPHILS NFR BLD AUTO: 54.7 % — SIGNIFICANT CHANGE UP (ref 42.2–75.2)
NITRITE UR-MCNC: POSITIVE
NRBC # BLD: 0 /100 WBCS — SIGNIFICANT CHANGE UP (ref 0–0)
PCO2 BLDV: 43 MMHG — SIGNIFICANT CHANGE UP (ref 42–55)
PH BLDV: 7.28 — LOW (ref 7.32–7.43)
PH UR: 8 — SIGNIFICANT CHANGE UP (ref 5–8)
PLATELET # BLD AUTO: 215 K/UL — SIGNIFICANT CHANGE UP (ref 130–400)
PMV BLD: 10 FL — SIGNIFICANT CHANGE UP (ref 7.4–10.4)
PO2 BLDV: 195 MMHG — SIGNIFICANT CHANGE UP
POTASSIUM SERPL-MCNC: 3.7 MMOL/L — SIGNIFICANT CHANGE UP (ref 3.5–5)
POTASSIUM SERPL-SCNC: 3.7 MMOL/L — SIGNIFICANT CHANGE UP (ref 3.5–5)
PROT SERPL-MCNC: 6.8 G/DL — SIGNIFICANT CHANGE UP (ref 6–8)
PROT UR-MCNC: SIGNIFICANT CHANGE UP
PROTHROM AB SERPL-ACNC: 11.6 SEC — SIGNIFICANT CHANGE UP (ref 9.95–12.87)
RBC # BLD: 5.07 M/UL — SIGNIFICANT CHANGE UP (ref 4.7–6.1)
RBC # FLD: 13.7 % — SIGNIFICANT CHANGE UP (ref 11.5–14.5)
RBC CASTS # UR COMP ASSIST: 0 /HPF — SIGNIFICANT CHANGE UP (ref 0–4)
SAO2 % BLDV: 100 % — SIGNIFICANT CHANGE UP
SODIUM SERPL-SCNC: 139 MMOL/L — SIGNIFICANT CHANGE UP (ref 135–146)
SP GR SPEC: 1 — LOW (ref 1.01–1.03)
TROPONIN T SERPL-MCNC: <0.01 NG/ML — SIGNIFICANT CHANGE UP
UROBILINOGEN FLD QL: SIGNIFICANT CHANGE UP
WBC # BLD: 6.53 K/UL — SIGNIFICANT CHANGE UP (ref 4.8–10.8)
WBC # FLD AUTO: 6.53 K/UL — SIGNIFICANT CHANGE UP (ref 4.8–10.8)
WBC UR QL: 2 /HPF — SIGNIFICANT CHANGE UP (ref 0–5)

## 2023-06-04 PROCEDURE — 87186 SC STD MICRODIL/AGAR DIL: CPT

## 2023-06-04 PROCEDURE — 84132 ASSAY OF SERUM POTASSIUM: CPT

## 2023-06-04 PROCEDURE — 82803 BLOOD GASES ANY COMBINATION: CPT

## 2023-06-04 PROCEDURE — 36415 COLL VENOUS BLD VENIPUNCTURE: CPT

## 2023-06-04 PROCEDURE — 85610 PROTHROMBIN TIME: CPT

## 2023-06-04 PROCEDURE — 85025 COMPLETE CBC W/AUTO DIFF WBC: CPT

## 2023-06-04 PROCEDURE — 71045 X-RAY EXAM CHEST 1 VIEW: CPT | Mod: 26

## 2023-06-04 PROCEDURE — 93010 ELECTROCARDIOGRAM REPORT: CPT

## 2023-06-04 PROCEDURE — 87086 URINE CULTURE/COLONY COUNT: CPT

## 2023-06-04 PROCEDURE — 80053 COMPREHEN METABOLIC PANEL: CPT

## 2023-06-04 PROCEDURE — 82330 ASSAY OF CALCIUM: CPT

## 2023-06-04 PROCEDURE — 70450 CT HEAD/BRAIN W/O DYE: CPT | Mod: MA

## 2023-06-04 PROCEDURE — 93005 ELECTROCARDIOGRAM TRACING: CPT

## 2023-06-04 PROCEDURE — 71045 X-RAY EXAM CHEST 1 VIEW: CPT

## 2023-06-04 PROCEDURE — 87077 CULTURE AEROBIC IDENTIFY: CPT

## 2023-06-04 PROCEDURE — 99284 EMERGENCY DEPT VISIT MOD MDM: CPT

## 2023-06-04 PROCEDURE — 70450 CT HEAD/BRAIN W/O DYE: CPT | Mod: 26,MA

## 2023-06-04 PROCEDURE — 85014 HEMATOCRIT: CPT

## 2023-06-04 PROCEDURE — 99285 EMERGENCY DEPT VISIT HI MDM: CPT | Mod: 25

## 2023-06-04 PROCEDURE — 84484 ASSAY OF TROPONIN QUANT: CPT

## 2023-06-04 PROCEDURE — 83605 ASSAY OF LACTIC ACID: CPT

## 2023-06-04 PROCEDURE — 82962 GLUCOSE BLOOD TEST: CPT

## 2023-06-04 PROCEDURE — 81001 URINALYSIS AUTO W/SCOPE: CPT

## 2023-06-04 PROCEDURE — 85018 HEMOGLOBIN: CPT

## 2023-06-04 PROCEDURE — 85730 THROMBOPLASTIN TIME PARTIAL: CPT

## 2023-06-04 PROCEDURE — 84295 ASSAY OF SERUM SODIUM: CPT

## 2023-06-04 RX ORDER — CEFPODOXIME PROXETIL 100 MG
1 TABLET ORAL
Qty: 20 | Refills: 0
Start: 2023-06-04 | End: 2023-06-13

## 2023-06-04 RX ORDER — SODIUM CHLORIDE 9 MG/ML
1000 INJECTION INTRAMUSCULAR; INTRAVENOUS; SUBCUTANEOUS ONCE
Refills: 0 | Status: COMPLETED | OUTPATIENT
Start: 2023-06-04 | End: 2023-06-04

## 2023-06-04 RX ORDER — MECLIZINE HCL 12.5 MG
50 TABLET ORAL ONCE
Refills: 0 | Status: COMPLETED | OUTPATIENT
Start: 2023-06-04 | End: 2023-06-04

## 2023-06-04 RX ORDER — ACETAMINOPHEN 500 MG
650 TABLET ORAL ONCE
Refills: 0 | Status: COMPLETED | OUTPATIENT
Start: 2023-06-04 | End: 2023-06-04

## 2023-06-04 RX ADMIN — Medication 650 MILLIGRAM(S): at 17:48

## 2023-06-04 RX ADMIN — SODIUM CHLORIDE 1000 MILLILITER(S): 9 INJECTION INTRAMUSCULAR; INTRAVENOUS; SUBCUTANEOUS at 17:48

## 2023-06-04 RX ADMIN — Medication 50 MILLIGRAM(S): at 17:48

## 2023-06-04 NOTE — ED ADULT TRIAGE NOTE - PATIENT ON (OXYGEN DELIVERY METHOD)
Requested Prescriptions     Pending Prescriptions Disp Refills    albuterol sulfate  (90 Base) MCG/ACT inhaler       Sig: Inhale 4 puffs into the lungs every 4 hours as needed for Wheezing   Patient requesting a medication refill. Pharmacy: [facility]    Has not been seen since 2019. Needs an appointment.
room air

## 2023-06-04 NOTE — ED PROVIDER NOTE - PHYSICAL EXAMINATION
CONSTITUTIONAL: Well-appearing; well-nourished; in no apparent distress.   EYES: PERRL; EOM intact. + horizontal nystagmus  ENT: normal nose; no rhinorrhea; normal pharynx with no tonsillar hypertrophy.   NECK: Supple; non-tender; no cervical lymphadenopathy.   CARDIOVASCULAR: Normal S1, S2; no murmurs, rubs, or gallops. Equal radial pulse  RESPIRATORY: Normal chest excursion with respiration; breath sounds clear and equal bilaterally; no wheezes, rhonchi, or rales.  GI/: Normal bowel sounds; non-distended; non-tender; no palpable organomegaly.   MS: No evidence of trauma or deformity. Normal ROM in all four extremities; non-tender to palpation; distal pulses are normal.   SKIN: Normal for age and race; warm; dry; good turgor; no apparent lesions or exudate.   NEURO/PSYCH: A & O x 4; grossly unremarkable. No focal deficits. No facial droop. No tongue deviation. Cerebellar intact. Normal gait. Sensation intact

## 2023-06-04 NOTE — ED PROVIDER NOTE - PATIENT PORTAL LINK FT
You can access the FollowMyHealth Patient Portal offered by [facility] by registering at the following website: [email or web address]. By joining [employer]’s FollowMyHealth portal, you will also be able to view your health information using other applications (apps) compatible with our system.

## 2023-06-04 NOTE — ED PROVIDER NOTE - OBJECTIVE STATEMENT
78-year-old male from home history of hypertension, hyperlipidemia, BPH, diabetes, PE no longer on Eliquis presenting to ER with 3 weeks of weakness and dizziness. Patient states symptoms are brought upon standing he feels dizzy room spinning sensation both off-balance feels like legs are going to give out on him. He denies any recent illness, fever, chills, cough, chest pain, shortness of breath, headache, neck pain, abdominal pain, vomiting, diarrhea, urinary symptoms, recent trauma injuries or falls. No tobacco use.

## 2023-06-04 NOTE — ED PROVIDER NOTE - NSFOLLOWUPINSTRUCTIONS_ED_ALL_ED_FT
Urinary Tract Infection    A urinary tract infection (UTI) is an infection of any part of the urinary tract, which includes the kidneys, ureters, bladder, and urethra. Risk factors include ignoring your need to urinate, wiping back to front if female, being an uncircumcised male, and having diabetes or a weak immune system. Symptoms include frequent urination, pain or burning with urination, foul smelling urine, cloudy urine, pain in the lower abdomen, blood in the urine, and fever. If you were prescribed an antibiotic medicine, take it as told by your health care provider. Do not stop taking the antibiotic even if you start to feel better.    SEEK IMMEDIATE MEDICAL CARE IF YOU HAVE ANY OF THE FOLLOWING SYMPTOMS: severe back or abdominal pain, fever, inability to keep fluids or medicine down, dizziness/lightheadedness, or a change in mental status.    Weakness    Weakness is a lack of strength. It may be felt all over the body (generalized) or in one specific part of the body (focal). Some causes of weakness can be serious. You may need further medical evaluation, especially if you are elderly or you have a history of immunosuppression (such as chemotherapy or HIV), kidney disease, heart disease, or diabetes.     CAUSES  Weakness can be caused by many different things, including:    Infection.  Physical exhaustion.  Internal bleeding or other blood loss that results in a lack of red blood cells (anemia).  Dehydration. This cause is more common in elderly people.  Side effects or electrolyte abnormalities from medicines, such as pain medicines or sedatives.  Emotional distress, anxiety, or depression.  Circulation problems, especially severe peripheral arterial disease.  Heart disease, such as rapid atrial fibrillation, bradycardia, or heart failure.  Nervous system disorders, such as Guillain-Barré syndrome, multiple sclerosis, or stroke.    DIAGNOSIS  To find the cause of your weakness, your caregiver will take your history and perform a physical exam. Lab tests or X-rays may also be ordered, if needed.    TREATMENT  Treatment of weakness depends on the cause of your symptoms and can vary greatly.    HOME CARE INSTRUCTIONS  Rest as needed.  Eat a well-balanced diet.  Try to get some exercise every day.  Only take over-the-counter or prescription medicines as directed by your caregiver.    SEEK MEDICAL CARE IF:  Your weakness seems to be getting worse or spreads to other parts of your body.  You develop new aches or pains.    SEEK IMMEDIATE MEDICAL CARE IF:  You cannot perform your normal daily activities, such as getting dressed and feeding yourself.  You cannot walk up and down stairs, or you feel exhausted when you do so.  You have shortness of breath or chest pain.  You have difficulty moving parts of your body.   You have weakness in only one area of the body or on only one side of the body.  You have a fever.  You have trouble speaking or swallowing.  You cannot control your bladder or bowel movements.  You have black or bloody vomit or stools.    MAKE SURE YOU:  Understand these instructions.  Will watch your condition.  Will get help right away if you are not doing well or get worse.    ADDITIONAL NOTES AND INSTRUCTIONS    Please follow up with your Primary MD in 24-48 hr.  Seek immediate medical care for any new/worsening signs or symptoms.     Please follow up with your primary care doctor within one week.

## 2023-06-04 NOTE — ED PROVIDER NOTE - ATTENDING APP SHARED VISIT CONTRIBUTION OF CARE
Patient with vertigo, positional component., able to ambulate without assistance, cerebellar testing normal in the ED.  -plan is workup, supportive care and reassess.

## 2023-06-21 ENCOUNTER — APPOINTMENT (OUTPATIENT)
Dept: GERIATRICS | Facility: CLINIC | Age: 78
End: 2023-06-21
Payer: MEDICAID

## 2023-06-21 ENCOUNTER — OUTPATIENT (OUTPATIENT)
Dept: OUTPATIENT SERVICES | Facility: HOSPITAL | Age: 78
LOS: 1 days | End: 2023-06-21
Payer: MEDICAID

## 2023-06-21 VITALS
HEIGHT: 63 IN | DIASTOLIC BLOOD PRESSURE: 80 MMHG | SYSTOLIC BLOOD PRESSURE: 151 MMHG | OXYGEN SATURATION: 99 % | TEMPERATURE: 98 F | HEART RATE: 85 BPM | WEIGHT: 208 LBS | BODY MASS INDEX: 36.86 KG/M2

## 2023-06-21 DIAGNOSIS — Z00.00 ENCOUNTER FOR GENERAL ADULT MEDICAL EXAMINATION WITHOUT ABNORMAL FINDINGS: ICD-10-CM

## 2023-06-21 PROCEDURE — 99214 OFFICE O/P EST MOD 30 MIN: CPT

## 2023-06-21 NOTE — END OF VISIT
[] : Resident [FreeTextEntry3] : seen with loco team\par 1. IN eD FOR C/O WEKNESS WITH AMBULATION; ct BRAIN, cxr AND LABS BENIGN; DID HAVE uti AND GIVEN 10D OF ab - COMPLETED YESTERDAY\par pLAN: \par 1. next week for labs and UA/UC\par 2. refer to uro\par 3. echo and carotid US bilat to w/u CV risks\par Redirect w/u based upon results\par RTC 3 months.

## 2023-06-21 NOTE — PHYSICAL EXAM
[Alert] : alert [No Acute Distress] : in no acute distress [No Respiratory Distress] : no respiratory distress [No Acc Muscle Use] : no accessory muscle use [Normal S1, S2] : normal S1 and S2 [Murmurs] : no murmurs [Heart Rate And Rhythm] : heart rate was normal and rhythm regular [Oriented To Time, Place, And Person] : oriented to person, place, and time [Normal Affect] : the affect was normal [Normal Mood] : the mood was normal

## 2023-06-21 NOTE — ASSESSMENT
[FreeTextEntry1] : Weakness\par Unexplained weakness during ambulation, low suspicion for malignancy - no weight loss, constitutional sx. Further w/u required due to patient age and safety risk\par -ordering TTE and Carotid US\par \par UTI\par Recent treated UTI and hx of UTI sx for one year, repeat UA and Ucx and will need to see urology\par -ordering UA and UCx\par -referring to urology\par \par HTN\par -c/w amlodipine and lisinopril\par \par DM2\par -ordering A1c\par -c/w metformin\par \par HLD\par -ordering lipid profile, CBC, and CMP\par -c/w atorvastatin\par

## 2023-06-21 NOTE — REVIEW OF SYSTEMS
[As Noted in HPI] : as noted in HPI [Fever] : no fever [Chills] : no chills [Recent Weight Loss (___ Lbs)] : no recent weight loss [Chest Pain] : no chest pain [Palpitations] : no palpitations [Cough] : no cough [SOB on Exertion] : no shortness of breath during exertion

## 2023-06-21 NOTE — HISTORY OF PRESENT ILLNESS
[FreeTextEntry1] : Patient reports weakness and dizziness upon ambulation for one month which began during recent visit to [facility]. Patient was seen in the ED for weakness two weeks ago, no significant findings on CXR and CT head, and he was d/c'ed on supportive care. No f/c, cough, SOB, CP, weight loss. \par \par Patient reports dysuria and foul-smelling urine for one year. He was given abx during recent ED visit. Has not seen a urologist.\par \par Patient reports he has not been taking atorvastatin.

## 2023-06-22 ENCOUNTER — NON-APPOINTMENT (OUTPATIENT)
Age: 78
End: 2023-06-22

## 2023-06-28 ENCOUNTER — OUTPATIENT (OUTPATIENT)
Dept: OUTPATIENT SERVICES | Facility: HOSPITAL | Age: 78
LOS: 1 days | End: 2023-06-28

## 2023-06-28 DIAGNOSIS — Z00.00 ENCOUNTER FOR GENERAL ADULT MEDICAL EXAMINATION WITHOUT ABNORMAL FINDINGS: ICD-10-CM

## 2023-06-29 ENCOUNTER — APPOINTMENT (OUTPATIENT)
Dept: OPHTHALMOLOGY | Facility: CLINIC | Age: 78
End: 2023-06-29
Payer: MEDICAID

## 2023-06-29 ENCOUNTER — OUTPATIENT (OUTPATIENT)
Dept: OUTPATIENT SERVICES | Facility: HOSPITAL | Age: 78
LOS: 1 days | End: 2023-06-29
Payer: MEDICAID

## 2023-06-29 DIAGNOSIS — H53.8 OTHER VISUAL DISTURBANCES: ICD-10-CM

## 2023-06-29 DIAGNOSIS — Z00.00 ENCOUNTER FOR GENERAL ADULT MEDICAL EXAMINATION WITHOUT ABNORMAL FINDINGS: ICD-10-CM

## 2023-06-29 DIAGNOSIS — H40.1232 LOW-TENSION GLAUCOMA, BILATERAL, MODERATE STAGE: ICD-10-CM

## 2023-06-29 PROCEDURE — 92083 EXTENDED VISUAL FIELD XM: CPT | Mod: 26

## 2023-06-29 PROCEDURE — 92083 EXTENDED VISUAL FIELD XM: CPT

## 2023-07-03 DIAGNOSIS — E11.9 TYPE 2 DIABETES MELLITUS WITHOUT COMPLICATIONS: ICD-10-CM

## 2023-07-03 DIAGNOSIS — I10 ESSENTIAL (PRIMARY) HYPERTENSION: ICD-10-CM

## 2023-07-03 DIAGNOSIS — I51.7 CARDIOMEGALY: ICD-10-CM

## 2023-07-03 DIAGNOSIS — F32.A DEPRESSION, UNSPECIFIED: ICD-10-CM

## 2023-07-03 DIAGNOSIS — N40.0 BENIGN PROSTATIC HYPERPLASIA WITHOUT LOWER URINARY TRACT SYMPTOMS: ICD-10-CM

## 2023-07-07 NOTE — ED PROVIDER NOTE - NS_EDPROVIDERDISPOUSERTYPE_ED_A_ED
Anabell please advise, I don't see an order for orthotics- do you recommend this for him?   Please inform Dx- Thank you!   Attending Attestation (For Attendings USE Only)...

## 2023-07-12 ENCOUNTER — APPOINTMENT (OUTPATIENT)
Dept: OPHTHALMOLOGY | Facility: CLINIC | Age: 78
End: 2023-07-12
Payer: MEDICAID

## 2023-07-12 ENCOUNTER — OUTPATIENT (OUTPATIENT)
Dept: OUTPATIENT SERVICES | Facility: HOSPITAL | Age: 78
LOS: 1 days | End: 2023-07-12
Payer: MEDICAID

## 2023-07-12 DIAGNOSIS — H53.8 OTHER VISUAL DISTURBANCES: ICD-10-CM

## 2023-07-12 PROCEDURE — 92133 CPTRZD OPH DX IMG PST SGM ON: CPT

## 2023-07-12 PROCEDURE — 92014 COMPRE OPH EXAM EST PT 1/>: CPT

## 2023-07-12 PROCEDURE — 92133 CPTRZD OPH DX IMG PST SGM ON: CPT | Mod: 26

## 2023-07-19 DIAGNOSIS — H35.033 HYPERTENSIVE RETINOPATHY, BILATERAL: ICD-10-CM

## 2023-07-19 DIAGNOSIS — E11.9 TYPE 2 DIABETES MELLITUS WITHOUT COMPLICATIONS: ICD-10-CM

## 2023-07-19 DIAGNOSIS — H40.1232 LOW-TENSION GLAUCOMA, BILATERAL, MODERATE STAGE: ICD-10-CM

## 2023-07-19 DIAGNOSIS — H25.813 COMBINED FORMS OF AGE-RELATED CATARACT, BILATERAL: ICD-10-CM

## 2023-07-26 NOTE — ED ADULT NURSE NOTE - NSIMPLEMENTINTERV_GEN_ALL_ED
Patient scheduled for CT CAC scan and indicated they have a signed MD's order.   Called to verify patient has in their possession.     VM left for patient to call back to confirm order or the need for an order to be entered.    Implemented All Fall with Harm Risk Interventions:  [place] to call system. Call bell, personal items and telephone within reach. Instruct patient to call for assistance. Room bathroom lighting operational. Non-slip footwear when patient is off stretcher. Physically safe environment: no spills, clutter or unnecessary equipment. Stretcher in lowest position, wheels locked, appropriate side rails in place. Provide visual cue, wrist band, yellow gown, etc. Monitor gait and stability. Monitor for mental status changes and reorient to person, place, and time. Review medications for side effects contributing to fall risk. Reinforce activity limits and safety measures with patient and family. Provide visual clues: red socks.

## 2023-08-10 NOTE — ED ADULT TRIAGE NOTE - NS ED NURSE BANDS TYPE
Last office visit date: 07/26/23    Next appointment scheduled?: Yes     Number of refills given: 0    
Name band;
26-Sep-2021 16:28

## 2023-08-24 LAB
ALBUMIN SERPL ELPH-MCNC: 4.4 G/DL
ALP BLD-CCNC: 154 U/L
ALT SERPL-CCNC: 31 U/L
ANION GAP SERPL CALC-SCNC: 15 MMOL/L
AST SERPL-CCNC: 26 U/L
BILIRUB SERPL-MCNC: 0.4 MG/DL
BUN SERPL-MCNC: 13 MG/DL
CALCIUM SERPL-MCNC: 9.5 MG/DL
CHLORIDE SERPL-SCNC: 100 MMOL/L
CHOLEST SERPL-MCNC: 151 MG/DL
CO2 SERPL-SCNC: 21 MMOL/L
CREAT SERPL-MCNC: 1 MG/DL
EGFR: 78 ML/MIN/1.73M2
ESTIMATED AVERAGE GLUCOSE: 174 MG/DL
GLUCOSE SERPL-MCNC: 210 MG/DL
HBA1C MFR BLD HPLC: 7.7 %
HDLC SERPL-MCNC: 55 MG/DL
LDLC SERPL CALC-MCNC: 75 MG/DL
NONHDLC SERPL-MCNC: 96 MG/DL
POTASSIUM SERPL-SCNC: 4 MMOL/L
PROT SERPL-MCNC: 7.1 G/DL
SODIUM SERPL-SCNC: 136 MMOL/L
TRIGL SERPL-MCNC: 107 MG/DL

## 2023-09-07 ENCOUNTER — APPOINTMENT (OUTPATIENT)
Dept: UROLOGY | Facility: CLINIC | Age: 78
End: 2023-09-07

## 2023-09-07 VITALS
TEMPERATURE: 97.9 F | DIASTOLIC BLOOD PRESSURE: 77 MMHG | OXYGEN SATURATION: 97 % | WEIGHT: 202 LBS | HEART RATE: 75 BPM | BODY MASS INDEX: 35.79 KG/M2 | HEIGHT: 63 IN | SYSTOLIC BLOOD PRESSURE: 151 MMHG

## 2023-09-28 ENCOUNTER — APPOINTMENT (OUTPATIENT)
Dept: GERIATRICS | Facility: CLINIC | Age: 78
End: 2023-09-28
Payer: MEDICAID

## 2023-09-28 ENCOUNTER — OUTPATIENT (OUTPATIENT)
Dept: OUTPATIENT SERVICES | Facility: HOSPITAL | Age: 78
LOS: 1 days | End: 2023-09-28
Payer: MEDICAID

## 2023-09-28 VITALS
BODY MASS INDEX: 30.31 KG/M2 | SYSTOLIC BLOOD PRESSURE: 134 MMHG | HEART RATE: 69 BPM | HEIGHT: 68 IN | DIASTOLIC BLOOD PRESSURE: 78 MMHG | OXYGEN SATURATION: 99 % | WEIGHT: 200 LBS | TEMPERATURE: 97.9 F

## 2023-09-28 DIAGNOSIS — N40.0 BENIGN PROSTATIC HYPERPLASIA WITHOUT LOWER URINARY TRACT SYMPTOMS: ICD-10-CM

## 2023-09-28 DIAGNOSIS — E11.9 TYPE 2 DIABETES MELLITUS WITHOUT COMPLICATIONS: ICD-10-CM

## 2023-09-28 DIAGNOSIS — E78.5 HYPERLIPIDEMIA, UNSPECIFIED: ICD-10-CM

## 2023-09-28 DIAGNOSIS — I10 ESSENTIAL (PRIMARY) HYPERTENSION: ICD-10-CM

## 2023-09-28 DIAGNOSIS — Z00.00 ENCOUNTER FOR GENERAL ADULT MEDICAL EXAMINATION WITHOUT ABNORMAL FINDINGS: ICD-10-CM

## 2023-09-28 DIAGNOSIS — M25.559 PAIN IN UNSPECIFIED HIP: ICD-10-CM

## 2023-09-28 PROCEDURE — 99214 OFFICE O/P EST MOD 30 MIN: CPT

## 2023-09-28 PROCEDURE — 90686 IIV4 VACC NO PRSV 0.5 ML IM: CPT

## 2023-09-28 RX ORDER — METFORMIN ER 500 MG 500 MG/1
500 TABLET ORAL DAILY
Qty: 30 | Refills: 5 | Status: DISCONTINUED | COMMUNITY
Start: 2021-06-23 | End: 2023-09-28

## 2023-10-13 ENCOUNTER — OUTPATIENT (OUTPATIENT)
Dept: OUTPATIENT SERVICES | Facility: HOSPITAL | Age: 78
LOS: 1 days | End: 2023-10-13
Payer: MEDICAID

## 2023-10-13 ENCOUNTER — RESULT REVIEW (OUTPATIENT)
Age: 78
End: 2023-10-13

## 2023-10-13 DIAGNOSIS — M25.551 PAIN IN RIGHT HIP: ICD-10-CM

## 2023-10-13 PROCEDURE — 73502 X-RAY EXAM HIP UNI 2-3 VIEWS: CPT | Mod: RT

## 2023-10-13 PROCEDURE — 73502 X-RAY EXAM HIP UNI 2-3 VIEWS: CPT | Mod: 26,RT

## 2023-10-14 DIAGNOSIS — M25.551 PAIN IN RIGHT HIP: ICD-10-CM

## 2023-10-19 ENCOUNTER — RX RENEWAL (OUTPATIENT)
Age: 78
End: 2023-10-19

## 2023-12-22 ENCOUNTER — OUTPATIENT (OUTPATIENT)
Dept: OUTPATIENT SERVICES | Facility: HOSPITAL | Age: 78
LOS: 1 days | End: 2023-12-22
Payer: MEDICAID

## 2023-12-22 ENCOUNTER — APPOINTMENT (OUTPATIENT)
Dept: INTERNAL MEDICINE | Facility: CLINIC | Age: 78
End: 2023-12-22

## 2023-12-22 VITALS
HEART RATE: 71 BPM | SYSTOLIC BLOOD PRESSURE: 130 MMHG | HEIGHT: 68 IN | OXYGEN SATURATION: 100 % | BODY MASS INDEX: 30.46 KG/M2 | TEMPERATURE: 98.3 F | WEIGHT: 201 LBS | DIASTOLIC BLOOD PRESSURE: 79 MMHG

## 2023-12-22 DIAGNOSIS — Z00.00 ENCOUNTER FOR GENERAL ADULT MEDICAL EXAMINATION WITHOUT ABNORMAL FINDINGS: ICD-10-CM

## 2023-12-22 PROCEDURE — 99214 OFFICE O/P EST MOD 30 MIN: CPT

## 2023-12-22 NOTE — PHYSICAL EXAM
[No Acute Distress] : no acute distress [Well Nourished] : well nourished [Normal Sclera/Conjunctiva] : normal sclera/conjunctiva [PERRL] : pupils equal round and reactive to light [Normal Outer Ear/Nose] : the outer ears and nose were normal in appearance [Normal Oropharynx] : the oropharynx was normal [No Respiratory Distress] : no respiratory distress  [No Accessory Muscle Use] : no accessory muscle use [Clear to Auscultation] : lungs were clear to auscultation bilaterally [Normal Rate] : normal rate  [Regular Rhythm] : with a regular rhythm [Normal S1, S2] : normal S1 and S2 [No Edema] : there was no peripheral edema [Soft] : abdomen soft [Non-distended] : non-distended

## 2023-12-22 NOTE — HISTORY OF PRESENT ILLNESS
[FreeTextEntry1] : f/u RT hip and leg pain [de-identified] : 79 yo M PMH HTN DM HLD presents for follow up.  Pt notes since his last visit, he still has RT hip pain, RT lateral leg pain. Using the voltaren gel gave great relief and wants to continue. He never got the wheelchair PT referral or the roller/ walker so he would like to get those referrals. He lives in a duplex/ 2 floor house with his family so prefers roller/ walker. Also needs a blood pressure machine.  SInce last visit, pt did not get the carotid US or TTE yet and notes he still wants to follow up with those because he gets chest pain randomly, would resolve after drinking a lot of water. Denies any chest pain or shortness of breath currently.  Additionally states that he wants his medication sent to the same pharmacy ([facility], [address] [place] [address] , voice: [phone], fax: [phone])   Otherwise no other complaints.

## 2023-12-22 NOTE — REVIEW OF SYSTEMS
[Joint Pain] : joint pain [Back Pain] : back pain [Negative] : Genitourinary [Chest Pain] : no chest pain [Shortness Of Breath] : no shortness of breath [Cough] : no cough

## 2023-12-26 DIAGNOSIS — I10 ESSENTIAL (PRIMARY) HYPERTENSION: ICD-10-CM

## 2023-12-26 DIAGNOSIS — R07.9 CHEST PAIN, UNSPECIFIED: ICD-10-CM

## 2023-12-26 DIAGNOSIS — E11.9 TYPE 2 DIABETES MELLITUS WITHOUT COMPLICATIONS: ICD-10-CM

## 2023-12-26 DIAGNOSIS — E78.5 HYPERLIPIDEMIA, UNSPECIFIED: ICD-10-CM

## 2023-12-26 DIAGNOSIS — M25.559 PAIN IN UNSPECIFIED HIP: ICD-10-CM

## 2024-01-16 ENCOUNTER — APPOINTMENT (OUTPATIENT)
Dept: OPHTHALMOLOGY | Facility: CLINIC | Age: 79
End: 2024-01-16
Payer: MEDICAID

## 2024-01-16 ENCOUNTER — OUTPATIENT (OUTPATIENT)
Dept: OUTPATIENT SERVICES | Facility: HOSPITAL | Age: 79
LOS: 1 days | End: 2024-01-16
Payer: MEDICAID

## 2024-01-16 DIAGNOSIS — H35.033 HYPERTENSIVE RETINOPATHY, BILATERAL: ICD-10-CM

## 2024-01-16 DIAGNOSIS — H53.8 OTHER VISUAL DISTURBANCES: ICD-10-CM

## 2024-01-16 DIAGNOSIS — E11.9 TYPE 2 DIABETES MELLITUS WITHOUT COMPLICATIONS: ICD-10-CM

## 2024-01-16 DIAGNOSIS — H40.1232 LOW-TENSION GLAUCOMA, BILATERAL, MODERATE STAGE: ICD-10-CM

## 2024-01-16 DIAGNOSIS — H25.813 COMBINED FORMS OF AGE-RELATED CATARACT, BILATERAL: ICD-10-CM

## 2024-01-16 PROCEDURE — 92012 INTRM OPH EXAM EST PATIENT: CPT

## 2024-02-27 ENCOUNTER — OUTPATIENT (OUTPATIENT)
Dept: OUTPATIENT SERVICES | Facility: HOSPITAL | Age: 79
LOS: 1 days | End: 2024-02-27
Payer: MEDICAID

## 2024-02-27 DIAGNOSIS — M19.90 UNSPECIFIED OSTEOARTHRITIS, UNSPECIFIED SITE: ICD-10-CM

## 2024-02-27 PROCEDURE — 97161 PT EVAL LOW COMPLEX 20 MIN: CPT | Mod: GP

## 2024-02-28 DIAGNOSIS — M19.90 UNSPECIFIED OSTEOARTHRITIS, UNSPECIFIED SITE: ICD-10-CM

## 2024-03-04 ENCOUNTER — OUTPATIENT (OUTPATIENT)
Dept: OUTPATIENT SERVICES | Facility: HOSPITAL | Age: 79
LOS: 1 days | End: 2024-03-04
Payer: MEDICAID

## 2024-03-04 DIAGNOSIS — M19.90 UNSPECIFIED OSTEOARTHRITIS, UNSPECIFIED SITE: ICD-10-CM

## 2024-03-04 PROCEDURE — 97110 THERAPEUTIC EXERCISES: CPT | Mod: GP

## 2024-03-05 DIAGNOSIS — M19.90 UNSPECIFIED OSTEOARTHRITIS, UNSPECIFIED SITE: ICD-10-CM

## 2024-03-06 ENCOUNTER — OUTPATIENT (OUTPATIENT)
Dept: OUTPATIENT SERVICES | Facility: HOSPITAL | Age: 79
LOS: 1 days | End: 2024-03-06

## 2024-03-06 DIAGNOSIS — M19.90 UNSPECIFIED OSTEOARTHRITIS, UNSPECIFIED SITE: ICD-10-CM

## 2024-03-11 ENCOUNTER — OUTPATIENT (OUTPATIENT)
Dept: OUTPATIENT SERVICES | Facility: HOSPITAL | Age: 79
LOS: 1 days | End: 2024-03-11

## 2024-03-11 DIAGNOSIS — M19.90 UNSPECIFIED OSTEOARTHRITIS, UNSPECIFIED SITE: ICD-10-CM

## 2024-03-13 ENCOUNTER — OUTPATIENT (OUTPATIENT)
Dept: OUTPATIENT SERVICES | Facility: HOSPITAL | Age: 79
LOS: 1 days | End: 2024-03-13

## 2024-03-13 DIAGNOSIS — M19.90 UNSPECIFIED OSTEOARTHRITIS, UNSPECIFIED SITE: ICD-10-CM

## 2024-03-18 ENCOUNTER — NON-APPOINTMENT (OUTPATIENT)
Age: 79
End: 2024-03-18

## 2024-03-19 ENCOUNTER — APPOINTMENT (OUTPATIENT)
Dept: INTERNAL MEDICINE | Facility: CLINIC | Age: 79
End: 2024-03-19

## 2024-03-19 ENCOUNTER — OUTPATIENT (OUTPATIENT)
Dept: OUTPATIENT SERVICES | Facility: HOSPITAL | Age: 79
LOS: 1 days | End: 2024-03-19

## 2024-03-19 DIAGNOSIS — Z00.00 ENCOUNTER FOR GENERAL ADULT MEDICAL EXAMINATION WITHOUT ABNORMAL FINDINGS: ICD-10-CM

## 2024-03-20 ENCOUNTER — EMERGENCY (EMERGENCY)
Facility: HOSPITAL | Age: 79
LOS: 0 days | Discharge: HOME | End: 2024-03-20
Attending: EMERGENCY MEDICINE
Payer: MEDICAID

## 2024-03-20 VITALS
SYSTOLIC BLOOD PRESSURE: 147 MMHG | DIASTOLIC BLOOD PRESSURE: 69 MMHG | HEART RATE: 18 BPM | HEIGHT: 68 IN | WEIGHT: 195.11 LBS | OXYGEN SATURATION: 100 % | TEMPERATURE: 98 F | RESPIRATION RATE: 74 BRPM

## 2024-03-20 VITALS
SYSTOLIC BLOOD PRESSURE: 184 MMHG | DIASTOLIC BLOOD PRESSURE: 70 MMHG | RESPIRATION RATE: 18 BRPM | HEART RATE: 70 BPM | TEMPERATURE: 98 F | OXYGEN SATURATION: 98 %

## 2024-03-20 DIAGNOSIS — R53.1 WEAKNESS: ICD-10-CM

## 2024-03-20 DIAGNOSIS — R53.83 OTHER FATIGUE: ICD-10-CM

## 2024-03-20 DIAGNOSIS — Z20.822 CONTACT WITH AND (SUSPECTED) EXPOSURE TO COVID-19: ICD-10-CM

## 2024-03-20 DIAGNOSIS — R09.81 NASAL CONGESTION: ICD-10-CM

## 2024-03-20 DIAGNOSIS — N40.0 BENIGN PROSTATIC HYPERPLASIA WITHOUT LOWER URINARY TRACT SYMPTOMS: ICD-10-CM

## 2024-03-20 DIAGNOSIS — Z88.6 ALLERGY STATUS TO ANALGESIC AGENT: ICD-10-CM

## 2024-03-20 DIAGNOSIS — B34.9 VIRAL INFECTION, UNSPECIFIED: ICD-10-CM

## 2024-03-20 DIAGNOSIS — E78.5 HYPERLIPIDEMIA, UNSPECIFIED: ICD-10-CM

## 2024-03-20 DIAGNOSIS — I10 ESSENTIAL (PRIMARY) HYPERTENSION: ICD-10-CM

## 2024-03-20 LAB
ALBUMIN SERPL ELPH-MCNC: 3.9 G/DL — SIGNIFICANT CHANGE UP (ref 3.5–5.2)
ALP SERPL-CCNC: 107 U/L — SIGNIFICANT CHANGE UP (ref 30–115)
ALT FLD-CCNC: 23 U/L — SIGNIFICANT CHANGE UP (ref 0–41)
ANION GAP SERPL CALC-SCNC: 11 MMOL/L — SIGNIFICANT CHANGE UP (ref 7–14)
AST SERPL-CCNC: 19 U/L — SIGNIFICANT CHANGE UP (ref 0–41)
BASOPHILS # BLD AUTO: 0.06 K/UL — SIGNIFICANT CHANGE UP (ref 0–0.2)
BASOPHILS NFR BLD AUTO: 0.9 % — SIGNIFICANT CHANGE UP (ref 0–1)
BILIRUB SERPL-MCNC: 0.3 MG/DL — SIGNIFICANT CHANGE UP (ref 0.2–1.2)
BUN SERPL-MCNC: 9 MG/DL — LOW (ref 10–20)
CALCIUM SERPL-MCNC: 9.4 MG/DL — SIGNIFICANT CHANGE UP (ref 8.4–10.4)
CHLORIDE SERPL-SCNC: 101 MMOL/L — SIGNIFICANT CHANGE UP (ref 98–110)
CO2 SERPL-SCNC: 22 MMOL/L — SIGNIFICANT CHANGE UP (ref 17–32)
CREAT SERPL-MCNC: 0.7 MG/DL — SIGNIFICANT CHANGE UP (ref 0.7–1.5)
EGFR: 95 ML/MIN/1.73M2 — SIGNIFICANT CHANGE UP
EOSINOPHIL # BLD AUTO: 0.37 K/UL — SIGNIFICANT CHANGE UP (ref 0–0.7)
EOSINOPHIL NFR BLD AUTO: 5.8 % — SIGNIFICANT CHANGE UP (ref 0–8)
FLUAV AG NPH QL: SIGNIFICANT CHANGE UP
FLUBV AG NPH QL: SIGNIFICANT CHANGE UP
GLUCOSE SERPL-MCNC: 105 MG/DL — HIGH (ref 70–99)
HCT VFR BLD CALC: 36.8 % — LOW (ref 42–52)
HGB BLD-MCNC: 12.1 G/DL — LOW (ref 14–18)
IMM GRANULOCYTES NFR BLD AUTO: 0.2 % — SIGNIFICANT CHANGE UP (ref 0.1–0.3)
LYMPHOCYTES # BLD AUTO: 2.23 K/UL — SIGNIFICANT CHANGE UP (ref 1.2–3.4)
LYMPHOCYTES # BLD AUTO: 34.8 % — SIGNIFICANT CHANGE UP (ref 20.5–51.1)
MCHC RBC-ENTMCNC: 26.1 PG — LOW (ref 27–31)
MCHC RBC-ENTMCNC: 32.9 G/DL — SIGNIFICANT CHANGE UP (ref 32–37)
MCV RBC AUTO: 79.3 FL — LOW (ref 80–94)
MONOCYTES # BLD AUTO: 0.64 K/UL — HIGH (ref 0.1–0.6)
MONOCYTES NFR BLD AUTO: 10 % — HIGH (ref 1.7–9.3)
NEUTROPHILS # BLD AUTO: 3.09 K/UL — SIGNIFICANT CHANGE UP (ref 1.4–6.5)
NEUTROPHILS NFR BLD AUTO: 48.3 % — SIGNIFICANT CHANGE UP (ref 42.2–75.2)
NRBC # BLD: 0 /100 WBCS — SIGNIFICANT CHANGE UP (ref 0–0)
NT-PROBNP SERPL-SCNC: <5 PG/ML — SIGNIFICANT CHANGE UP (ref 0–300)
PLATELET # BLD AUTO: 273 K/UL — SIGNIFICANT CHANGE UP (ref 130–400)
PMV BLD: 9.6 FL — SIGNIFICANT CHANGE UP (ref 7.4–10.4)
POTASSIUM SERPL-MCNC: 3.9 MMOL/L — SIGNIFICANT CHANGE UP (ref 3.5–5)
POTASSIUM SERPL-SCNC: 3.9 MMOL/L — SIGNIFICANT CHANGE UP (ref 3.5–5)
PROT SERPL-MCNC: 6.7 G/DL — SIGNIFICANT CHANGE UP (ref 6–8)
RBC # BLD: 4.64 M/UL — LOW (ref 4.7–6.1)
RBC # FLD: 13.2 % — SIGNIFICANT CHANGE UP (ref 11.5–14.5)
RSV RNA NPH QL NAA+NON-PROBE: SIGNIFICANT CHANGE UP
SARS-COV-2 RNA SPEC QL NAA+PROBE: SIGNIFICANT CHANGE UP
SODIUM SERPL-SCNC: 134 MMOL/L — LOW (ref 135–146)
TROPONIN T, HIGH SENSITIVITY RESULT: 7 NG/L — SIGNIFICANT CHANGE UP (ref 6–21)
TROPONIN T, HIGH SENSITIVITY RESULT: 7 NG/L — SIGNIFICANT CHANGE UP (ref 6–21)
WBC # BLD: 6.4 K/UL — SIGNIFICANT CHANGE UP (ref 4.8–10.8)
WBC # FLD AUTO: 6.4 K/UL — SIGNIFICANT CHANGE UP (ref 4.8–10.8)

## 2024-03-20 PROCEDURE — 71045 X-RAY EXAM CHEST 1 VIEW: CPT

## 2024-03-20 PROCEDURE — 0241U: CPT

## 2024-03-20 PROCEDURE — 84484 ASSAY OF TROPONIN QUANT: CPT | Mod: 59

## 2024-03-20 PROCEDURE — 93010 ELECTROCARDIOGRAM REPORT: CPT

## 2024-03-20 PROCEDURE — 36415 COLL VENOUS BLD VENIPUNCTURE: CPT

## 2024-03-20 PROCEDURE — 83880 ASSAY OF NATRIURETIC PEPTIDE: CPT

## 2024-03-20 PROCEDURE — 85025 COMPLETE CBC W/AUTO DIFF WBC: CPT

## 2024-03-20 PROCEDURE — 99285 EMERGENCY DEPT VISIT HI MDM: CPT | Mod: 25

## 2024-03-20 PROCEDURE — 80053 COMPREHEN METABOLIC PANEL: CPT

## 2024-03-20 PROCEDURE — 99285 EMERGENCY DEPT VISIT HI MDM: CPT

## 2024-03-20 PROCEDURE — 71045 X-RAY EXAM CHEST 1 VIEW: CPT | Mod: 26

## 2024-03-20 PROCEDURE — 93005 ELECTROCARDIOGRAM TRACING: CPT

## 2024-03-20 RX ORDER — SODIUM CHLORIDE 9 MG/ML
1000 INJECTION INTRAMUSCULAR; INTRAVENOUS; SUBCUTANEOUS ONCE
Refills: 0 | Status: COMPLETED | OUTPATIENT
Start: 2024-03-20 | End: 2024-03-20

## 2024-03-20 RX ADMIN — SODIUM CHLORIDE 500 MILLILITER(S): 9 INJECTION INTRAMUSCULAR; INTRAVENOUS; SUBCUTANEOUS at 18:59

## 2024-03-20 NOTE — ED PROVIDER NOTE - OBJECTIVE STATEMENT
78 year old male with a history of HTN, HLD, BPH presents to the ED with weakness. Patient reports 3 days of generalized fatigue, sinus congestion, and clear nasal discharge. He is experiencing some dyspnea exertion and lightheadedness at times. Denies headache, vision changes, paresthesias, weakness, fever, chills, chest pain, cough, abdominal pain, nausea, vomiting, diarrhea, constipation, dysuria, hematuria, lower extremity swelling, rash.

## 2024-03-20 NOTE — ED PROVIDER NOTE - CLINICAL SUMMARY MEDICAL DECISION MAKING FREE TEXT BOX
78-year-old man with history of HTN, HLD, BPH, in ER with c/o not feeling well for the past 3 days.  + Generalized weakness and fatigue, rhinorrhea/nasal congestion, body aches.  No cough.  Denies any chest pain.  Mild PEDERSEN.  No SOB at rest.  No abdominal pain.  No N/V/D.  No urinary symptoms.  No LE pain/swelling.  No HA, no syncope/near syncope.  PE - nad, nc/at, eomi, perrl, op - clear, mmm, neck supple, cta b/l, no w/r/r, rrr, abd- soft, nt/nd, nabs, from x 4, no LE swelling/tenderness, A&O x 3, cn 2-12 intact, no focal motor/sensory deficits.     -Labs reviewed: CBC/CMP unremarkable, BNP negative, HS trop 7 > 7 on repeat.  CXR negative.  Flu/COVID swab negative. EKG: NSR @67, no acute ischemic changes.  Results of labs and imaging discussed with patient.  Feels better after IVF.  Well-appearing in ER.  To DC home, patient follow-up with PMD.  Told to return to ER if he feels worse, or for any new/concerning symptoms.  Patient understands and agrees with plan.

## 2024-03-20 NOTE — ED ADULT NURSE NOTE - CAS ELECT INFOMATION PROVIDED
Cheek-To-Nose Interpolation Flap Text: A decision was made to reconstruct the defect utilizing an interpolation axial flap and a staged reconstruction.  A telfa template was made of the defect.  This telfa template was then used to outline the Cheek-To-Nose Interpolation flap.  The donor area for the pedicle flap was then injected with anesthesia.  The flap was excised through the skin and subcutaneous tissue down to the layer of the underlying musculature.  The interpolation flap was carefully excised within this deep plane to maintain its blood supply.  The edges of the donor site were undermined.   The donor site was closed in a primary fashion.  The pedicle was then rotated into position and sutured.  Once the tube was sutured into place, adequate blood supply was confirmed with blanching and refill.  The pedicle was then wrapped with xeroform gauze and dressed appropriately with a telfa and gauze bandage to ensure continued blood supply and protect the attached pedicle. DC instructions

## 2024-03-20 NOTE — ED ADULT NURSE NOTE - NSFALLCONCLUSION_ED_ALL_ED
Vital signs stable. Postpartum assessment WDL. Incision clean, dry, intact and open to air/with steri strips in place. Pain controlled with Ibuprofen and Tylenol. Patient ambulating with and voiding independently. Patient passing gas and has not had bowel movement. Pumping independently, parents visiting baby boy in NICU frequently. Will continue with current plan of care.     Universal Safety Interventions

## 2024-03-20 NOTE — ED PROVIDER NOTE - PATIENT PORTAL LINK FT
You can access the FollowMyHealth Patient Portal offered by [facility] by registering at the following website: [email or web address]. By joining [employer]’s FollowMyHealth portal, you will also be able to view your health information using other applications (apps) compatible with our system.

## 2024-03-20 NOTE — ED PROVIDER NOTE - PHYSICAL EXAMINATION
Physical Exam    Constitutional: No acute distress.   Eyes: Conjunctiva pink, Sclera clear, PERRLA, EOMI.  ENT: No sinus tenderness. clear nasal discharge. No oropharyngeal erythema, edema, or exudates. Uvula midline.   Cardiovascular: Regular rate, regular rhythm. No noted murmurs rubs or gallops.  Respiratory: unlabored respiratory effort, clear to auscultation bilaterally no wheezing, rales or rhonchi  Gastrointestinal: Normal bowel sounds. soft, non distended, non-tender abdomen.   Musculoskeletal: supple neck, no midline tenderness. No joint or bony deformity.   Integumentary: warm, dry, no rash  Neurologic: awake, alert, cranial nerves II-XII grossly intact, extremities’ motor and sensory functions grossly intact

## 2024-03-20 NOTE — ED ADULT NURSE NOTE - OBJECTIVE STATEMENT
Pt feeling congested, frequently sneezing and having some weakness for the past 3 days. Occasional SOB. Denied any Cough, CP, dizziness, fever/chills, body aches, pedal edema, urinary sx or ane other complaints. SKin pwd, resp even non labor

## 2024-03-20 NOTE — ED ADULT NURSE NOTE - NSFALLUNIVINTERV_ED_ALL_ED
Bed/Stretcher in lowest position, wheels locked, appropriate side rails in place/Call bell, personal items and telephone in reach/Instruct patient to call for assistance before getting out of bed/chair/stretcher/Non-slip footwear applied when patient is off stretcher/White to call system/Physically safe environment - no spills, clutter or unnecessary equipment/Purposeful proactive rounding/Room/bathroom lighting operational, light cord in reach

## 2024-03-20 NOTE — ED PROVIDER NOTE - NSFOLLOWUPINSTRUCTIONS_ED_ALL_ED_FT
Please follow up with your primary care provider 3-5 days if you have new, worsening or persistent symptoms. Please return to the ED if you develop worsening shortness of breath or if you have chest pain.       VIRAL SYNDROME - Discharge Care     Viral Syndrome    WHAT YOU NEED TO KNOW:    Viral syndrome is a term used for symptoms of an infection caused by a virus. Viruses are spread easily from person to person through the air and on shared items. An illness caused by a virus usually goes away in 10 to 14 days without treatment. Antibiotics are not given for a viral infection.     DISCHARGE INSTRUCTIONS:    Call 911 for the following:     You have a seizure.       You cannot be woken.       You have chest pain or trouble breathing.     Seek care immediately if:     You have a stiff neck, a bad headache, and sensitivity to light.       You feel weak, dizzy, or confused.       You stop urinating or urinate a lot less than normal.       You cough up blood or thick, yellow or green, mucus.       You have severe abdominal pain or your abdomen is larger than usual.     Contact your healthcare provider if:     Your symptoms do not get better with treatment, or get worse, after 3 days.       You have a rash or ear pain.       You have burning when you urinate.       You have questions or concerns about your condition or care.    Medicines: You may need any of the following:     Acetaminophen decreases pain and fever. It is available without a doctor's order. Ask how much medicine to take and how often to take it. Follow directions. Acetaminophen can cause liver damage if not taken correctly.       NSAIDs, such as ibuprofen, help decrease swelling, pain, and fever. NSAIDs can cause stomach bleeding or kidney problems in certain people. If you take blood thinner medicine, always ask your healthcare provider if NSAIDs are safe for you. Always read the medicine label and follow directions.      Cold medicine helps decrease swelling, control a cough, and relieve chest or nasal congestion.       Saline nasal spray helps decrease nasal congestion.       Take your medicine as directed. Contact your healthcare provider if you think your medicine is not helping or if you have side effects. Tell him of her if you are allergic to any medicine. Keep a list of the medicines, vitamins, and herbs you take. Include the amounts, and when and why you take them. Bring the list or the pill bottles to follow-up visits. Carry your medicine list with you in case of an emergency.    Manage your symptoms:     Drink liquids as directed to prevent dehydration. Ask how much liquid to drink each day and which liquids are best for you. Ask if you should drink an oral rehydration solution (ORS). An ORS has the right amounts of water, salts, and sugar you need to replace body fluids. This may help prevent dehydration caused by vomiting or diarrhea. Do not drink liquids with caffeine. Drinks with caffeine can make dehydration worse.       Get plenty of rest to help your body heal. Take naps throughout the day. Ask your healthcare provider when you can return to work and your normal activities.       Use a cool mist humidifier to help you breathe easier if you have nasal or chest congestion. Ask your healthcare provider how to use a cool mist humidifier.       Eat honey or use cough drops to help decrease throat discomfort. Ask your healthcare provider how much honey you should eat each day. Cough drops are available without a doctor's order. Follow directions for taking cough drops.       Do not smoke and stay away from others who smoke. Nicotine and other chemicals in cigarettes and cigars can cause lung damage. Smoking can also delay healing. Ask your healthcare provider for information if you currently smoke and need help to quit. E-cigarettes or smokeless tobacco still contain nicotine. Talk to your healthcare provider before you use these products.       Wash your hands frequently to prevent the spread of germs to others. Use soap and water. Use gel hand  when soap and water are not available. Wash your hands after you use the bathroom, cough, or sneeze. Wash your hands before you prepare or eat food.     Follow up with your healthcare provider as directed: Write down your questions so you remember to ask them during your visits.

## 2024-04-09 NOTE — ED PROVIDER NOTE - NS ED ATTENDING STATEMENT MOD
Pt records reviewed.  Pt will be referred to Hepatology due to HARMON CIRRHOSIS   Initial referral received  from Referring Provider:Tyron Villavicencio Jr., MD     TB, CR, NA, ALB, WNL. Not INR for MELD   Phone: [phone]   Fax: [phone]   Referral letter sent to patient.      RECORDS SCANNED IN MEDIA UNDER HEPATOLOGY REFERRAL .     This was a shared visit with the YARA. I reviewed and verified the documentation and independently performed the documented:

## 2024-04-10 ENCOUNTER — OUTPATIENT (OUTPATIENT)
Dept: OUTPATIENT SERVICES | Facility: HOSPITAL | Age: 79
LOS: 1 days | End: 2024-04-10
Payer: MEDICAID

## 2024-04-10 DIAGNOSIS — M19.90 UNSPECIFIED OSTEOARTHRITIS, UNSPECIFIED SITE: ICD-10-CM

## 2024-04-10 PROCEDURE — 97110 THERAPEUTIC EXERCISES: CPT | Mod: KX,GP

## 2024-04-11 DIAGNOSIS — M19.90 UNSPECIFIED OSTEOARTHRITIS, UNSPECIFIED SITE: ICD-10-CM

## 2024-04-12 ENCOUNTER — APPOINTMENT (OUTPATIENT)
Dept: INTERNAL MEDICINE | Facility: CLINIC | Age: 79
End: 2024-04-12

## 2024-04-15 ENCOUNTER — OUTPATIENT (OUTPATIENT)
Dept: OUTPATIENT SERVICES | Facility: HOSPITAL | Age: 79
LOS: 1 days | End: 2024-04-15

## 2024-04-15 DIAGNOSIS — M19.90 UNSPECIFIED OSTEOARTHRITIS, UNSPECIFIED SITE: ICD-10-CM

## 2024-04-15 PROCEDURE — 97110 THERAPEUTIC EXERCISES: CPT | Mod: GP

## 2024-04-17 ENCOUNTER — OUTPATIENT (OUTPATIENT)
Dept: OUTPATIENT SERVICES | Facility: HOSPITAL | Age: 79
LOS: 1 days | End: 2024-04-17

## 2024-04-17 DIAGNOSIS — M19.90 UNSPECIFIED OSTEOARTHRITIS, UNSPECIFIED SITE: ICD-10-CM

## 2024-04-22 ENCOUNTER — OUTPATIENT (OUTPATIENT)
Dept: OUTPATIENT SERVICES | Facility: HOSPITAL | Age: 79
LOS: 1 days | End: 2024-04-22

## 2024-04-22 DIAGNOSIS — M19.90 UNSPECIFIED OSTEOARTHRITIS, UNSPECIFIED SITE: ICD-10-CM

## 2024-04-22 NOTE — ASU PATIENT PROFILE, ADULT - BILL OF RIGHTS/ADMISSION INFORMATION PROVIDED TO:
LVM - rx sent to pharmacy     Barbi BAER RN  [facility]     Patient

## 2024-04-24 ENCOUNTER — OUTPATIENT (OUTPATIENT)
Dept: OUTPATIENT SERVICES | Facility: HOSPITAL | Age: 79
LOS: 1 days | End: 2024-04-24

## 2024-04-24 DIAGNOSIS — M19.90 UNSPECIFIED OSTEOARTHRITIS, UNSPECIFIED SITE: ICD-10-CM

## 2024-04-26 ENCOUNTER — OUTPATIENT (OUTPATIENT)
Dept: OUTPATIENT SERVICES | Facility: HOSPITAL | Age: 79
LOS: 1 days | End: 2024-04-26
Payer: MEDICAID

## 2024-04-26 ENCOUNTER — APPOINTMENT (OUTPATIENT)
Dept: INTERNAL MEDICINE | Facility: CLINIC | Age: 79
End: 2024-04-26

## 2024-04-26 ENCOUNTER — NON-APPOINTMENT (OUTPATIENT)
Age: 79
End: 2024-04-26

## 2024-04-26 VITALS
BODY MASS INDEX: 30.22 KG/M2 | OXYGEN SATURATION: 98 % | TEMPERATURE: 97.3 F | DIASTOLIC BLOOD PRESSURE: 60 MMHG | HEIGHT: 68 IN | HEART RATE: 86 BPM | WEIGHT: 199.44 LBS | SYSTOLIC BLOOD PRESSURE: 113 MMHG

## 2024-04-26 DIAGNOSIS — Z87.09 PERSONAL HISTORY OF OTHER DISEASES OF THE RESPIRATORY SYSTEM: ICD-10-CM

## 2024-04-26 DIAGNOSIS — Z87.898 PERSONAL HISTORY OF OTHER SPECIFIED CONDITIONS: ICD-10-CM

## 2024-04-26 DIAGNOSIS — Z86.711 PERSONAL HISTORY OF PULMONARY EMBOLISM: ICD-10-CM

## 2024-04-26 DIAGNOSIS — I82.409 ACUTE EMBOLISM AND THROMBOSIS OF UNSPECIFIED DEEP VEINS OF UNSPECIFIED LOWER EXTREMITY: ICD-10-CM

## 2024-04-26 DIAGNOSIS — G89.29 PAIN IN UNSPECIFIED HIP: ICD-10-CM

## 2024-04-26 DIAGNOSIS — Z00.00 ENCOUNTER FOR GENERAL ADULT MEDICAL EXAMINATION WITHOUT ABNORMAL FINDINGS: ICD-10-CM

## 2024-04-26 DIAGNOSIS — M79.89 OTHER SPECIFIED SOFT TISSUE DISORDERS: ICD-10-CM

## 2024-04-26 DIAGNOSIS — R07.9 CHEST PAIN, UNSPECIFIED: ICD-10-CM

## 2024-04-26 DIAGNOSIS — E11.9 TYPE 2 DIABETES MELLITUS W/OUT COMPLICATIONS: ICD-10-CM

## 2024-04-26 DIAGNOSIS — K59.01 SLOW TRANSIT CONSTIPATION: ICD-10-CM

## 2024-04-26 DIAGNOSIS — K21.9 GASTRO-ESOPHAGEAL REFLUX DISEASE W/OUT ESOPHAGITIS: ICD-10-CM

## 2024-04-26 DIAGNOSIS — N40.0 BENIGN PROSTATIC HYPERPLASIA WITHOUT LOWER URINARY TRACT SYMPMS: ICD-10-CM

## 2024-04-26 DIAGNOSIS — H60.399 OTHER INFECTIVE OTITIS EXTERNA, UNSPECIFIED EAR: ICD-10-CM

## 2024-04-26 DIAGNOSIS — H53.8 OTHER VISUAL DISTURBANCES: ICD-10-CM

## 2024-04-26 DIAGNOSIS — Z86.69 PERSONAL HISTORY OF OTHER DISEASES OF THE NERVOUS SYSTEM AND SENSE ORGANS: ICD-10-CM

## 2024-04-26 DIAGNOSIS — Z86.39 PERSONAL HISTORY OF OTHER ENDOCRINE, NUTRITIONAL AND METABOLIC DISEASE: ICD-10-CM

## 2024-04-26 DIAGNOSIS — Z23 ENCOUNTER FOR IMMUNIZATION: ICD-10-CM

## 2024-04-26 DIAGNOSIS — Z86.59 PERSONAL HISTORY OF OTHER MENTAL AND BEHAVIORAL DISORDERS: ICD-10-CM

## 2024-04-26 DIAGNOSIS — Z00.00 ENCOUNTER FOR GENERAL ADULT MEDICAL EXAMINATION W/OUT ABNORMAL FINDINGS: ICD-10-CM

## 2024-04-26 DIAGNOSIS — H90.3 SENSORINEURAL HEARING LOSS, BILATERAL: ICD-10-CM

## 2024-04-26 DIAGNOSIS — R07.89 OTHER CHEST PAIN: ICD-10-CM

## 2024-04-26 DIAGNOSIS — G47.9 SLEEP DISORDER, UNSPECIFIED: ICD-10-CM

## 2024-04-26 DIAGNOSIS — H40.9 UNSPECIFIED GLAUCOMA: ICD-10-CM

## 2024-04-26 DIAGNOSIS — M25.559 PAIN IN UNSPECIFIED HIP: ICD-10-CM

## 2024-04-26 DIAGNOSIS — Z12.11 ENCOUNTER FOR SCREENING FOR MALIGNANT NEOPLASM OF COLON: ICD-10-CM

## 2024-04-26 DIAGNOSIS — H90.72 MIXED CONDUCTIVE AND SENSORINEURAL HEARING LOSS, UNILATERAL, LEFT EAR, WITH UNRESTRICTED HEARING ON THE CONTRALATERAL SIDE: ICD-10-CM

## 2024-04-26 DIAGNOSIS — E78.5 HYPERLIPIDEMIA, UNSPECIFIED: ICD-10-CM

## 2024-04-26 DIAGNOSIS — I10 ESSENTIAL (PRIMARY) HYPERTENSION: ICD-10-CM

## 2024-04-26 DIAGNOSIS — Z86.018 PERSONAL HISTORY OF OTHER BENIGN NEOPLASM: ICD-10-CM

## 2024-04-26 DIAGNOSIS — H65.112 ACUTE AND SUBACUTE ALLERGIC OTITIS MEDIA (MUCOID) (SANGUINOUS) (SEROUS), LEFT EAR: ICD-10-CM

## 2024-04-26 PROCEDURE — 99214 OFFICE O/P EST MOD 30 MIN: CPT

## 2024-04-26 RX ORDER — DICLOFENAC SODIUM 1% 10 MG/G
1 GEL TOPICAL
Qty: 300 | Refills: 10 | Status: ACTIVE | COMMUNITY
Start: 2020-11-30 | End: 1900-01-01

## 2024-04-26 RX ORDER — TIMOLOL MALEATE 5 MG/ML
0.5 SOLUTION OPHTHALMIC
Qty: 1 | Refills: 3 | Status: ACTIVE | COMMUNITY
Start: 2020-08-20 | End: 1900-01-01

## 2024-04-26 RX ORDER — AMOXICILLIN AND CLAVULANATE POTASSIUM 875; 125 MG/1; MG/1
875-125 TABLET, COATED ORAL
Qty: 10 | Refills: 0 | Status: DISCONTINUED | COMMUNITY
Start: 2023-06-08 | End: 2024-04-26

## 2024-04-26 RX ORDER — DOCUSATE SODIUM 100 MG/1
100 CAPSULE ORAL
Qty: 180 | Refills: 3 | Status: DISCONTINUED | COMMUNITY
Start: 2020-05-20 | End: 2024-04-26

## 2024-04-26 RX ORDER — METFORMIN HYDROCHLORIDE 1000 MG/1
1000 TABLET, FILM COATED, EXTENDED RELEASE ORAL
Qty: 90 | Refills: 3 | Status: ACTIVE | COMMUNITY
Start: 2022-10-13 | End: 1900-01-01

## 2024-04-26 RX ORDER — AMLODIPINE BESYLATE 10 MG/1
10 TABLET ORAL DAILY
Qty: 90 | Refills: 3 | Status: DISCONTINUED | COMMUNITY
Start: 2017-10-26 | End: 2024-04-26

## 2024-04-26 RX ORDER — LATANOPROST/PF 0.005 %
0.01 DROPS OPHTHALMIC (EYE)
Qty: 3 | Refills: 1 | Status: ACTIVE | COMMUNITY
Start: 2020-08-20

## 2024-04-26 RX ORDER — LORATADINE 10 MG
17 TABLET,DISINTEGRATING ORAL DAILY
Qty: 3 | Refills: 2 | Status: DISCONTINUED | COMMUNITY
Start: 2021-09-17 | End: 2024-04-26

## 2024-04-26 RX ORDER — LISINOPRIL 10 MG/1
10 TABLET ORAL DAILY
Qty: 90 | Refills: 3 | Status: ACTIVE | COMMUNITY
Start: 2017-10-26 | End: 1900-01-01

## 2024-04-26 RX ORDER — ATORVASTATIN CALCIUM 20 MG/1
20 TABLET, FILM COATED ORAL
Qty: 90 | Refills: 3 | Status: ACTIVE | COMMUNITY
Start: 2019-08-16 | End: 1900-01-01

## 2024-04-26 NOTE — PHYSICAL EXAM
[No Acute Distress] : no acute distress [Well Nourished] : well nourished [Well Developed] : well developed [Normal Sclera/Conjunctiva] : normal sclera/conjunctiva [PERRL] : pupils equal round and reactive to light [Normal Outer Ear/Nose] : the outer ears and nose were normal in appearance [No JVD] : no jugular venous distention [Supple] : supple [No Respiratory Distress] : no respiratory distress  [No Accessory Muscle Use] : no accessory muscle use [Clear to Auscultation] : lungs were clear to auscultation bilaterally [Normal Rate] : normal rate  [Regular Rhythm] : with a regular rhythm [Normal S1, S2] : normal S1 and S2 [No Varicosities] : no varicosities [No Edema] : there was no peripheral edema [Soft] : abdomen soft [Non Tender] : non-tender [Non-distended] : non-distended [Normal Bowel Sounds] : normal bowel sounds [No Rash] : no rash [No Focal Deficits] : no focal deficits [Normal Affect] : the affect was normal

## 2024-04-26 NOTE — HISTORY OF PRESENT ILLNESS
[de-identified] : 79 yo M PMH HTN DM HLD presents for follow up. He complaints of generalized weakness. Denies any significant weight loss, fever, chills, blood in the stool.   Since last visit, pt did not get the carotid US or TTE yet and notes he still wants to follow up with those because he gets chest pain randomly, would resolve after drinking a lot of water. Denies any chest pain or shortness of breath currently.  Additionally states that he wants his medication sent to the same pharmacy ([facility], [address] [place] [address] , voice: [phone], fax: [phone])

## 2024-04-26 NOTE — ASSESSMENT
[FreeTextEntry1] : #Generalized weakness  - no labs in recent months  - repeat labs including vitamin B12   #R hip pain - R hip XR reviewed with patient : 10/13/2023 No acute fracture is seen. There is severe right hip arthrosis with bone-on-bone apposition. There is moderate left hip arthrosis. Degenerative changes are seen in the visualized lower lumbar spine. The patient is noted to be status post Urolift of the prostate. - c/w with Voltron gel. Advised pt to call [facility] [facility] because they have more refills - c/w physical therapy   #Atypical Chest pain, intermittent. No sx presently - - cardiology referral given   #HTN -c/w lisinopril. sent rx to new pharmacy: [facility] -blood pressure machine script given   #DM2 -reviewed A1c 6.1 (9/29/2023) from 7.7 (6/2023) -c/w metformin -c/w diet and exercise - repeat A1c   #HLD -reviewed lipid profile and CMP (9/29/2023) -c/w diet and exercise -c/w atorvastatin, sent rx to new pharmacy: [facility] - repeat lipid profile   #HCM - repeat blood work - fu 3 months.

## 2024-04-29 ENCOUNTER — OUTPATIENT (OUTPATIENT)
Dept: OUTPATIENT SERVICES | Facility: HOSPITAL | Age: 79
LOS: 1 days | End: 2024-04-29

## 2024-04-29 DIAGNOSIS — M19.90 UNSPECIFIED OSTEOARTHRITIS, UNSPECIFIED SITE: ICD-10-CM

## 2024-04-30 DIAGNOSIS — I10 ESSENTIAL (PRIMARY) HYPERTENSION: ICD-10-CM

## 2024-04-30 DIAGNOSIS — E78.5 HYPERLIPIDEMIA, UNSPECIFIED: ICD-10-CM

## 2024-04-30 DIAGNOSIS — E11.9 TYPE 2 DIABETES MELLITUS WITHOUT COMPLICATIONS: ICD-10-CM

## 2024-04-30 DIAGNOSIS — H40.9 UNSPECIFIED GLAUCOMA: ICD-10-CM

## 2024-04-30 DIAGNOSIS — K21.9 GASTRO-ESOPHAGEAL REFLUX DISEASE WITHOUT ESOPHAGITIS: ICD-10-CM

## 2024-04-30 DIAGNOSIS — H53.8 OTHER VISUAL DISTURBANCES: ICD-10-CM

## 2024-04-30 DIAGNOSIS — I82.409 ACUTE EMBOLISM AND THROMBOSIS OF UNSPECIFIED DEEP VEINS OF UNSPECIFIED LOWER EXTREMITY: ICD-10-CM

## 2024-04-30 DIAGNOSIS — H90.3 SENSORINEURAL HEARING LOSS, BILATERAL: ICD-10-CM

## 2024-04-30 DIAGNOSIS — M25.559 PAIN IN UNSPECIFIED HIP: ICD-10-CM

## 2024-04-30 DIAGNOSIS — N40.0 BENIGN PROSTATIC HYPERPLASIA WITHOUT LOWER URINARY TRACT SYMPTOMS: ICD-10-CM

## 2024-04-30 DIAGNOSIS — R07.9 CHEST PAIN, UNSPECIFIED: ICD-10-CM

## 2024-04-30 NOTE — REASON FOR VISIT
[Follow-Up] : a follow-up visit Cervix dilated to #15. Hysteroscope inserted- several polyps visualized. Bilateral ostia visualized. Polyps removed with myosure device and entire cavity noted to be normal appearing afterwards. Gentle sharp curettage performed. Fluid deficit 100 cc. [FreeTextEntry1] : Weakness; Dysuria

## 2024-05-01 ENCOUNTER — OUTPATIENT (OUTPATIENT)
Dept: OUTPATIENT SERVICES | Facility: HOSPITAL | Age: 79
LOS: 1 days | End: 2024-05-01
Payer: MEDICAID

## 2024-05-01 DIAGNOSIS — M19.90 UNSPECIFIED OSTEOARTHRITIS, UNSPECIFIED SITE: ICD-10-CM

## 2024-05-01 PROCEDURE — 97110 THERAPEUTIC EXERCISES: CPT | Mod: KX,GP

## 2024-05-02 ENCOUNTER — NON-APPOINTMENT (OUTPATIENT)
Age: 79
End: 2024-05-02

## 2024-05-02 DIAGNOSIS — M19.90 UNSPECIFIED OSTEOARTHRITIS, UNSPECIFIED SITE: ICD-10-CM

## 2024-05-04 NOTE — ED ADULT NURSE NOTE - NSFALLRSKASSESASSIST_ED_ALL_ED
You can access the FollowMyHealth Patient Portal offered by [facility] by registering at the following website: [email or web address]. By joining [facility]’s FollowMyHealth portal, you will also be able to view your health information using other applications (apps) compatible with our system. no

## 2024-05-07 ENCOUNTER — NON-APPOINTMENT (OUTPATIENT)
Age: 79
End: 2024-05-07

## 2024-05-16 ENCOUNTER — APPOINTMENT (OUTPATIENT)
Dept: OPHTHALMOLOGY | Facility: CLINIC | Age: 79
End: 2024-05-16

## 2024-05-16 ENCOUNTER — RESULT REVIEW (OUTPATIENT)
Age: 79
End: 2024-05-16

## 2024-05-16 ENCOUNTER — APPOINTMENT (OUTPATIENT)
Dept: CV DIAGNOSITCS | Facility: HOSPITAL | Age: 79
End: 2024-05-16
Payer: MEDICAID

## 2024-05-16 ENCOUNTER — OUTPATIENT (OUTPATIENT)
Dept: OUTPATIENT SERVICES | Facility: HOSPITAL | Age: 79
LOS: 1 days | End: 2024-05-16
Payer: MEDICAID

## 2024-05-16 DIAGNOSIS — R07.9 CHEST PAIN, UNSPECIFIED: ICD-10-CM

## 2024-05-16 PROCEDURE — 93306 TTE W/DOPPLER COMPLETE: CPT

## 2024-05-16 PROCEDURE — 93306 TTE W/DOPPLER COMPLETE: CPT | Mod: 26

## 2024-05-17 DIAGNOSIS — R07.9 CHEST PAIN, UNSPECIFIED: ICD-10-CM

## 2024-06-02 NOTE — DISCHARGE NOTE PROVIDER - INSTRUCTIONS
Received call from [facility].  speaking with Dr. Feldman. Patient accepted to [facility] at this time.   Regular diet

## 2024-06-21 ENCOUNTER — APPOINTMENT (OUTPATIENT)
Dept: INTERNAL MEDICINE | Facility: CLINIC | Age: 79
End: 2024-06-21

## 2024-07-16 ENCOUNTER — APPOINTMENT (OUTPATIENT)
Dept: OPHTHALMOLOGY | Facility: CLINIC | Age: 79
End: 2024-07-16

## 2024-07-17 ENCOUNTER — EMERGENCY (EMERGENCY)
Facility: HOSPITAL | Age: 79
LOS: 0 days | Discharge: HOME | End: 2024-07-17
Attending: EMERGENCY MEDICINE
Payer: MEDICARE

## 2024-07-17 VITALS
WEIGHT: 291.89 LBS | DIASTOLIC BLOOD PRESSURE: 67 MMHG | RESPIRATION RATE: 18 BRPM | OXYGEN SATURATION: 97 % | HEIGHT: 68 IN | SYSTOLIC BLOOD PRESSURE: 150 MMHG | TEMPERATURE: 98 F | HEART RATE: 92 BPM

## 2024-07-17 VITALS
HEART RATE: 79 BPM | TEMPERATURE: 99 F | DIASTOLIC BLOOD PRESSURE: 70 MMHG | RESPIRATION RATE: 19 BRPM | SYSTOLIC BLOOD PRESSURE: 135 MMHG | OXYGEN SATURATION: 97 %

## 2024-07-17 LAB
ALBUMIN SERPL ELPH-MCNC: 4.1 G/DL — SIGNIFICANT CHANGE UP (ref 3.5–5.2)
ALP SERPL-CCNC: 95 U/L — SIGNIFICANT CHANGE UP (ref 30–115)
ALT FLD-CCNC: 14 U/L — SIGNIFICANT CHANGE UP (ref 0–41)
ANION GAP SERPL CALC-SCNC: 14 MMOL/L — SIGNIFICANT CHANGE UP (ref 7–14)
APPEARANCE UR: CLEAR — SIGNIFICANT CHANGE UP
APTT BLD: 29.1 SEC — SIGNIFICANT CHANGE UP (ref 27–39.2)
AST SERPL-CCNC: 17 U/L — SIGNIFICANT CHANGE UP (ref 0–41)
BACTERIA # UR AUTO: ABNORMAL /HPF
BASOPHILS # BLD AUTO: 0.03 K/UL — SIGNIFICANT CHANGE UP (ref 0–0.2)
BASOPHILS NFR BLD AUTO: 0.4 % — SIGNIFICANT CHANGE UP (ref 0–1)
BILIRUB SERPL-MCNC: 0.4 MG/DL — SIGNIFICANT CHANGE UP (ref 0.2–1.2)
BILIRUB UR-MCNC: NEGATIVE — SIGNIFICANT CHANGE UP
BUN SERPL-MCNC: 11 MG/DL — SIGNIFICANT CHANGE UP (ref 10–20)
CALCIUM SERPL-MCNC: 9.3 MG/DL — SIGNIFICANT CHANGE UP (ref 8.4–10.4)
CAST: 0 /LPF — SIGNIFICANT CHANGE UP (ref 0–4)
CHLORIDE SERPL-SCNC: 102 MMOL/L — SIGNIFICANT CHANGE UP (ref 98–110)
CO2 SERPL-SCNC: 23 MMOL/L — SIGNIFICANT CHANGE UP (ref 17–32)
COLOR SPEC: YELLOW — SIGNIFICANT CHANGE UP
CREAT SERPL-MCNC: 1.3 MG/DL — SIGNIFICANT CHANGE UP (ref 0.7–1.5)
DIFF PNL FLD: NEGATIVE — SIGNIFICANT CHANGE UP
EGFR: 57 ML/MIN/1.73M2 — LOW
EOSINOPHIL # BLD AUTO: 0.1 K/UL — SIGNIFICANT CHANGE UP (ref 0–0.7)
EOSINOPHIL NFR BLD AUTO: 1.4 % — SIGNIFICANT CHANGE UP (ref 0–8)
GLUCOSE SERPL-MCNC: 170 MG/DL — HIGH (ref 70–99)
GLUCOSE UR QL: NEGATIVE MG/DL — SIGNIFICANT CHANGE UP
HCT VFR BLD CALC: 36.9 % — LOW (ref 42–52)
HGB BLD-MCNC: 12.5 G/DL — LOW (ref 14–18)
IMM GRANULOCYTES NFR BLD AUTO: 0.3 % — SIGNIFICANT CHANGE UP (ref 0.1–0.3)
INR BLD: 1.11 RATIO — SIGNIFICANT CHANGE UP (ref 0.65–1.3)
KETONES UR-MCNC: NEGATIVE MG/DL — SIGNIFICANT CHANGE UP
LACTATE SERPL-SCNC: 2.4 MMOL/L — HIGH (ref 0.7–2)
LEUKOCYTE ESTERASE UR-ACNC: ABNORMAL
LYMPHOCYTES # BLD AUTO: 1.72 K/UL — SIGNIFICANT CHANGE UP (ref 1.2–3.4)
LYMPHOCYTES # BLD AUTO: 23.6 % — SIGNIFICANT CHANGE UP (ref 20.5–51.1)
MCHC RBC-ENTMCNC: 27.4 PG — SIGNIFICANT CHANGE UP (ref 27–31)
MCHC RBC-ENTMCNC: 33.9 G/DL — SIGNIFICANT CHANGE UP (ref 32–37)
MCV RBC AUTO: 80.7 FL — SIGNIFICANT CHANGE UP (ref 80–94)
MONOCYTES # BLD AUTO: 0.74 K/UL — HIGH (ref 0.1–0.6)
MONOCYTES NFR BLD AUTO: 10.2 % — HIGH (ref 1.7–9.3)
NEUTROPHILS # BLD AUTO: 4.68 K/UL — SIGNIFICANT CHANGE UP (ref 1.4–6.5)
NEUTROPHILS NFR BLD AUTO: 64.1 % — SIGNIFICANT CHANGE UP (ref 42.2–75.2)
NITRITE UR-MCNC: POSITIVE
NRBC # BLD: 0 /100 WBCS — SIGNIFICANT CHANGE UP (ref 0–0)
PH UR: 6 — SIGNIFICANT CHANGE UP (ref 5–8)
PLATELET # BLD AUTO: 243 K/UL — SIGNIFICANT CHANGE UP (ref 130–400)
PMV BLD: 10 FL — SIGNIFICANT CHANGE UP (ref 7.4–10.4)
POTASSIUM SERPL-MCNC: 3.9 MMOL/L — SIGNIFICANT CHANGE UP (ref 3.5–5)
POTASSIUM SERPL-SCNC: 3.9 MMOL/L — SIGNIFICANT CHANGE UP (ref 3.5–5)
PROT SERPL-MCNC: 6.7 G/DL — SIGNIFICANT CHANGE UP (ref 6–8)
PROT UR-MCNC: NEGATIVE MG/DL — SIGNIFICANT CHANGE UP
PROTHROM AB SERPL-ACNC: 12.7 SEC — SIGNIFICANT CHANGE UP (ref 9.95–12.87)
RBC # BLD: 4.57 M/UL — LOW (ref 4.7–6.1)
RBC # FLD: 14.3 % — SIGNIFICANT CHANGE UP (ref 11.5–14.5)
RBC CASTS # UR COMP ASSIST: 1 /HPF — SIGNIFICANT CHANGE UP (ref 0–4)
SODIUM SERPL-SCNC: 139 MMOL/L — SIGNIFICANT CHANGE UP (ref 135–146)
SP GR SPEC: 1.02 — SIGNIFICANT CHANGE UP (ref 1–1.03)
SQUAMOUS # UR AUTO: 2 /HPF — SIGNIFICANT CHANGE UP (ref 0–5)
UROBILINOGEN FLD QL: 1 MG/DL — SIGNIFICANT CHANGE UP (ref 0.2–1)
WBC # BLD: 7.29 K/UL — SIGNIFICANT CHANGE UP (ref 4.8–10.8)
WBC # FLD AUTO: 7.29 K/UL — SIGNIFICANT CHANGE UP (ref 4.8–10.8)
WBC UR QL: 18 /HPF — HIGH (ref 0–5)

## 2024-07-17 PROCEDURE — 36415 COLL VENOUS BLD VENIPUNCTURE: CPT

## 2024-07-17 PROCEDURE — 70450 CT HEAD/BRAIN W/O DYE: CPT | Mod: 26,MC

## 2024-07-17 PROCEDURE — 85730 THROMBOPLASTIN TIME PARTIAL: CPT

## 2024-07-17 PROCEDURE — 81001 URINALYSIS AUTO W/SCOPE: CPT

## 2024-07-17 PROCEDURE — 71045 X-RAY EXAM CHEST 1 VIEW: CPT

## 2024-07-17 PROCEDURE — 70450 CT HEAD/BRAIN W/O DYE: CPT | Mod: MC

## 2024-07-17 PROCEDURE — 96374 THER/PROPH/DIAG INJ IV PUSH: CPT

## 2024-07-17 PROCEDURE — 87086 URINE CULTURE/COLONY COUNT: CPT

## 2024-07-17 PROCEDURE — 83605 ASSAY OF LACTIC ACID: CPT

## 2024-07-17 PROCEDURE — 85610 PROTHROMBIN TIME: CPT

## 2024-07-17 PROCEDURE — 71045 X-RAY EXAM CHEST 1 VIEW: CPT | Mod: 26

## 2024-07-17 PROCEDURE — 82962 GLUCOSE BLOOD TEST: CPT

## 2024-07-17 PROCEDURE — 93005 ELECTROCARDIOGRAM TRACING: CPT

## 2024-07-17 PROCEDURE — 99285 EMERGENCY DEPT VISIT HI MDM: CPT

## 2024-07-17 PROCEDURE — 80053 COMPREHEN METABOLIC PANEL: CPT

## 2024-07-17 PROCEDURE — 87077 CULTURE AEROBIC IDENTIFY: CPT

## 2024-07-17 PROCEDURE — 87186 SC STD MICRODIL/AGAR DIL: CPT

## 2024-07-17 PROCEDURE — 93010 ELECTROCARDIOGRAM REPORT: CPT

## 2024-07-17 PROCEDURE — 99285 EMERGENCY DEPT VISIT HI MDM: CPT | Mod: 25

## 2024-07-17 PROCEDURE — 85025 COMPLETE CBC W/AUTO DIFF WBC: CPT

## 2024-07-17 RX ORDER — SODIUM CHLORIDE 0.9 % (FLUSH) 0.9 %
1000 SYRINGE (ML) INJECTION ONCE
Refills: 0 | Status: COMPLETED | OUTPATIENT
Start: 2024-07-17 | End: 2024-07-17

## 2024-07-17 RX ORDER — CEFTRIAXONE SODIUM 500 MG
1000 VIAL (EA) INJECTION ONCE
Refills: 0 | Status: COMPLETED | OUTPATIENT
Start: 2024-07-17 | End: 2024-07-17

## 2024-07-17 RX ORDER — MECLIZINE HCL 25 MG
1 TABLET ORAL
Qty: 14 | Refills: 0
Start: 2024-07-17 | End: 2024-07-23

## 2024-07-17 RX ORDER — CEFPODOXIME PROXETIL 50 MG/5 ML
1 SUSPENSION, RECONSTITUTED, ORAL (ML) ORAL
Qty: 14 | Refills: 0
Start: 2024-07-17 | End: 2024-07-23

## 2024-07-17 RX ADMIN — Medication 100 MILLIGRAM(S): at 15:50

## 2024-07-17 RX ADMIN — Medication 1000 MILLILITER(S): at 13:29

## 2024-07-18 NOTE — ED ADULT NURSE NOTE - PAIN RATING/NUMBER SCALE (0-10): REST
Detail Level: Zone
Plan: I recommended a broad spectrum sunscreen with a SPF of 30 or higher. Sunscreens should be applied at least 15 minutes prior to expected sun exposure and then every 2 hours after that as long as sun exposure continues.
0

## 2024-07-22 ENCOUNTER — APPOINTMENT (OUTPATIENT)
Dept: INTERNAL MEDICINE | Facility: CLINIC | Age: 79
End: 2024-07-22

## 2024-08-06 ENCOUNTER — APPOINTMENT (OUTPATIENT)
Dept: OTOLARYNGOLOGY | Facility: CLINIC | Age: 79
End: 2024-08-06

## 2024-08-06 ENCOUNTER — NON-APPOINTMENT (OUTPATIENT)
Age: 79
End: 2024-08-06

## 2024-08-06 PROBLEM — K21.9 LPRD (LARYNGOPHARYNGEAL REFLUX DISEASE): Status: ACTIVE | Noted: 2024-08-06

## 2024-08-06 PROBLEM — H90.3 ASYMMETRIC SNHL (SENSORINEURAL HEARING LOSS): Status: ACTIVE | Noted: 2024-08-06

## 2024-08-06 PROBLEM — J01.80 OTHER ACUTE SINUSITIS, RECURRENCE NOT SPECIFIED: Status: ACTIVE | Noted: 2024-08-06

## 2024-08-06 PROBLEM — R22.1 NECK SWELLING: Status: ACTIVE | Noted: 2024-08-06

## 2024-08-06 PROBLEM — H93.8X3 CLOGGED EAR, BILATERAL: Status: ACTIVE | Noted: 2024-08-06

## 2024-08-06 PROBLEM — H61.23 BILATERAL IMPACTED CERUMEN: Status: ACTIVE | Noted: 2024-08-06

## 2024-08-06 PROCEDURE — 31231 NASAL ENDOSCOPY DX: CPT

## 2024-08-06 PROCEDURE — 99204 OFFICE O/P NEW MOD 45 MIN: CPT | Mod: 25

## 2024-08-06 PROCEDURE — G0268 REMOVAL OF IMPACTED WAX MD: CPT

## 2024-08-06 NOTE — PHYSICAL EXAM
[Nasal Endoscopy Performed] : nasal endoscopy was performed, see procedure section for findings [Normal] : mucosa is normal [Midline] : trachea located in midline position [de-identified] : TM's obstructed with cerumen. Wax removed with microsuciton.  [de-identified] : edema

## 2024-08-06 NOTE — HISTORY OF PRESENT ILLNESS
[de-identified] :  Patient presents today c/o hearing loss and throat discomfort. He has noticed a change to hearing. Last audiogram performed was 3 years ago, told to have hearing loss. Throat discomfort feels like throat is swelling consistent. He denies any pain when swallowing .

## 2024-08-23 ENCOUNTER — OUTPATIENT (OUTPATIENT)
Dept: OUTPATIENT SERVICES | Facility: HOSPITAL | Age: 79
LOS: 1 days | End: 2024-08-23
Payer: MEDICARE

## 2024-08-23 ENCOUNTER — RESULT REVIEW (OUTPATIENT)
Age: 79
End: 2024-08-23

## 2024-08-23 DIAGNOSIS — H90.3 SENSORINEURAL HEARING LOSS, BILATERAL: ICD-10-CM

## 2024-08-23 PROCEDURE — 70553 MRI BRAIN STEM W/O & W/DYE: CPT | Mod: 26

## 2024-08-23 PROCEDURE — 70553 MRI BRAIN STEM W/O & W/DYE: CPT

## 2024-08-24 DIAGNOSIS — H90.3 SENSORINEURAL HEARING LOSS, BILATERAL: ICD-10-CM

## 2024-09-03 ENCOUNTER — LABORATORY RESULT (OUTPATIENT)
Age: 79
End: 2024-09-03

## 2024-09-04 ENCOUNTER — APPOINTMENT (OUTPATIENT)
Dept: OTOLARYNGOLOGY | Facility: CLINIC | Age: 79
End: 2024-09-04
Payer: MEDICARE

## 2024-09-04 DIAGNOSIS — J01.80 OTHER ACUTE SINUSITIS: ICD-10-CM

## 2024-09-04 DIAGNOSIS — H61.23 IMPACTED CERUMEN, BILATERAL: ICD-10-CM

## 2024-09-04 DIAGNOSIS — J34.0 ABSCESS, FURUNCLE AND CARBUNCLE OF NOSE: ICD-10-CM

## 2024-09-04 DIAGNOSIS — H93.8X3 OTHER SPECIFIED DISORDERS OF EAR, BILATERAL: ICD-10-CM

## 2024-09-04 PROCEDURE — 99214 OFFICE O/P EST MOD 30 MIN: CPT | Mod: 25

## 2024-09-04 PROCEDURE — 31231 NASAL ENDOSCOPY DX: CPT

## 2024-09-04 RX ORDER — AMOXICILLIN AND CLAVULANATE POTASSIUM 875; 125 MG/1; MG/1
875-125 TABLET, COATED ORAL
Qty: 42 | Refills: 0 | Status: ACTIVE | COMMUNITY
Start: 2024-09-04 | End: 1900-01-01

## 2024-09-04 NOTE — PROCEDURE
[Nasal Mucosa] : purulence on the right [FreeTextEntry6] : The following anatomic sites were directly examined in a sequential fashion: The scope was introduced in the nasal passage between the middle and inferior turbinates to exam the inferior portion of the middle meatus and the fontanelle, as well as the maxillary ostia. Next, the scope was passed medically and posteriorly to the middle turbinates to examine the sphenoethmoid recess and the superior turbinate region. turbinate hypertrophy [de-identified] : sinusitis

## 2024-09-04 NOTE — DATA REVIEWED
[de-identified] : ACC: [id number]     EXAM:  MR IAC ONLY WAW IC   ORDERED BY: ERIKA SANCHEZ  PROCEDURE DATE:  08/23/2024    INTERPRETATION:  Clinical History / Reason for exam: Left sensorineural hearing loss  Technique: MRI of the internal auditory canals with and without contrast.  Multiplanar multi-sequential MRI of the internal auditory canals was performed before and following intravenous administration of 8.5 cc Gadavist (1.5 cc discarded).  Comparison: MRI brain and IACs dated 6/25/2021.  FINDINGS:  There is no evidence of abnormal signal or enhancement within the internal auditory canals or cerebellopontine angle cisterns. There is normal CSF signal within the inner ear structures.  There is stable patchy foci of T2/FLAIR signal hyperintensity within the cerebral white matter consistent with chronic microvascular change. Stable chronic lacunar infarcts within the left frontal white matter, the right basal ganglia and the left aspect of the keyonna. There is no evidence of acute infarct.  There is no evidence for pathologic intracranial enhancement.  There is trace fluid within the left mastoid complex.  There is complete opacification of the right frontal, anterior ethmoid and maxillary sinuses and nasal cavity. There is an apparent rim-enhancing fluid collection spanning the right nasal cavity and right maxillary sinus measuring 3.3 x 3 x 2.5 cm (transverse, AP, CC). Additional mild scattered paranasal sinus mucosal thickening.  IMPRESSION:  1.  Unremarkable MRI examination of the IACs.  2.  Stable chronic microvascular changes and scattered chronic lacunar infarcts.  3.  Complete opacification of the right maxillary, anterior ethmoid and frontal sinuses (new since 2021; similar since CT head 7/17/24). Apparent rim-enhancing fluid collection spanning the right nasal cavity and right maxillary sinus measuring 3.3 cm. Cannot exclude abscess.     --- End of Report ---      JAMES MENJIVAR MD; Attending Radiologist This document has been electronically signed. Aug 28 2024 11:49AM

## 2024-09-04 NOTE — HISTORY OF PRESENT ILLNESS
[FreeTextEntry1] : Patient presents today c/o hearing loss and throat discomfort. Having issue with breathing through nose, having rhinorrhea. Reports doxycycline did not help. Using famotidine and pantoprazole with improvement of throat discomfort. He finished his pantoprazole.  Had MRI IAC w/w/o IVC 8/23/24.

## 2024-09-04 NOTE — REASON FOR VISIT
[Subsequent Evaluation] : a subsequent evaluation for [FreeTextEntry2] : hearing loss and throat discomfort

## 2024-09-04 NOTE — PHYSICAL EXAM
[Normal] : mucosa is normal [Midline] : trachea located in midline position [de-identified] : R ear wax removed with curette [de-identified] : edema

## 2024-09-23 ENCOUNTER — APPOINTMENT (OUTPATIENT)
Dept: INTERNAL MEDICINE | Facility: CLINIC | Age: 79
End: 2024-09-23

## 2024-09-23 ENCOUNTER — OUTPATIENT (OUTPATIENT)
Dept: OUTPATIENT SERVICES | Facility: HOSPITAL | Age: 79
LOS: 1 days | End: 2024-09-23
Payer: MEDICARE

## 2024-09-23 VITALS
TEMPERATURE: 97.1 F | HEIGHT: 68 IN | HEART RATE: 64 BPM | WEIGHT: 199 LBS | SYSTOLIC BLOOD PRESSURE: 159 MMHG | OXYGEN SATURATION: 99 % | DIASTOLIC BLOOD PRESSURE: 77 MMHG | BODY MASS INDEX: 30.16 KG/M2

## 2024-09-23 DIAGNOSIS — Z00.00 ENCOUNTER FOR GENERAL ADULT MEDICAL EXAMINATION W/OUT ABNORMAL FINDINGS: ICD-10-CM

## 2024-09-23 DIAGNOSIS — Z00.00 ENCOUNTER FOR GENERAL ADULT MEDICAL EXAMINATION WITHOUT ABNORMAL FINDINGS: ICD-10-CM

## 2024-09-23 DIAGNOSIS — Z23 ENCOUNTER FOR IMMUNIZATION: ICD-10-CM

## 2024-09-23 PROCEDURE — 99214 OFFICE O/P EST MOD 30 MIN: CPT | Mod: 25

## 2024-09-23 PROCEDURE — 90471 IMMUNIZATION ADMIN: CPT

## 2024-09-23 PROCEDURE — 90662 IIV NO PRSV INCREASED AG IM: CPT

## 2024-09-23 NOTE — PHYSICAL EXAM
[No Acute Distress] : no acute distress [Well Nourished] : well nourished [Well Developed] : well developed [No JVD] : no jugular venous distention [Normal Rate] : normal rate  [No Respiratory Distress] : no respiratory distress  [Regular Rhythm] : with a regular rhythm [Soft] : abdomen soft [Non Tender] : non-tender

## 2024-09-23 NOTE — ASSESSMENT
[FreeTextEntry1] : #R hip pain - R hip XR reviewed with patient : 10/13/2023 No acute fracture is seen. There is severe right hip arthrosis with bone-on-bone apposition.. Degenerative changes are seen in the visualized lower lumbar spine. The patient is noted to be status post Urolift of the prostate. - c/w with Voltron gel. Advised pt to call [place] pharmacy because they have more refills - c/w physical therapy - ortho referral for possible injection   #HTN - c/w lisinopril - elevated today but previous visits was controlled  - blood pressure machine script given  #DM2 -c/w metformin -c/w diet and exercise - repeat A1c  #HLD - c/w diet and exercise - c/w atorvastatin - Repeat lipid profile  #HCM - repeat blood work - fu 3 months. - med refill given  - Flu vaccine given today  - Ortho, dental, optho eval

## 2024-09-23 NOTE — REVIEW OF SYSTEMS
[Joint Pain] : joint pain [Fever] : no fever [Chills] : no chills [Chest Pain] : no chest pain [Shortness Of Breath] : no shortness of breath [Abdominal Pain] : no abdominal pain [Dysuria] : no dysuria

## 2024-09-23 NOTE — HISTORY OF PRESENT ILLNESS
[de-identified] : 77 yo M PMH HTN DM HLD OA presents for follow up. Today, overall feels better however complaining of some right hip pain when he walks. Hip Xray reviewed from last year showing severe OA in right hip. Denies any significant chest pain, weight loss, fever, chills, blood in the stool. Wants flu vaccine

## 2024-09-25 ENCOUNTER — APPOINTMENT (OUTPATIENT)
Dept: OTOLARYNGOLOGY | Facility: CLINIC | Age: 79
End: 2024-09-25
Payer: MEDICARE

## 2024-09-25 DIAGNOSIS — J34.0 ABSCESS, FURUNCLE AND CARBUNCLE OF NOSE: ICD-10-CM

## 2024-09-25 DIAGNOSIS — H61.23 IMPACTED CERUMEN, BILATERAL: ICD-10-CM

## 2024-09-25 DIAGNOSIS — R05.9 COUGH, UNSPECIFIED: ICD-10-CM

## 2024-09-25 DIAGNOSIS — J34.89 OTHER SPECIFIED DISORDERS OF NOSE AND NASAL SINUSES: ICD-10-CM

## 2024-09-25 DIAGNOSIS — J01.80 OTHER ACUTE SINUSITIS: ICD-10-CM

## 2024-09-25 PROCEDURE — 99213 OFFICE O/P EST LOW 20 MIN: CPT | Mod: 25

## 2024-09-25 PROCEDURE — 69210 REMOVE IMPACTED EAR WAX UNI: CPT

## 2024-09-25 PROCEDURE — 31231 NASAL ENDOSCOPY DX: CPT

## 2024-09-25 RX ORDER — AZELASTINE HYDROCHLORIDE 137 UG/1
0.1 SPRAY, METERED NASAL DAILY
Qty: 30 | Refills: 6 | Status: ACTIVE | COMMUNITY
Start: 2024-09-25 | End: 1900-01-01

## 2024-09-25 NOTE — PHYSICAL EXAM
[Midline] : trachea located in midline position [de-identified] : R ear wax removed with curette [Nasal Endoscopy Performed] : nasal endoscopy was performed, see procedure section for findings [Normal] : no abnormal secretions [de-identified] : edema

## 2024-09-25 NOTE — PROCEDURE
[None] : none [Rigid Endoscope] : examined with a rigid endoscope [Congested] : congested [___ cm] : [unfilled]Ucm polyp(s) on the right [FreeTextEntry6] : The following anatomic sites were directly examined in a sequential fashion: The scope was introduced in the nasal passage between the middle and inferior turbinates to exam the inferior portion of the middle meatus and the fontanelle, as well as the maxillary ostia. Next, the scope was passed medically and posteriorly to the middle turbinates to examine the sphenoethmoid recess and the superior turbinate region.

## 2024-09-25 NOTE — REASON FOR VISIT
[Subsequent Evaluation] : a subsequent evaluation for [FreeTextEntry2] : acute sinusitis and nasal abscess

## 2024-09-25 NOTE — HISTORY OF PRESENT ILLNESS
[FreeTextEntry1] : Patient returns today c/o acute sinusitis and nasal abscess. Having difficulty breathing through nose worse in the night. Occasionally having clear mucus from nose. Denies use of nasal spray. Continues to take antibiotics with some improvement of symptoms.  Having foul odor from nose. Coughing up yellow mucus at times.

## 2024-10-01 DIAGNOSIS — E11.9 TYPE 2 DIABETES MELLITUS WITHOUT COMPLICATIONS: ICD-10-CM

## 2024-10-01 DIAGNOSIS — Z00.00 ENCOUNTER FOR GENERAL ADULT MEDICAL EXAMINATION WITHOUT ABNORMAL FINDINGS: ICD-10-CM

## 2024-10-01 DIAGNOSIS — Z23 ENCOUNTER FOR IMMUNIZATION: ICD-10-CM

## 2024-10-02 ENCOUNTER — APPOINTMENT (OUTPATIENT)
Dept: OPHTHALMOLOGY | Facility: CLINIC | Age: 79
End: 2024-10-02
Payer: MEDICARE

## 2024-10-02 ENCOUNTER — OUTPATIENT (OUTPATIENT)
Dept: OUTPATIENT SERVICES | Facility: HOSPITAL | Age: 79
LOS: 1 days | End: 2024-10-02
Payer: MEDICARE

## 2024-10-02 DIAGNOSIS — H53.8 OTHER VISUAL DISTURBANCES: ICD-10-CM

## 2024-10-02 PROCEDURE — 92014 COMPRE OPH EXAM EST PT 1/>: CPT

## 2024-10-02 PROCEDURE — 92004 COMPRE OPH EXAM NEW PT 1/>: CPT

## 2024-10-10 ENCOUNTER — APPOINTMENT (OUTPATIENT)
Dept: OTOLARYNGOLOGY | Facility: CLINIC | Age: 79
End: 2024-10-10
Payer: MEDICARE

## 2024-10-10 DIAGNOSIS — R22.1 LOCALIZED SWELLING, MASS AND LUMP, NECK: ICD-10-CM

## 2024-10-10 DIAGNOSIS — J34.89 OTHER SPECIFIED DISORDERS OF NOSE AND NASAL SINUSES: ICD-10-CM

## 2024-10-10 PROCEDURE — 99213 OFFICE O/P EST LOW 20 MIN: CPT | Mod: 25

## 2024-10-10 PROCEDURE — 31231 NASAL ENDOSCOPY DX: CPT

## 2024-10-15 DIAGNOSIS — H25.813 COMBINED FORMS OF AGE-RELATED CATARACT, BILATERAL: ICD-10-CM

## 2024-10-15 DIAGNOSIS — E11.9 TYPE 2 DIABETES MELLITUS WITHOUT COMPLICATIONS: ICD-10-CM

## 2024-10-15 DIAGNOSIS — H40.1232 LOW-TENSION GLAUCOMA, BILATERAL, MODERATE STAGE: ICD-10-CM

## 2024-10-15 DIAGNOSIS — H47.012 ISCHEMIC OPTIC NEUROPATHY, LEFT EYE: ICD-10-CM

## 2024-11-02 NOTE — ED PROCEDURE NOTE - FOLLOW-UP STUDY
NURSING ADMISSION NOTE      Patient admitted via Cart.  Oriented to room.  Safety precautions initiated.  Bed in low position.  Call light in reach.  Patient's wife at bedside, assisted in completing admission navigator.     Patient reporting difficulty breathing, O2 sats %. States he has congestion through his nose. MD notified, no new orders received.     Skin check performed with Shana NARAYANAN. Scattered bruising noted to abimbola arms and hands. Scabs to bilateral shins.    US

## 2024-11-07 ENCOUNTER — OUTPATIENT (OUTPATIENT)
Dept: OUTPATIENT SERVICES | Facility: HOSPITAL | Age: 79
LOS: 1 days | End: 2024-11-07
Payer: MEDICARE

## 2024-11-07 DIAGNOSIS — K02.9 DENTAL CARIES, UNSPECIFIED: ICD-10-CM

## 2024-11-07 PROCEDURE — D0274: CPT

## 2024-11-07 PROCEDURE — D0230: CPT

## 2024-11-07 PROCEDURE — D0150: CPT

## 2024-11-07 PROCEDURE — D0330: CPT

## 2024-11-08 DIAGNOSIS — Z01.21 ENCOUNTER FOR DENTAL EXAMINATION AND CLEANING WITH ABNORMAL FINDINGS: ICD-10-CM

## 2024-11-14 ENCOUNTER — APPOINTMENT (OUTPATIENT)
Dept: OTOLARYNGOLOGY | Facility: CLINIC | Age: 79
End: 2024-11-14
Payer: MEDICARE

## 2024-11-14 DIAGNOSIS — J01.80 OTHER ACUTE SINUSITIS: ICD-10-CM

## 2024-11-14 DIAGNOSIS — J34.89 OTHER SPECIFIED DISORDERS OF NOSE AND NASAL SINUSES: ICD-10-CM

## 2024-11-14 DIAGNOSIS — R22.1 LOCALIZED SWELLING, MASS AND LUMP, NECK: ICD-10-CM

## 2024-11-14 PROCEDURE — 31231 NASAL ENDOSCOPY DX: CPT

## 2024-11-14 PROCEDURE — 99213 OFFICE O/P EST LOW 20 MIN: CPT | Mod: 25

## 2024-11-15 ENCOUNTER — OUTPATIENT (OUTPATIENT)
Dept: OUTPATIENT SERVICES | Facility: HOSPITAL | Age: 79
LOS: 1 days | End: 2024-11-15
Payer: MEDICARE

## 2024-11-15 ENCOUNTER — RESULT REVIEW (OUTPATIENT)
Age: 79
End: 2024-11-15

## 2024-11-15 DIAGNOSIS — J34.89 OTHER SPECIFIED DISORDERS OF NOSE AND NASAL SINUSES: ICD-10-CM

## 2024-11-15 DIAGNOSIS — Z00.8 ENCOUNTER FOR OTHER GENERAL EXAMINATION: ICD-10-CM

## 2024-11-15 PROCEDURE — 70486 CT MAXILLOFACIAL W/O DYE: CPT

## 2024-11-15 PROCEDURE — 70486 CT MAXILLOFACIAL W/O DYE: CPT | Mod: 26

## 2024-11-16 DIAGNOSIS — J34.89 OTHER SPECIFIED DISORDERS OF NOSE AND NASAL SINUSES: ICD-10-CM

## 2024-12-12 ENCOUNTER — APPOINTMENT (OUTPATIENT)
Dept: OTOLARYNGOLOGY | Facility: CLINIC | Age: 79
End: 2024-12-12
Payer: MEDICARE

## 2024-12-12 DIAGNOSIS — J01.80 OTHER ACUTE SINUSITIS: ICD-10-CM

## 2024-12-12 DIAGNOSIS — R09.81 NASAL CONGESTION: ICD-10-CM

## 2024-12-12 DIAGNOSIS — J34.89 OTHER SPECIFIED DISORDERS OF NOSE AND NASAL SINUSES: ICD-10-CM

## 2024-12-12 DIAGNOSIS — J34.0 ABSCESS, FURUNCLE AND CARBUNCLE OF NOSE: ICD-10-CM

## 2024-12-12 PROCEDURE — 31231 NASAL ENDOSCOPY DX: CPT

## 2024-12-12 PROCEDURE — 99214 OFFICE O/P EST MOD 30 MIN: CPT | Mod: 25

## 2024-12-12 RX ORDER — FLUTICASONE PROPIONATE 50 UG/1
50 SPRAY, METERED NASAL DAILY
Qty: 1 | Refills: 6 | Status: ACTIVE | COMMUNITY
Start: 2024-12-12 | End: 1900-01-01

## 2024-12-19 DIAGNOSIS — R35.0 FREQUENCY OF MICTURITION: ICD-10-CM

## 2024-12-24 PROBLEM — F10.90 ALCOHOL USE: Status: INACTIVE | Noted: 2017-10-26

## 2024-12-30 ENCOUNTER — OUTPATIENT (OUTPATIENT)
Dept: OUTPATIENT SERVICES | Facility: HOSPITAL | Age: 79
LOS: 1 days | End: 2024-12-30
Payer: MEDICARE

## 2024-12-30 DIAGNOSIS — R35.0 FREQUENCY OF MICTURITION: ICD-10-CM

## 2024-12-30 PROCEDURE — 76770 US EXAM ABDO BACK WALL COMP: CPT | Mod: 26

## 2024-12-30 PROCEDURE — 76770 US EXAM ABDO BACK WALL COMP: CPT

## 2024-12-31 DIAGNOSIS — R35.0 FREQUENCY OF MICTURITION: ICD-10-CM

## 2025-01-31 ENCOUNTER — APPOINTMENT (OUTPATIENT)
Dept: INTERNAL MEDICINE | Facility: CLINIC | Age: 80
End: 2025-01-31

## 2025-02-04 NOTE — ED ADULT TRIAGE NOTE - ACCOMPANIED BY
Epidural catheter Procedure Note    Pre-Procedure   Staff -        Anesthesiologist:  Willie Valiente MD       Performed By: anesthesiologist       Location: OB       Pre-Anesthestic Checklist: patient identified, IV checked, risks and benefits discussed, informed consent, monitors and equipment checked, pre-op evaluation and at physician/surgeon's request  Timeout:       Correct Patient: Yes        Correct Procedure: Yes        Correct Site: Yes        Correct Position: Yes   Procedure Documentation  Procedure: epidural catheter         Patient Position: sitting       Patient Prep/Sterile Barriers: sterile gloves, mask, patient draped       Skin prep: Betadine       Local skin infiltrated with mL of 1% lidocaine.        Insertion Site: L4-5. (midline approach).       Technique: LORT saline        KOKO at 6 cm.       Needle Type: gDiney needle       Needle Gauge: 17.        Needle Length (Inches): 3.5           Catheter threaded easily.           Threaded 11 cm at skin.         # of attempts: 1 and  # of redirects:     Assessment/Narrative         Paresthesias: No.       Test dose of 3 mL lidocaine 1.5% w/ 1:200,000 epinephrine at.         Test dose negative, 3 minutes after injection, for signs of intravascular, subdural, or intrathecal injection.       Insertion/Infusion Method: LORT saline       Aspiration negative for Heme or CSF via Epidural Catheter.    Medication(s) Administered   0.2% Ropivacaine (Epidural) - EPIDURAL   10 mL - 2/4/2025 11:04:00 AM   Comments:  No complications.  Catheter secured with sterile dressing and tape.  Questions answered.    Orders to manage the epidural infusion have been entered and, through coordination with the nurse, we will continue to manage and monitor the patient's labor epidural.  We will continuously be available to adjust as needed throughout the entire labor and delivery process.        FOR [facility] ([facility] ONLY:   Pain Team Contact information: please page the  "Pain Team [facility]. Search \"Pain\". During daytime hours, please page the attending first. At night please page the resident first.      " Self

## 2025-02-13 ENCOUNTER — OUTPATIENT (OUTPATIENT)
Dept: OUTPATIENT SERVICES | Facility: HOSPITAL | Age: 80
LOS: 1 days | End: 2025-02-13
Payer: MEDICARE

## 2025-02-13 ENCOUNTER — APPOINTMENT (OUTPATIENT)
Dept: OPHTHALMOLOGY | Facility: CLINIC | Age: 80
End: 2025-02-13
Payer: MEDICARE

## 2025-02-13 DIAGNOSIS — H53.8 OTHER VISUAL DISTURBANCES: ICD-10-CM

## 2025-02-13 PROCEDURE — 92083 EXTENDED VISUAL FIELD XM: CPT

## 2025-02-13 PROCEDURE — 92083 EXTENDED VISUAL FIELD XM: CPT | Mod: 26

## 2025-02-14 ENCOUNTER — APPOINTMENT (OUTPATIENT)
Dept: INTERNAL MEDICINE | Facility: CLINIC | Age: 80
End: 2025-02-14

## 2025-02-14 ENCOUNTER — OUTPATIENT (OUTPATIENT)
Dept: OUTPATIENT SERVICES | Facility: HOSPITAL | Age: 80
LOS: 1 days | End: 2025-02-14
Payer: MEDICARE

## 2025-02-14 VITALS
SYSTOLIC BLOOD PRESSURE: 164 MMHG | BODY MASS INDEX: 31.52 KG/M2 | OXYGEN SATURATION: 97 % | WEIGHT: 208 LBS | TEMPERATURE: 97 F | DIASTOLIC BLOOD PRESSURE: 76 MMHG | HEIGHT: 68 IN | HEART RATE: 67 BPM

## 2025-02-14 DIAGNOSIS — Z00.00 ENCOUNTER FOR GENERAL ADULT MEDICAL EXAMINATION WITHOUT ABNORMAL FINDINGS: ICD-10-CM

## 2025-02-14 DIAGNOSIS — Z00.00 ENCOUNTER FOR GENERAL ADULT MEDICAL EXAMINATION W/OUT ABNORMAL FINDINGS: ICD-10-CM

## 2025-02-14 PROCEDURE — 99214 OFFICE O/P EST MOD 30 MIN: CPT

## 2025-02-19 DIAGNOSIS — M25.551 PAIN IN RIGHT HIP: ICD-10-CM

## 2025-02-19 DIAGNOSIS — I10 ESSENTIAL (PRIMARY) HYPERTENSION: ICD-10-CM

## 2025-02-27 ENCOUNTER — EMERGENCY (EMERGENCY)
Facility: HOSPITAL | Age: 80
LOS: 0 days | Discharge: HOME | End: 2025-02-27
Attending: EMERGENCY MEDICINE
Payer: MEDICARE

## 2025-02-27 VITALS
HEART RATE: 96 BPM | WEIGHT: 205.03 LBS | DIASTOLIC BLOOD PRESSURE: 71 MMHG | TEMPERATURE: 98 F | OXYGEN SATURATION: 96 % | HEIGHT: 68 IN | RESPIRATION RATE: 18 BRPM | SYSTOLIC BLOOD PRESSURE: 168 MMHG

## 2025-02-27 VITALS
RESPIRATION RATE: 20 BRPM | OXYGEN SATURATION: 97 % | TEMPERATURE: 98 F | SYSTOLIC BLOOD PRESSURE: 142 MMHG | HEART RATE: 65 BPM | DIASTOLIC BLOOD PRESSURE: 69 MMHG

## 2025-02-27 LAB
ALBUMIN SERPL ELPH-MCNC: 4.4 G/DL — SIGNIFICANT CHANGE UP (ref 3.5–5.2)
ALP SERPL-CCNC: 115 U/L — SIGNIFICANT CHANGE UP (ref 30–115)
ALT FLD-CCNC: 22 U/L — SIGNIFICANT CHANGE UP (ref 0–41)
ANION GAP SERPL CALC-SCNC: 11 MMOL/L — SIGNIFICANT CHANGE UP (ref 7–14)
APPEARANCE UR: CLEAR — SIGNIFICANT CHANGE UP
AST SERPL-CCNC: 32 U/L — SIGNIFICANT CHANGE UP (ref 0–41)
BACTERIA # UR AUTO: ABNORMAL /HPF
BASOPHILS # BLD AUTO: 0.03 K/UL — SIGNIFICANT CHANGE UP (ref 0–0.2)
BASOPHILS NFR BLD AUTO: 0.5 % — SIGNIFICANT CHANGE UP (ref 0–1)
BILIRUB SERPL-MCNC: 0.3 MG/DL — SIGNIFICANT CHANGE UP (ref 0.2–1.2)
BILIRUB UR-MCNC: NEGATIVE — SIGNIFICANT CHANGE UP
BUN SERPL-MCNC: 12 MG/DL — SIGNIFICANT CHANGE UP (ref 10–20)
CALCIUM SERPL-MCNC: 9.5 MG/DL — SIGNIFICANT CHANGE UP (ref 8.4–10.4)
CAST: 0 /LPF — SIGNIFICANT CHANGE UP (ref 0–4)
CHLORIDE SERPL-SCNC: 101 MMOL/L — SIGNIFICANT CHANGE UP (ref 98–110)
CO2 SERPL-SCNC: 26 MMOL/L — SIGNIFICANT CHANGE UP (ref 17–32)
COLOR SPEC: YELLOW — SIGNIFICANT CHANGE UP
CREAT SERPL-MCNC: 1.1 MG/DL — SIGNIFICANT CHANGE UP (ref 0.7–1.5)
DIFF PNL FLD: NEGATIVE — SIGNIFICANT CHANGE UP
EGFR: 69 ML/MIN/1.73M2 — SIGNIFICANT CHANGE UP
EOSINOPHIL # BLD AUTO: 0.12 K/UL — SIGNIFICANT CHANGE UP (ref 0–0.7)
EOSINOPHIL NFR BLD AUTO: 2 % — SIGNIFICANT CHANGE UP (ref 0–8)
FLUAV AG NPH QL: SIGNIFICANT CHANGE UP
FLUBV AG NPH QL: SIGNIFICANT CHANGE UP
GLUCOSE SERPL-MCNC: 90 MG/DL — SIGNIFICANT CHANGE UP (ref 70–99)
GLUCOSE UR QL: NEGATIVE MG/DL — SIGNIFICANT CHANGE UP
HCT VFR BLD CALC: 45 % — SIGNIFICANT CHANGE UP (ref 42–52)
HGB BLD-MCNC: 14.6 G/DL — SIGNIFICANT CHANGE UP (ref 14–18)
IMM GRANULOCYTES NFR BLD AUTO: 0.2 % — SIGNIFICANT CHANGE UP (ref 0.1–0.3)
KETONES UR-MCNC: NEGATIVE MG/DL — SIGNIFICANT CHANGE UP
LEUKOCYTE ESTERASE UR-ACNC: ABNORMAL
LYMPHOCYTES # BLD AUTO: 2.17 K/UL — SIGNIFICANT CHANGE UP (ref 1.2–3.4)
LYMPHOCYTES # BLD AUTO: 36.6 % — SIGNIFICANT CHANGE UP (ref 20.5–51.1)
MAGNESIUM SERPL-MCNC: 2.1 MG/DL — SIGNIFICANT CHANGE UP (ref 1.8–2.4)
MCHC RBC-ENTMCNC: 26.6 PG — LOW (ref 27–31)
MCHC RBC-ENTMCNC: 32.4 G/DL — SIGNIFICANT CHANGE UP (ref 32–37)
MCV RBC AUTO: 82 FL — SIGNIFICANT CHANGE UP (ref 80–94)
MONOCYTES # BLD AUTO: 0.58 K/UL — SIGNIFICANT CHANGE UP (ref 0.1–0.6)
MONOCYTES NFR BLD AUTO: 9.8 % — HIGH (ref 1.7–9.3)
NEUTROPHILS # BLD AUTO: 3.02 K/UL — SIGNIFICANT CHANGE UP (ref 1.4–6.5)
NEUTROPHILS NFR BLD AUTO: 50.9 % — SIGNIFICANT CHANGE UP (ref 42.2–75.2)
NITRITE UR-MCNC: POSITIVE
NRBC BLD AUTO-RTO: 0 /100 WBCS — SIGNIFICANT CHANGE UP (ref 0–0)
PH UR: 7 — SIGNIFICANT CHANGE UP (ref 5–8)
PLATELET # BLD AUTO: 200 K/UL — SIGNIFICANT CHANGE UP (ref 130–400)
PMV BLD: 10.4 FL — SIGNIFICANT CHANGE UP (ref 7.4–10.4)
POTASSIUM SERPL-MCNC: 5.2 MMOL/L — HIGH (ref 3.5–5)
POTASSIUM SERPL-SCNC: 5.2 MMOL/L — HIGH (ref 3.5–5)
PROT SERPL-MCNC: 7.1 G/DL — SIGNIFICANT CHANGE UP (ref 6–8)
PROT UR-MCNC: NEGATIVE MG/DL — SIGNIFICANT CHANGE UP
RBC # BLD: 5.49 M/UL — SIGNIFICANT CHANGE UP (ref 4.7–6.1)
RBC # FLD: 13.4 % — SIGNIFICANT CHANGE UP (ref 11.5–14.5)
RBC CASTS # UR COMP ASSIST: 1 /HPF — SIGNIFICANT CHANGE UP (ref 0–4)
RSV RNA NPH QL NAA+NON-PROBE: SIGNIFICANT CHANGE UP
SARS-COV-2 RNA SPEC QL NAA+PROBE: SIGNIFICANT CHANGE UP
SODIUM SERPL-SCNC: 138 MMOL/L — SIGNIFICANT CHANGE UP (ref 135–146)
SP GR SPEC: 1.02 — SIGNIFICANT CHANGE UP (ref 1–1.03)
SQUAMOUS # UR AUTO: 1 /HPF — SIGNIFICANT CHANGE UP (ref 0–5)
TROPONIN T, HIGH SENSITIVITY RESULT: 8 NG/L — SIGNIFICANT CHANGE UP (ref 6–21)
UROBILINOGEN FLD QL: 0.2 MG/DL — SIGNIFICANT CHANGE UP (ref 0.2–1)
WBC # BLD: 5.93 K/UL — SIGNIFICANT CHANGE UP (ref 4.8–10.8)
WBC # FLD AUTO: 5.93 K/UL — SIGNIFICANT CHANGE UP (ref 4.8–10.8)
WBC UR QL: 21 /HPF — HIGH (ref 0–5)

## 2025-02-27 PROCEDURE — 71045 X-RAY EXAM CHEST 1 VIEW: CPT

## 2025-02-27 PROCEDURE — 80053 COMPREHEN METABOLIC PANEL: CPT

## 2025-02-27 PROCEDURE — 87077 CULTURE AEROBIC IDENTIFY: CPT

## 2025-02-27 PROCEDURE — 93005 ELECTROCARDIOGRAM TRACING: CPT

## 2025-02-27 PROCEDURE — 71045 X-RAY EXAM CHEST 1 VIEW: CPT | Mod: 26

## 2025-02-27 PROCEDURE — 87086 URINE CULTURE/COLONY COUNT: CPT

## 2025-02-27 PROCEDURE — 83735 ASSAY OF MAGNESIUM: CPT

## 2025-02-27 PROCEDURE — 84484 ASSAY OF TROPONIN QUANT: CPT

## 2025-02-27 PROCEDURE — 82962 GLUCOSE BLOOD TEST: CPT

## 2025-02-27 PROCEDURE — 0241U: CPT

## 2025-02-27 PROCEDURE — 99284 EMERGENCY DEPT VISIT MOD MDM: CPT

## 2025-02-27 PROCEDURE — 93010 ELECTROCARDIOGRAM REPORT: CPT

## 2025-02-27 PROCEDURE — 85025 COMPLETE CBC W/AUTO DIFF WBC: CPT

## 2025-02-27 PROCEDURE — 96360 HYDRATION IV INFUSION INIT: CPT

## 2025-02-27 PROCEDURE — 87186 SC STD MICRODIL/AGAR DIL: CPT

## 2025-02-27 PROCEDURE — 99285 EMERGENCY DEPT VISIT HI MDM: CPT | Mod: 25

## 2025-02-27 PROCEDURE — 36415 COLL VENOUS BLD VENIPUNCTURE: CPT

## 2025-02-27 PROCEDURE — 81001 URINALYSIS AUTO W/SCOPE: CPT

## 2025-02-27 RX ORDER — ACETAMINOPHEN 160 MG/5ML
650 SUSPENSION ORAL ONCE
Refills: 0 | Status: COMPLETED | OUTPATIENT
Start: 2025-02-27 | End: 2025-02-27

## 2025-02-27 RX ORDER — SODIUM CHLORIDE 9 G/ML
1000 INJECTION, SOLUTION INTRAVENOUS ONCE
Refills: 0 | Status: COMPLETED | OUTPATIENT
Start: 2025-02-27 | End: 2025-02-27

## 2025-02-27 RX ORDER — CEFPODOXIME PROXETIL 200 MG
1 TABLET ORAL
Qty: 20 | Refills: 0
Start: 2025-02-27 | End: 2025-03-08

## 2025-02-27 RX ORDER — CEFPODOXIME PROXETIL 200 MG
200 TABLET ORAL ONCE
Refills: 0 | Status: COMPLETED | OUTPATIENT
Start: 2025-02-27 | End: 2025-02-27

## 2025-02-27 RX ADMIN — SODIUM CHLORIDE 1000 MILLILITER(S): 9 INJECTION, SOLUTION INTRAVENOUS at 14:45

## 2025-02-27 RX ADMIN — ACETAMINOPHEN 650 MILLIGRAM(S): 160 SUSPENSION ORAL at 14:45

## 2025-02-27 RX ADMIN — ACETAMINOPHEN 650 MILLIGRAM(S): 160 SUSPENSION ORAL at 16:00

## 2025-02-27 RX ADMIN — SODIUM CHLORIDE 1000 MILLILITER(S): 9 INJECTION, SOLUTION INTRAVENOUS at 16:00

## 2025-02-27 RX ADMIN — Medication 200 MILLIGRAM(S): at 17:21

## 2025-02-27 NOTE — ED PROVIDER NOTE - OBJECTIVE STATEMENT
79 year old male, past medical history htn, hdl, dm, who presents with generalized weakness. patient with body aches, headache, weakness, decreased po intake x2 weeks. no sick contacts @ home. denies f/c, chest pain, shortness of breath, vomiting/diarrhea, urinary symptoms, syncope.

## 2025-02-27 NOTE — ED PROVIDER NOTE - NSFOLLOWUPINSTRUCTIONS_ED_ALL_ED_FT
Please follow up with your primary care doctor in 1-3 days  Please be aware of any new or worsening signs or symptoms that should prompt your return to the ER.    Urinary Tract Infection    A urinary tract infection (UTI) is an infection of any part of the urinary tract, which includes the kidneys, ureters, bladder, and urethra. Risk factors include ignoring your need to urinate, wiping back to front if female, being an uncircumcised male, and having diabetes or a weak immune system. Symptoms include frequent urination, pain or burning with urination, foul smelling urine, cloudy urine, pain in the lower abdomen, blood in the urine, and fever. If you were prescribed an antibiotic medicine, take it as told by your health care provider. Do not stop taking the antibiotic even if you start to feel better.    SEEK IMMEDIATE MEDICAL CARE IF YOU HAVE THE FOLLOWING SYMPTOMS: severe back or abdominal pain, inability to keep fluids or medicine down, dizziness/lightheadedness, or a change in mental status.

## 2025-02-27 NOTE — ED PROVIDER NOTE - PATIENT PORTAL LINK FT
You can access the FollowMyHealth Patient Portal offered by [facility] by registering at the following website: [email or web address]. By joining [employer]’s FollowMyHealth portal, you will also be able to view your health information using other applications (apps) compatible with our system.

## 2025-02-27 NOTE — ED ADULT NURSE NOTE - TEMPLATE LIST FOR HEAD TO TOE ASSESSMENT
Start Diclofenac gel to foot 4 times daily as needed for pain. Use frozen water bottle three times daily for pain control rolling under foot to massage as well. Continue stretches as prescribed by PCP and if no better in 5 days follow up with podiatry.   General

## 2025-02-27 NOTE — ED ADULT NURSE NOTE - PLAN OF CARE
Yes - the patient is able to be screened
Call bell/Explanation of exam/test/Fall precautions/Bedside visitors/Position of comfort/Side rails

## 2025-02-27 NOTE — ED PROVIDER NOTE - CARE PROVIDER_API CALL
Thaddeus Gallego  Internal Medicine  [address] [place] [address]  Phone: [phone]  Fax: [phone]  Follow Up Time: 1-3 Days

## 2025-02-27 NOTE — ED PROVIDER NOTE - PHYSICAL EXAMINATION
CONSTITUTIONAL: non-toxic appearing male, nad  SKIN: skin exam is warm and dry  ENT: dry MM   CARD: S1, S2 normal, no murmur  RESP: No wheezes, rales or rhonchi. Good air movement bilaterally  ABD: soft; non-distended; non-tender.    EXT: Normal ROM.    NEURO: awake, alert, following commands, oriented, grossly unremarkable. No Focal deficits. GCS 15.   PSYCH: Cooperative

## 2025-02-27 NOTE — ED PROVIDER NOTE - PROGRESS NOTE DETAILS
patient feeling well, results discussed. patient educated on signs and symptoms to be aware of that should prompt urgent return to the er. will send abx to pharmacy. patient verbalizes understanding and is agreeable with plan.

## 2025-02-28 ENCOUNTER — APPOINTMENT (OUTPATIENT)
Dept: ORTHOPEDIC SURGERY | Facility: CLINIC | Age: 80
End: 2025-02-28
Payer: MEDICARE

## 2025-02-28 DIAGNOSIS — M16.11 UNILATERAL PRIMARY OSTEOARTHRITIS, RIGHT HIP: ICD-10-CM

## 2025-02-28 PROCEDURE — 99203 OFFICE O/P NEW LOW 30 MIN: CPT

## 2025-02-28 PROCEDURE — 73503 X-RAY EXAM HIP UNI 4/> VIEWS: CPT | Mod: RT

## 2025-03-21 ENCOUNTER — OUTPATIENT (OUTPATIENT)
Dept: OUTPATIENT SERVICES | Facility: HOSPITAL | Age: 80
LOS: 1 days | End: 2025-03-21
Payer: MEDICARE

## 2025-03-21 ENCOUNTER — APPOINTMENT (OUTPATIENT)
Dept: INTERNAL MEDICINE | Facility: CLINIC | Age: 80
End: 2025-03-21

## 2025-03-21 VITALS
DIASTOLIC BLOOD PRESSURE: 75 MMHG | TEMPERATURE: 97.3 F | HEART RATE: 99 BPM | HEIGHT: 68 IN | BODY MASS INDEX: 31.37 KG/M2 | SYSTOLIC BLOOD PRESSURE: 153 MMHG | WEIGHT: 207 LBS | OXYGEN SATURATION: 100 %

## 2025-03-21 DIAGNOSIS — I10 ESSENTIAL (PRIMARY) HYPERTENSION: ICD-10-CM

## 2025-03-21 DIAGNOSIS — E78.5 HYPERLIPIDEMIA, UNSPECIFIED: ICD-10-CM

## 2025-03-21 DIAGNOSIS — R07.9 CHEST PAIN, UNSPECIFIED: ICD-10-CM

## 2025-03-21 DIAGNOSIS — H90.3 SENSORINEURAL HEARING LOSS, BILATERAL: ICD-10-CM

## 2025-03-21 DIAGNOSIS — Z00.00 ENCOUNTER FOR GENERAL ADULT MEDICAL EXAMINATION WITHOUT ABNORMAL FINDINGS: ICD-10-CM

## 2025-03-21 DIAGNOSIS — E11.9 TYPE 2 DIABETES MELLITUS W/OUT COMPLICATIONS: ICD-10-CM

## 2025-03-21 PROCEDURE — 83036 HEMOGLOBIN GLYCOSYLATED A1C: CPT

## 2025-03-21 PROCEDURE — 82306 VITAMIN D 25 HYDROXY: CPT

## 2025-03-21 PROCEDURE — 84443 ASSAY THYROID STIM HORMONE: CPT

## 2025-03-21 PROCEDURE — 85027 COMPLETE CBC AUTOMATED: CPT

## 2025-03-21 PROCEDURE — 80053 COMPREHEN METABOLIC PANEL: CPT

## 2025-03-21 PROCEDURE — 99214 OFFICE O/P EST MOD 30 MIN: CPT

## 2025-03-21 PROCEDURE — 80061 LIPID PANEL: CPT

## 2025-03-24 LAB
ESTIMATED AVERAGE GLUCOSE: 171 MG/DL
HBA1C MFR BLD HPLC: 7.6 %
HCT VFR BLD CALC: 44.4 %
HGB BLD-MCNC: 14.2 G/DL
MCHC RBC-ENTMCNC: 26.1 PG
MCHC RBC-ENTMCNC: 32 G/DL
MCV RBC AUTO: 81.5 FL
PLATELET # BLD AUTO: 209 K/UL
PMV BLD AUTO: 0 /100 WBCS
PMV BLD: 10.3 FL
RBC # BLD: 5.45 M/UL
RBC # FLD: 13.9 %
WBC # FLD AUTO: 5.09 K/UL

## 2025-03-28 LAB
25(OH)D3 SERPL-MCNC: 27 NG/ML
ALBUMIN SERPL ELPH-MCNC: 4.3 G/DL
ALP BLD-CCNC: 108 U/L
ALT SERPL-CCNC: 27 U/L
ANION GAP SERPL CALC-SCNC: 9 MMOL/L
AST SERPL-CCNC: 22 U/L
BILIRUB SERPL-MCNC: 0.5 MG/DL
BUN SERPL-MCNC: 12 MG/DL
CALCIUM SERPL-MCNC: 9.5 MG/DL
CHLORIDE SERPL-SCNC: 105 MMOL/L
CHOLEST SERPL-MCNC: 181 MG/DL
CO2 SERPL-SCNC: 29 MMOL/L
CREAT SERPL-MCNC: 0.8 MG/DL
EGFRCR SERPLBLD CKD-EPI 2021: 91 ML/MIN/1.73M2
GLUCOSE SERPL-MCNC: 108 MG/DL
HDLC SERPL-MCNC: 61 MG/DL
LDLC SERPL-MCNC: 103 MG/DL
NONHDLC SERPL-MCNC: 120 MG/DL
POTASSIUM SERPL-SCNC: 4 MMOL/L
PROT SERPL-MCNC: 7 G/DL
SODIUM SERPL-SCNC: 143 MMOL/L
TRIGL SERPL-MCNC: 91 MG/DL
TSH SERPL-ACNC: 0.04 UIU/ML

## 2025-04-01 DIAGNOSIS — E11.9 TYPE 2 DIABETES MELLITUS WITHOUT COMPLICATIONS: ICD-10-CM

## 2025-04-01 DIAGNOSIS — R07.9 CHEST PAIN, UNSPECIFIED: ICD-10-CM

## 2025-04-01 DIAGNOSIS — H90.3 SENSORINEURAL HEARING LOSS, BILATERAL: ICD-10-CM

## 2025-04-01 DIAGNOSIS — E78.5 HYPERLIPIDEMIA, UNSPECIFIED: ICD-10-CM

## 2025-04-01 DIAGNOSIS — I10 ESSENTIAL (PRIMARY) HYPERTENSION: ICD-10-CM

## 2025-04-02 ENCOUNTER — APPOINTMENT (OUTPATIENT)
Dept: OPHTHALMOLOGY | Facility: CLINIC | Age: 80
End: 2025-04-02
Payer: MEDICARE

## 2025-04-02 ENCOUNTER — OUTPATIENT (OUTPATIENT)
Dept: OUTPATIENT SERVICES | Facility: HOSPITAL | Age: 80
LOS: 1 days | End: 2025-04-02
Payer: MEDICARE

## 2025-04-02 DIAGNOSIS — H53.8 OTHER VISUAL DISTURBANCES: ICD-10-CM

## 2025-04-02 PROCEDURE — 92133 CPTRZD OPH DX IMG PST SGM ON: CPT | Mod: 26

## 2025-04-02 PROCEDURE — 92133 CPTRZD OPH DX IMG PST SGM ON: CPT

## 2025-04-02 PROCEDURE — 92014 COMPRE OPH EXAM EST PT 1/>: CPT

## 2025-04-09 DIAGNOSIS — H35.033 HYPERTENSIVE RETINOPATHY, BILATERAL: ICD-10-CM

## 2025-04-09 DIAGNOSIS — H47.012 ISCHEMIC OPTIC NEUROPATHY, LEFT EYE: ICD-10-CM

## 2025-04-09 DIAGNOSIS — H10.13 ACUTE ATOPIC CONJUNCTIVITIS, BILATERAL: ICD-10-CM

## 2025-04-09 DIAGNOSIS — H40.1232 LOW-TENSION GLAUCOMA, BILATERAL, MODERATE STAGE: ICD-10-CM

## 2025-04-09 DIAGNOSIS — E11.9 TYPE 2 DIABETES MELLITUS WITHOUT COMPLICATIONS: ICD-10-CM

## 2025-04-09 DIAGNOSIS — H25.813 COMBINED FORMS OF AGE-RELATED CATARACT, BILATERAL: ICD-10-CM

## 2025-04-15 ENCOUNTER — APPOINTMENT (OUTPATIENT)
Dept: ORTHOPEDIC SURGERY | Facility: CLINIC | Age: 80
End: 2025-04-15
Payer: MEDICARE

## 2025-04-15 DIAGNOSIS — M16.11 UNILATERAL PRIMARY OSTEOARTHRITIS, RIGHT HIP: ICD-10-CM

## 2025-04-15 PROCEDURE — 99205 OFFICE O/P NEW HI 60 MIN: CPT

## 2025-04-24 ENCOUNTER — EMERGENCY (EMERGENCY)
Facility: HOSPITAL | Age: 80
LOS: 0 days | Discharge: HOME | End: 2025-04-25
Attending: EMERGENCY MEDICINE
Payer: MEDICARE

## 2025-04-24 VITALS
OXYGEN SATURATION: 99 % | SYSTOLIC BLOOD PRESSURE: 131 MMHG | DIASTOLIC BLOOD PRESSURE: 71 MMHG | HEIGHT: 68 IN | WEIGHT: 210.1 LBS | TEMPERATURE: 98 F | HEART RATE: 74 BPM | RESPIRATION RATE: 19 BRPM

## 2025-04-24 DIAGNOSIS — E78.5 HYPERLIPIDEMIA, UNSPECIFIED: ICD-10-CM

## 2025-04-24 DIAGNOSIS — R07.89 OTHER CHEST PAIN: ICD-10-CM

## 2025-04-24 DIAGNOSIS — E11.9 TYPE 2 DIABETES MELLITUS WITHOUT COMPLICATIONS: ICD-10-CM

## 2025-04-24 DIAGNOSIS — R03.0 ELEVATED BLOOD-PRESSURE READING, WITHOUT DIAGNOSIS OF HYPERTENSION: ICD-10-CM

## 2025-04-24 DIAGNOSIS — I10 ESSENTIAL (PRIMARY) HYPERTENSION: ICD-10-CM

## 2025-04-24 DIAGNOSIS — Z88.6 ALLERGY STATUS TO ANALGESIC AGENT: ICD-10-CM

## 2025-04-24 PROCEDURE — 93010 ELECTROCARDIOGRAM REPORT: CPT

## 2025-04-24 PROCEDURE — 80053 COMPREHEN METABOLIC PANEL: CPT

## 2025-04-24 PROCEDURE — 84484 ASSAY OF TROPONIN QUANT: CPT

## 2025-04-24 PROCEDURE — A9500: CPT

## 2025-04-24 PROCEDURE — 99285 EMERGENCY DEPT VISIT HI MDM: CPT | Mod: 25

## 2025-04-24 PROCEDURE — 71275 CT ANGIOGRAPHY CHEST: CPT

## 2025-04-24 PROCEDURE — 36415 COLL VENOUS BLD VENIPUNCTURE: CPT

## 2025-04-24 PROCEDURE — G0378: CPT

## 2025-04-24 PROCEDURE — 85025 COMPLETE CBC W/AUTO DIFF WBC: CPT

## 2025-04-24 PROCEDURE — 93005 ELECTROCARDIOGRAM TRACING: CPT

## 2025-04-24 PROCEDURE — 71045 X-RAY EXAM CHEST 1 VIEW: CPT

## 2025-04-24 PROCEDURE — 93017 CV STRESS TEST TRACING ONLY: CPT

## 2025-04-24 PROCEDURE — 99223 1ST HOSP IP/OBS HIGH 75: CPT

## 2025-04-24 PROCEDURE — 83880 ASSAY OF NATRIURETIC PEPTIDE: CPT

## 2025-04-24 PROCEDURE — 71045 X-RAY EXAM CHEST 1 VIEW: CPT | Mod: 26

## 2025-04-24 PROCEDURE — 0241U: CPT

## 2025-04-24 PROCEDURE — 78452 HT MUSCLE IMAGE SPECT MULT: CPT

## 2025-04-24 NOTE — ED ADULT TRIAGE NOTE - CHIEF COMPLAINT QUOTE
Pt presenting to ED c/o high blood pressure x 3 days, hx of HTN, has not missed a dose of medication

## 2025-04-24 NOTE — ED PROVIDER NOTE - CLINICAL SUMMARY MEDICAL DECISION MAKING FREE TEXT BOX
Presented for evaluation of elevated BP, chest pain.  Required EKG, labs and imaging.  Per chart review, discomfort the patient also has a history of PE however anticoagulation has been DC'd.  CT of the chest ordered without acute PE.  Will place patient in ED OU for further evaluation and management.

## 2025-04-24 NOTE — ED PROVIDER NOTE - PHYSICAL EXAMINATION
CONSTITUTIONAL: Well-appearing; well-nourished; in no apparent distress.   EYES: PERRL; EOM intact.   ENT: normal nose; no rhinorrhea; normal pharynx with no tonsillar hypertrophy.   NECK: Supple; non-tender; no cervical lymphadenopathy  CARDIOVASCULAR: Normal S1, S2; no murmurs, rubs, or gallops.   RESPIRATORY: Normal chest excursion with respiration; breath sounds clear and equal bilaterally; no wheezes, rhonchi, or rales.  GI/: Normal bowel sounds; non-distended; non-tender; no palpable organomegaly.   MS: No evidence of trauma or deformity.  Normal ROM in all four extremities; non-tender to palpation; distal pulses are normal.   SKIN: Normal for age and race; warm; dry; good turgor; no apparent lesions or exudate.   NEURO/PSYCH: A & O x 4; grossly unremarkable. mood and manner are appropriate. Grooming and personal hygiene are appropriate. No apparent thoughts of harm to self or others.

## 2025-04-24 NOTE — ED ADULT TRIAGE NOTE - BEFAST SCREENING
Pt has been scheduled for an [facility] EXTREMITY LOWER DUPLEX ARTERIAL  On 03/31/2022 in [facility]     Please assist w PA.    Negative

## 2025-04-24 NOTE — ED PROVIDER NOTE - ATTENDING APP SHARED VISIT CONTRIBUTION OF CARE
80-year-old male with history of HTN on lisinopril, HLD, DM presents for evaluation of 2 days of "feeling hot".  Associated with elevations in his blood pressure.  Patient is also complaining of intermittent chest pain for the past several days.  Denies shortness of breath, lower extremity pain or swelling.  VS and presenting blood pressure is 131/71, non toxic appearing, NAD, Head NCAT, MMM, neck supple, normal ROM, normal s1s2, lungs ctab, abd s/nt/nd, no guarding or rebound, extremities wnl, AAO x 3, GCS 15, neuro grossly normal. No acute skin lesions. Plan is EKG, labs occluding troponin, cardiac monitoring and dispo accordingly.

## 2025-04-25 VITALS
RESPIRATION RATE: 18 BRPM | SYSTOLIC BLOOD PRESSURE: 159 MMHG | DIASTOLIC BLOOD PRESSURE: 91 MMHG | TEMPERATURE: 98 F | OXYGEN SATURATION: 100 % | HEART RATE: 74 BPM

## 2025-04-25 LAB
ALBUMIN SERPL ELPH-MCNC: 3.9 G/DL — SIGNIFICANT CHANGE UP (ref 3.5–5.2)
ALBUMIN SERPL ELPH-MCNC: 4 G/DL — SIGNIFICANT CHANGE UP (ref 3.5–5.2)
ALP SERPL-CCNC: 106 U/L — SIGNIFICANT CHANGE UP (ref 30–115)
ALP SERPL-CCNC: 97 U/L — SIGNIFICANT CHANGE UP (ref 30–115)
ALT FLD-CCNC: 21 U/L — SIGNIFICANT CHANGE UP (ref 0–41)
ALT FLD-CCNC: 28 U/L — SIGNIFICANT CHANGE UP (ref 0–41)
ANION GAP SERPL CALC-SCNC: 10 MMOL/L — SIGNIFICANT CHANGE UP (ref 7–14)
ANION GAP SERPL CALC-SCNC: 8 MMOL/L — SIGNIFICANT CHANGE UP (ref 7–14)
AST SERPL-CCNC: 22 U/L — SIGNIFICANT CHANGE UP (ref 0–41)
AST SERPL-CCNC: 66 U/L — HIGH (ref 0–41)
BASOPHILS # BLD AUTO: 0.04 K/UL — SIGNIFICANT CHANGE UP (ref 0–0.2)
BASOPHILS NFR BLD AUTO: 0.7 % — SIGNIFICANT CHANGE UP (ref 0–1)
BILIRUB SERPL-MCNC: 0.3 MG/DL — SIGNIFICANT CHANGE UP (ref 0.2–1.2)
BILIRUB SERPL-MCNC: 0.3 MG/DL — SIGNIFICANT CHANGE UP (ref 0.2–1.2)
BUN SERPL-MCNC: 13 MG/DL — SIGNIFICANT CHANGE UP (ref 10–20)
BUN SERPL-MCNC: 14 MG/DL — SIGNIFICANT CHANGE UP (ref 10–20)
CALCIUM SERPL-MCNC: 9 MG/DL — SIGNIFICANT CHANGE UP (ref 8.4–10.5)
CALCIUM SERPL-MCNC: 9.1 MG/DL — SIGNIFICANT CHANGE UP (ref 8.4–10.5)
CHLORIDE SERPL-SCNC: 101 MMOL/L — SIGNIFICANT CHANGE UP (ref 98–110)
CHLORIDE SERPL-SCNC: 103 MMOL/L — SIGNIFICANT CHANGE UP (ref 98–110)
CO2 SERPL-SCNC: 23 MMOL/L — SIGNIFICANT CHANGE UP (ref 17–32)
CO2 SERPL-SCNC: 24 MMOL/L — SIGNIFICANT CHANGE UP (ref 17–32)
CREAT SERPL-MCNC: 0.9 MG/DL — SIGNIFICANT CHANGE UP (ref 0.7–1.5)
CREAT SERPL-MCNC: 1.1 MG/DL — SIGNIFICANT CHANGE UP (ref 0.7–1.5)
EGFR: 69 ML/MIN/1.73M2 — SIGNIFICANT CHANGE UP
EGFR: 69 ML/MIN/1.73M2 — SIGNIFICANT CHANGE UP
EGFR: 87 ML/MIN/1.73M2 — SIGNIFICANT CHANGE UP
EGFR: 87 ML/MIN/1.73M2 — SIGNIFICANT CHANGE UP
EOSINOPHIL # BLD AUTO: 0.14 K/UL — SIGNIFICANT CHANGE UP (ref 0–0.7)
EOSINOPHIL NFR BLD AUTO: 2.3 % — SIGNIFICANT CHANGE UP (ref 0–8)
FLUAV AG NPH QL: SIGNIFICANT CHANGE UP
FLUBV AG NPH QL: SIGNIFICANT CHANGE UP
GLUCOSE SERPL-MCNC: 138 MG/DL — HIGH (ref 70–99)
GLUCOSE SERPL-MCNC: 240 MG/DL — HIGH (ref 70–99)
HCT VFR BLD CALC: 39.2 % — LOW (ref 42–52)
HGB BLD-MCNC: 12.9 G/DL — LOW (ref 14–18)
IMM GRANULOCYTES NFR BLD AUTO: 0.2 % — SIGNIFICANT CHANGE UP (ref 0.1–0.3)
LYMPHOCYTES # BLD AUTO: 1.82 K/UL — SIGNIFICANT CHANGE UP (ref 1.2–3.4)
LYMPHOCYTES # BLD AUTO: 30.1 % — SIGNIFICANT CHANGE UP (ref 20.5–51.1)
MCHC RBC-ENTMCNC: 26.7 PG — LOW (ref 27–31)
MCHC RBC-ENTMCNC: 32.9 G/DL — SIGNIFICANT CHANGE UP (ref 32–37)
MCV RBC AUTO: 81 FL — SIGNIFICANT CHANGE UP (ref 80–94)
MONOCYTES # BLD AUTO: 0.52 K/UL — SIGNIFICANT CHANGE UP (ref 0.1–0.6)
MONOCYTES NFR BLD AUTO: 8.6 % — SIGNIFICANT CHANGE UP (ref 1.7–9.3)
NEUTROPHILS # BLD AUTO: 3.52 K/UL — SIGNIFICANT CHANGE UP (ref 1.4–6.5)
NEUTROPHILS NFR BLD AUTO: 58.1 % — SIGNIFICANT CHANGE UP (ref 42.2–75.2)
NRBC BLD AUTO-RTO: 0 /100 WBCS — SIGNIFICANT CHANGE UP (ref 0–0)
NT-PROBNP SERPL-SCNC: <36 PG/ML — SIGNIFICANT CHANGE UP (ref 0–300)
PLATELET # BLD AUTO: 176 K/UL — SIGNIFICANT CHANGE UP (ref 130–400)
PMV BLD: 11.5 FL — HIGH (ref 7.4–10.4)
POTASSIUM SERPL-MCNC: 3.9 MMOL/L — SIGNIFICANT CHANGE UP (ref 3.5–5)
POTASSIUM SERPL-MCNC: SIGNIFICANT CHANGE UP MMOL/L (ref 3.5–5)
POTASSIUM SERPL-SCNC: 3.9 MMOL/L — SIGNIFICANT CHANGE UP (ref 3.5–5)
POTASSIUM SERPL-SCNC: SIGNIFICANT CHANGE UP MMOL/L (ref 3.5–5)
PROT SERPL-MCNC: 6 G/DL — SIGNIFICANT CHANGE UP (ref 6–8)
PROT SERPL-MCNC: 6.8 G/DL — SIGNIFICANT CHANGE UP (ref 6–8)
RBC # BLD: 4.84 M/UL — SIGNIFICANT CHANGE UP (ref 4.7–6.1)
RBC # FLD: 13.2 % — SIGNIFICANT CHANGE UP (ref 11.5–14.5)
RSV RNA NPH QL NAA+NON-PROBE: SIGNIFICANT CHANGE UP
SARS-COV-2 RNA SPEC QL NAA+PROBE: SIGNIFICANT CHANGE UP
SODIUM SERPL-SCNC: 132 MMOL/L — LOW (ref 135–146)
SODIUM SERPL-SCNC: 137 MMOL/L — SIGNIFICANT CHANGE UP (ref 135–146)
SOURCE RESPIRATORY: SIGNIFICANT CHANGE UP
TROPONIN T, HIGH SENSITIVITY RESULT: 7 NG/L — SIGNIFICANT CHANGE UP (ref 6–21)
TROPONIN T, HIGH SENSITIVITY RESULT: 7 NG/L — SIGNIFICANT CHANGE UP (ref 6–21)
WBC # BLD: 6.05 K/UL — SIGNIFICANT CHANGE UP (ref 4.8–10.8)
WBC # FLD AUTO: 6.05 K/UL — SIGNIFICANT CHANGE UP (ref 4.8–10.8)

## 2025-04-25 PROCEDURE — 71275 CT ANGIOGRAPHY CHEST: CPT | Mod: 26

## 2025-04-25 PROCEDURE — 93016 CV STRESS TEST SUPVJ ONLY: CPT

## 2025-04-25 PROCEDURE — 93018 CV STRESS TEST I&R ONLY: CPT

## 2025-04-25 PROCEDURE — 99239 HOSP IP/OBS DSCHRG MGMT >30: CPT

## 2025-04-25 PROCEDURE — 78452 HT MUSCLE IMAGE SPECT MULT: CPT | Mod: 26

## 2025-04-25 RX ORDER — REGADENOSON 0.08 MG/ML
0.4 INJECTION, SOLUTION INTRAVENOUS ONCE
Refills: 0 | Status: DISCONTINUED | OUTPATIENT
Start: 2025-04-25 | End: 2025-04-25

## 2025-04-25 RX ORDER — ADENOSINE 3 MG/ML
60 INJECTION, SOLUTION INTRAVENOUS ONCE
Refills: 0 | Status: DISCONTINUED | OUTPATIENT
Start: 2025-04-25 | End: 2025-04-25

## 2025-04-25 NOTE — ED CDU PROVIDER DISPOSITION NOTE - CARE PROVIDER_API CALL
Nahum Brito Y  Interventional Cardiology  [address] [place] [address], [address]  [place] [address]  Phone: [phone]  Fax: [phone]  Follow Up Time:

## 2025-04-25 NOTE — ED CDU PROVIDER INITIAL DAY NOTE - PHYSICAL EXAMINATION
CONSTITUTIONAL: Well-appearing; well-nourished; in no apparent distress.   EYES: PERRL; EOM intact.   ENT: normal nose; no rhinorrhea; normal pharynx with no tonsillar hypertrophy.   NECK: Supple; non-tender; no cervical lymphadenopathy  CARDIOVASCULAR: Normal S1, S2; no murmurs, rubs, or gallops.   RESPIRATORY: Normal chest excursion with respiration; breath sounds clear and equal bilaterally; no wheezes, rhonchi, or rales.  GI/: Normal bowel sounds; non-distended; non-tender; no palpable organomegaly.   MS: No evidence of trauma or deformity.  Normal ROM in all four extremities; non-tender to palpation; distal pulses are normal.   SKIN: Normal for age and race; warm; dry; good turgor; no apparent lesions or exudate.   NEURO/PSYCH: A & O x 4; grossly unremarkable. mood and manner are appropriate. Grooming and personal hygiene are appropriate. No apparent thoughts of harm to self or others.
Warm/Dry

## 2025-04-25 NOTE — ED CDU PROVIDER DISPOSITION NOTE - PATIENT PORTAL LINK FT
You can access the FollowMyHealth Patient Portal offered by [facility] by registering at the following website: [email or web address]. By joining [employer]’s FollowMyHealth portal, you will also be able to view your health information using other applications (apps) compatible with our system.

## 2025-04-25 NOTE — ED CDU PROVIDER INITIAL DAY NOTE - PROGRESS NOTE DETAILS
Patient completed nuclear stress test which was negative.  Results discussed with patient.  Patient states chest pain has improved significantly and is comfortable with plan for discharge home.  Will give rapid referral for cardiology because patient does not have a cardiologist at this time.

## 2025-04-25 NOTE — ED ADULT NURSE REASSESSMENT NOTE - NS ED NURSE REASSESS COMMENT FT1
Pt assessed. Pt resting comfortably. Pt on cardiac monitor. Pt denies chest pain or discomfort at this time.

## 2025-04-25 NOTE — ED CDU PROVIDER INITIAL DAY NOTE - NSICDXPASTMEDICALHX_GEN_ALL_CORE_FT
show
PAST MEDICAL HISTORY:  Glaucoma     HLD (hyperlipidemia)     HTN (hypertension)     Pulmonary embolism     Vitamin D deficiency

## 2025-04-25 NOTE — ED CDU PROVIDER DISPOSITION NOTE - CLINICAL COURSE
80-year-old male history of hypertension, hyperlipidemia, diabetes presenting to your with 2 days of elevated blood pressure and "feeling hot".  Patient states has been while has been having mild intermittent chest pain for the past few days. While in the ED he had chest x-ray, troponin, EKG which were within normal limits.  Given patient's age and symptoms persisting he was placed in observation for nuclear stress test.  This morning patient had mild intermittent chest pressure.  Patient had normal stress test. Feeling significantly better and ready to go home.

## 2025-04-25 NOTE — ED CDU PROVIDER DISPOSITION NOTE - NSFOLLOWUPINSTRUCTIONS_ED_ALL_ED_FT
Our Emergency Department Referral Coordinators will be reaching out to you in the next 24-48 hours from 9:00am to 5:00pm to schedule a follow up appointment. Please expect a phone call from the hospital in that time frame. If you do not receive a call or if you have any questions or concerns, you can reach them at   [phone].    Chest Pain    Chest pain can be caused by many different conditions which may or may not be dangerous. Causes include heartburn, lung infections, heart attack, blood clot in lungs, skin infections, strain or damage to muscle, cartilage, or bones, etc. Lab tests or other studies including an electrocardiogram (EKG) may have been performed to find the cause of your pain. Make sure to follow up with a cardiologist or as instructed by your health care professional.    SEEK IMMEDIATE MEDICAL CARE IF YOU HAVE THE FOLLOWING SYMPTOMS: worsening chest pain, coughing up blood, unexplained back/neck/jaw pain, severe abdominal pain, dizziness or lightheadedness, shortness of breath, sweaty or clammy skin, vomiting, or racing heart beat. These symptoms may represent a serious problem that is an emergency. Do not wait to see if the symptoms will go away. Get medical help right away. Call your local emergency services (911 in the [place] Do not drive yourself to the hospital.

## 2025-04-25 NOTE — ED CDU PROVIDER INITIAL DAY NOTE - OBJECTIVE STATEMENT
80-year-old male history of hypertension, hyperlipidemia, diabetes presenting to your with 2 days of elevated blood pressure and "feeling hot.  ".  Patient states has been while has been having mild intermittent chest pain for the past few days.  He has no associated shortness of breath, nausea, vomiting, diarrhea, abdominal pain, lower extremity pain or swelling, cough, lightheadedness/dizziness/syncope.

## 2025-05-01 ENCOUNTER — NON-APPOINTMENT (OUTPATIENT)
Age: 80
End: 2025-05-01

## 2025-05-09 ENCOUNTER — EMERGENCY (EMERGENCY)
Facility: HOSPITAL | Age: 80
LOS: 0 days | Discharge: HOME | End: 2025-05-10
Attending: EMERGENCY MEDICINE
Payer: MEDICARE

## 2025-05-09 VITALS
TEMPERATURE: 98 F | RESPIRATION RATE: 18 BRPM | SYSTOLIC BLOOD PRESSURE: 162 MMHG | OXYGEN SATURATION: 95 % | WEIGHT: 210.1 LBS | HEIGHT: 68 IN | DIASTOLIC BLOOD PRESSURE: 75 MMHG | HEART RATE: 87 BPM

## 2025-05-09 DIAGNOSIS — Z88.6 ALLERGY STATUS TO ANALGESIC AGENT: ICD-10-CM

## 2025-05-09 DIAGNOSIS — I10 ESSENTIAL (PRIMARY) HYPERTENSION: ICD-10-CM

## 2025-05-09 DIAGNOSIS — Z79.01 LONG TERM (CURRENT) USE OF ANTICOAGULANTS: ICD-10-CM

## 2025-05-09 DIAGNOSIS — E78.5 HYPERLIPIDEMIA, UNSPECIFIED: ICD-10-CM

## 2025-05-09 DIAGNOSIS — R07.2 PRECORDIAL PAIN: ICD-10-CM

## 2025-05-09 DIAGNOSIS — Z86.711 PERSONAL HISTORY OF PULMONARY EMBOLISM: ICD-10-CM

## 2025-05-09 DIAGNOSIS — D64.9 ANEMIA, UNSPECIFIED: ICD-10-CM

## 2025-05-09 DIAGNOSIS — N40.0 BENIGN PROSTATIC HYPERPLASIA WITHOUT LOWER URINARY TRACT SYMPTOMS: ICD-10-CM

## 2025-05-09 DIAGNOSIS — E11.9 TYPE 2 DIABETES MELLITUS WITHOUT COMPLICATIONS: ICD-10-CM

## 2025-05-09 LAB
ALBUMIN SERPL ELPH-MCNC: 4.1 G/DL — SIGNIFICANT CHANGE UP (ref 3.5–5.2)
ALP SERPL-CCNC: 102 U/L — SIGNIFICANT CHANGE UP (ref 30–115)
ALT FLD-CCNC: 19 U/L — SIGNIFICANT CHANGE UP (ref 0–41)
ANION GAP SERPL CALC-SCNC: 10 MMOL/L — SIGNIFICANT CHANGE UP (ref 7–14)
AST SERPL-CCNC: 20 U/L — SIGNIFICANT CHANGE UP (ref 0–41)
BASOPHILS # BLD AUTO: 0.02 K/UL — SIGNIFICANT CHANGE UP (ref 0–0.2)
BASOPHILS NFR BLD AUTO: 0.3 % — SIGNIFICANT CHANGE UP (ref 0–1)
BILIRUB SERPL-MCNC: 0.4 MG/DL — SIGNIFICANT CHANGE UP (ref 0.2–1.2)
BUN SERPL-MCNC: 10 MG/DL — SIGNIFICANT CHANGE UP (ref 10–20)
CALCIUM SERPL-MCNC: 9.3 MG/DL — SIGNIFICANT CHANGE UP (ref 8.4–10.5)
CHLORIDE SERPL-SCNC: 103 MMOL/L — SIGNIFICANT CHANGE UP (ref 98–110)
CO2 SERPL-SCNC: 25 MMOL/L — SIGNIFICANT CHANGE UP (ref 17–32)
CREAT SERPL-MCNC: 0.9 MG/DL — SIGNIFICANT CHANGE UP (ref 0.7–1.5)
EGFR: 87 ML/MIN/1.73M2 — SIGNIFICANT CHANGE UP
EGFR: 87 ML/MIN/1.73M2 — SIGNIFICANT CHANGE UP
EOSINOPHIL # BLD AUTO: 0.06 K/UL — SIGNIFICANT CHANGE UP (ref 0–0.7)
EOSINOPHIL NFR BLD AUTO: 0.9 % — SIGNIFICANT CHANGE UP (ref 0–8)
GLUCOSE SERPL-MCNC: 140 MG/DL — HIGH (ref 70–99)
HCT VFR BLD CALC: 40.7 % — LOW (ref 42–52)
HGB BLD-MCNC: 13.4 G/DL — LOW (ref 14–18)
IMM GRANULOCYTES NFR BLD AUTO: 0.3 % — SIGNIFICANT CHANGE UP (ref 0.1–0.3)
LYMPHOCYTES # BLD AUTO: 1.53 K/UL — SIGNIFICANT CHANGE UP (ref 1.2–3.4)
LYMPHOCYTES # BLD AUTO: 22.6 % — SIGNIFICANT CHANGE UP (ref 20.5–51.1)
MAGNESIUM SERPL-MCNC: 1.7 MG/DL — LOW (ref 1.8–2.4)
MCHC RBC-ENTMCNC: 26.3 PG — LOW (ref 27–31)
MCHC RBC-ENTMCNC: 32.9 G/DL — SIGNIFICANT CHANGE UP (ref 32–37)
MCV RBC AUTO: 79.8 FL — LOW (ref 80–94)
MONOCYTES # BLD AUTO: 0.49 K/UL — SIGNIFICANT CHANGE UP (ref 0.1–0.6)
MONOCYTES NFR BLD AUTO: 7.2 % — SIGNIFICANT CHANGE UP (ref 1.7–9.3)
NEUTROPHILS # BLD AUTO: 4.66 K/UL — SIGNIFICANT CHANGE UP (ref 1.4–6.5)
NEUTROPHILS NFR BLD AUTO: 68.7 % — SIGNIFICANT CHANGE UP (ref 42.2–75.2)
NRBC BLD AUTO-RTO: 0 /100 WBCS — SIGNIFICANT CHANGE UP (ref 0–0)
NT-PROBNP SERPL-SCNC: 48 PG/ML — SIGNIFICANT CHANGE UP (ref 0–300)
PLATELET # BLD AUTO: 190 K/UL — SIGNIFICANT CHANGE UP (ref 130–400)
PMV BLD: 10.1 FL — SIGNIFICANT CHANGE UP (ref 7.4–10.4)
POTASSIUM SERPL-MCNC: 3.7 MMOL/L — SIGNIFICANT CHANGE UP (ref 3.5–5)
POTASSIUM SERPL-SCNC: 3.7 MMOL/L — SIGNIFICANT CHANGE UP (ref 3.5–5)
PROT SERPL-MCNC: 6.4 G/DL — SIGNIFICANT CHANGE UP (ref 6–8)
RBC # BLD: 5.1 M/UL — SIGNIFICANT CHANGE UP (ref 4.7–6.1)
RBC # FLD: 13.2 % — SIGNIFICANT CHANGE UP (ref 11.5–14.5)
SODIUM SERPL-SCNC: 138 MMOL/L — SIGNIFICANT CHANGE UP (ref 135–146)
TROPONIN T, HIGH SENSITIVITY RESULT: 9 NG/L — SIGNIFICANT CHANGE UP (ref 6–21)
TROPONIN T, HIGH SENSITIVITY RESULT: 9 NG/L — SIGNIFICANT CHANGE UP (ref 6–21)
WBC # BLD: 6.78 K/UL — SIGNIFICANT CHANGE UP (ref 4.8–10.8)
WBC # FLD AUTO: 6.78 K/UL — SIGNIFICANT CHANGE UP (ref 4.8–10.8)

## 2025-05-09 PROCEDURE — 83735 ASSAY OF MAGNESIUM: CPT

## 2025-05-09 PROCEDURE — 71045 X-RAY EXAM CHEST 1 VIEW: CPT

## 2025-05-09 PROCEDURE — 80053 COMPREHEN METABOLIC PANEL: CPT

## 2025-05-09 PROCEDURE — 71045 X-RAY EXAM CHEST 1 VIEW: CPT | Mod: 26

## 2025-05-09 PROCEDURE — 93010 ELECTROCARDIOGRAM REPORT: CPT

## 2025-05-09 PROCEDURE — 75574 CT ANGIO HRT W/3D IMAGE: CPT

## 2025-05-09 PROCEDURE — 93005 ELECTROCARDIOGRAM TRACING: CPT

## 2025-05-09 PROCEDURE — 83880 ASSAY OF NATRIURETIC PEPTIDE: CPT

## 2025-05-09 PROCEDURE — 36415 COLL VENOUS BLD VENIPUNCTURE: CPT

## 2025-05-09 PROCEDURE — 99223 1ST HOSP IP/OBS HIGH 75: CPT

## 2025-05-09 PROCEDURE — 99285 EMERGENCY DEPT VISIT HI MDM: CPT | Mod: 25

## 2025-05-09 PROCEDURE — G0378: CPT

## 2025-05-09 PROCEDURE — 84484 ASSAY OF TROPONIN QUANT: CPT

## 2025-05-09 PROCEDURE — 85025 COMPLETE CBC W/AUTO DIFF WBC: CPT

## 2025-05-09 RX ORDER — MAGNESIUM OXIDE 400 MG
400 TABLET ORAL ONCE
Refills: 0 | Status: COMPLETED | OUTPATIENT
Start: 2025-05-09 | End: 2025-05-09

## 2025-05-09 RX ADMIN — Medication 400 MILLIGRAM(S): at 21:27

## 2025-05-10 VITALS
DIASTOLIC BLOOD PRESSURE: 86 MMHG | SYSTOLIC BLOOD PRESSURE: 167 MMHG | HEART RATE: 61 BPM | TEMPERATURE: 98 F | OXYGEN SATURATION: 98 % | RESPIRATION RATE: 18 BRPM

## 2025-05-10 PROCEDURE — 75574 CT ANGIO HRT W/3D IMAGE: CPT | Mod: 26

## 2025-05-10 PROCEDURE — 99239 HOSP IP/OBS DSCHRG MGMT >30: CPT

## 2025-05-10 RX ORDER — METOPROLOL SUCCINATE 50 MG/1
100 TABLET, EXTENDED RELEASE ORAL ONCE
Refills: 0 | Status: COMPLETED | OUTPATIENT
Start: 2025-05-10 | End: 2025-05-10

## 2025-05-10 RX ADMIN — METOPROLOL SUCCINATE 100 MILLIGRAM(S): 50 TABLET, EXTENDED RELEASE ORAL at 06:53

## 2025-05-13 ENCOUNTER — APPOINTMENT (OUTPATIENT)
Dept: OPHTHALMOLOGY | Facility: CLINIC | Age: 80
End: 2025-05-13

## 2025-05-13 ENCOUNTER — OUTPATIENT (OUTPATIENT)
Dept: OUTPATIENT SERVICES | Facility: HOSPITAL | Age: 80
LOS: 1 days | End: 2025-05-13
Payer: MEDICARE

## 2025-05-13 DIAGNOSIS — H53.8 OTHER VISUAL DISTURBANCES: ICD-10-CM

## 2025-05-13 PROCEDURE — 92134 CPTRZ OPH DX IMG PST SGM RTA: CPT

## 2025-05-13 PROCEDURE — 99214 OFFICE O/P EST MOD 30 MIN: CPT

## 2025-05-15 DIAGNOSIS — H40.1232 LOW-TENSION GLAUCOMA, BILATERAL, MODERATE STAGE: ICD-10-CM

## 2025-05-15 DIAGNOSIS — H25.13 AGE-RELATED NUCLEAR CATARACT, BILATERAL: ICD-10-CM

## 2025-05-19 ENCOUNTER — RESULT CHARGE (OUTPATIENT)
Age: 80
End: 2025-05-19

## 2025-05-19 ENCOUNTER — APPOINTMENT (OUTPATIENT)
Dept: CARDIOLOGY | Facility: CLINIC | Age: 80
End: 2025-05-19
Payer: MEDICARE

## 2025-05-19 VITALS
HEART RATE: 81 BPM | SYSTOLIC BLOOD PRESSURE: 168 MMHG | BODY MASS INDEX: 31.37 KG/M2 | HEIGHT: 68 IN | DIASTOLIC BLOOD PRESSURE: 74 MMHG | WEIGHT: 207 LBS

## 2025-05-19 DIAGNOSIS — I10 ESSENTIAL (PRIMARY) HYPERTENSION: ICD-10-CM

## 2025-05-19 DIAGNOSIS — I25.10 ATHEROSCLEROTIC HEART DISEASE OF NATIVE CORONARY ARTERY W/OUT ANGINA PECTORIS: ICD-10-CM

## 2025-05-19 DIAGNOSIS — E11.9 TYPE 2 DIABETES MELLITUS W/OUT COMPLICATIONS: ICD-10-CM

## 2025-05-19 DIAGNOSIS — R07.9 CHEST PAIN, UNSPECIFIED: ICD-10-CM

## 2025-05-19 DIAGNOSIS — E78.5 HYPERLIPIDEMIA, UNSPECIFIED: ICD-10-CM

## 2025-05-19 DIAGNOSIS — F10.90 ALCOHOL USE, UNSPECIFIED, UNCOMPLICATED: ICD-10-CM

## 2025-05-19 PROCEDURE — 99204 OFFICE O/P NEW MOD 45 MIN: CPT

## 2025-05-19 PROCEDURE — G2211 COMPLEX E/M VISIT ADD ON: CPT

## 2025-05-19 PROCEDURE — 93000 ELECTROCARDIOGRAM COMPLETE: CPT

## 2025-05-19 RX ORDER — CHLORTHALIDONE 25 MG/1
25 TABLET ORAL DAILY
Qty: 90 | Refills: 1 | Status: ACTIVE | COMMUNITY
Start: 2025-05-19 | End: 1900-01-01

## 2025-06-05 ENCOUNTER — APPOINTMENT (OUTPATIENT)
Dept: CARDIOLOGY | Facility: CLINIC | Age: 80
End: 2025-06-05

## 2025-06-12 ENCOUNTER — APPOINTMENT (OUTPATIENT)
Dept: OTOLARYNGOLOGY | Facility: CLINIC | Age: 80
End: 2025-06-12
Payer: MEDICARE

## 2025-06-12 VITALS — BODY MASS INDEX: 31.37 KG/M2 | WEIGHT: 207 LBS | HEIGHT: 68 IN

## 2025-06-12 PROCEDURE — 92557 COMPREHENSIVE HEARING TEST: CPT

## 2025-06-12 PROCEDURE — 92567 TYMPANOMETRY: CPT

## 2025-06-12 PROCEDURE — 99213 OFFICE O/P EST LOW 20 MIN: CPT | Mod: 25

## 2025-06-17 ENCOUNTER — RESULT REVIEW (OUTPATIENT)
Age: 80
End: 2025-06-17

## 2025-06-17 ENCOUNTER — OUTPATIENT (OUTPATIENT)
Dept: OUTPATIENT SERVICES | Facility: HOSPITAL | Age: 80
LOS: 1 days | End: 2025-06-17
Payer: MEDICARE

## 2025-06-17 VITALS
HEART RATE: 65 BPM | RESPIRATION RATE: 16 BRPM | OXYGEN SATURATION: 99 % | HEIGHT: 68 IN | TEMPERATURE: 98 F | DIASTOLIC BLOOD PRESSURE: 93 MMHG | SYSTOLIC BLOOD PRESSURE: 153 MMHG | WEIGHT: 207.23 LBS

## 2025-06-17 DIAGNOSIS — M16.11 UNILATERAL PRIMARY OSTEOARTHRITIS, RIGHT HIP: ICD-10-CM

## 2025-06-17 DIAGNOSIS — Z98.890 OTHER SPECIFIED POSTPROCEDURAL STATES: Chronic | ICD-10-CM

## 2025-06-17 DIAGNOSIS — Z01.818 ENCOUNTER FOR OTHER PREPROCEDURAL EXAMINATION: ICD-10-CM

## 2025-06-17 LAB
A1C WITH ESTIMATED AVERAGE GLUCOSE RESULT: 8.4 % — HIGH (ref 4–5.6)
ALBUMIN SERPL ELPH-MCNC: 4.3 G/DL — SIGNIFICANT CHANGE UP (ref 3.5–5.2)
ALP SERPL-CCNC: 114 U/L — SIGNIFICANT CHANGE UP (ref 30–115)
ALT FLD-CCNC: 24 U/L — SIGNIFICANT CHANGE UP (ref 0–41)
ANION GAP SERPL CALC-SCNC: 14 MMOL/L — SIGNIFICANT CHANGE UP (ref 7–14)
APTT BLD: 31.4 SEC — SIGNIFICANT CHANGE UP (ref 27–39.2)
AST SERPL-CCNC: 24 U/L — SIGNIFICANT CHANGE UP (ref 0–41)
BILIRUB SERPL-MCNC: 0.4 MG/DL — SIGNIFICANT CHANGE UP (ref 0.2–1.2)
BLD GP AB SCN SERPL QL: SIGNIFICANT CHANGE UP
BUN SERPL-MCNC: 16 MG/DL — SIGNIFICANT CHANGE UP (ref 10–20)
CALCIUM SERPL-MCNC: 9.7 MG/DL — SIGNIFICANT CHANGE UP (ref 8.4–10.5)
CHLORIDE SERPL-SCNC: 100 MMOL/L — SIGNIFICANT CHANGE UP (ref 98–110)
CO2 SERPL-SCNC: 26 MMOL/L — SIGNIFICANT CHANGE UP (ref 17–32)
CREAT SERPL-MCNC: 1 MG/DL — SIGNIFICANT CHANGE UP (ref 0.7–1.5)
EGFR: 77 ML/MIN/1.73M2 — SIGNIFICANT CHANGE UP
EGFR: 77 ML/MIN/1.73M2 — SIGNIFICANT CHANGE UP
ESTIMATED AVERAGE GLUCOSE: 194 MG/DL — HIGH (ref 68–114)
GLUCOSE SERPL-MCNC: 133 MG/DL — HIGH (ref 70–99)
INR BLD: 0.91 RATIO — SIGNIFICANT CHANGE UP (ref 0.65–1.3)
MRSA PCR RESULT.: NEGATIVE — SIGNIFICANT CHANGE UP
POTASSIUM SERPL-MCNC: 3.2 MMOL/L — LOW (ref 3.5–5)
POTASSIUM SERPL-SCNC: 3.2 MMOL/L — LOW (ref 3.5–5)
PROT SERPL-MCNC: 7 G/DL — SIGNIFICANT CHANGE UP (ref 6–8)
PROTHROM AB SERPL-ACNC: 10.7 SEC — SIGNIFICANT CHANGE UP (ref 9.95–12.87)
SODIUM SERPL-SCNC: 140 MMOL/L — SIGNIFICANT CHANGE UP (ref 135–146)

## 2025-06-17 PROCEDURE — 85610 PROTHROMBIN TIME: CPT

## 2025-06-17 PROCEDURE — 87641 MR-STAPH DNA AMP PROBE: CPT

## 2025-06-17 PROCEDURE — 86900 BLOOD TYPING SEROLOGIC ABO: CPT

## 2025-06-17 PROCEDURE — 85730 THROMBOPLASTIN TIME PARTIAL: CPT

## 2025-06-17 PROCEDURE — 80053 COMPREHEN METABOLIC PANEL: CPT

## 2025-06-17 PROCEDURE — 73502 X-RAY EXAM HIP UNI 2-3 VIEWS: CPT | Mod: 26,RT

## 2025-06-17 PROCEDURE — 86901 BLOOD TYPING SEROLOGIC RH(D): CPT

## 2025-06-17 PROCEDURE — 87640 STAPH A DNA AMP PROBE: CPT

## 2025-06-17 PROCEDURE — 85025 COMPLETE CBC W/AUTO DIFF WBC: CPT

## 2025-06-17 PROCEDURE — 99214 OFFICE O/P EST MOD 30 MIN: CPT | Mod: 25

## 2025-06-17 PROCEDURE — 73502 X-RAY EXAM HIP UNI 2-3 VIEWS: CPT | Mod: RT

## 2025-06-17 PROCEDURE — 36415 COLL VENOUS BLD VENIPUNCTURE: CPT

## 2025-06-17 PROCEDURE — 93010 ELECTROCARDIOGRAM REPORT: CPT

## 2025-06-17 PROCEDURE — 86850 RBC ANTIBODY SCREEN: CPT

## 2025-06-17 PROCEDURE — 83036 HEMOGLOBIN GLYCOSYLATED A1C: CPT

## 2025-06-17 PROCEDURE — 93005 ELECTROCARDIOGRAM TRACING: CPT

## 2025-06-17 NOTE — H&P PST ADULT - HISTORY OF PRESENT ILLNESS
79 y/o male presents to PAST in preparation for right total hip replacement in [facility] under regional with Dr. Cuevas on 7/2/25     Pt has had  right hip pain secondary to arthritis for the past few years that progressivly getting worse. Pt reports pain of 4/10, achining in nature, non-radiating. Pt has tried conservative measures with minimal relief. Pt now for above procedure.    PMH of HTN, DM, HLD. Pt is seen s/p ER visit due to chest pain and Hypertension. Had multiple ER visit in the past due to HTN and chest pain. Pt followed by Dr. Brito cardiologist, last ER visit for chest pain was 5/9/205.   Pt with hx of PE in 2021, completed Eliquis, currently not on DOAC's.    PATIENT CURRENTLY DENIES CHEST PAIN  SHORTNESS OF BREATH  PALPITATIONS,  DYSURIA, OR UPPER RESPIRATORY INFECTION IN PAST 2 WEEKS  Patient understands instructions and was given the opportunity to ask questions and have them answered.  As per patient, this is their complete medical and surgical history, including medications both prescribed or over the counter.  written and verbal instructions with teach back on chlorhexidine shampoo provided,  pt verbalized understanding with returned demonstration    Anesthesia Alert  NO--Difficult Airway  NO--History of neck surgery or radiation  NO--Limited ROM of neck  NO--History of Malignant hyperthermia  NO--Personal or family history of Pseudocholinesterase deficiency.  NO--Prior Anesthesia Complication  NO--Latex Allergy  NO--Loose teeth  NO--History of Rheumatoid Arthritis  NO--BRAEDEN  NO--Bleeding risk  NO--Other_____  Mallampati airway: Class IV     [facility] Activity Status Index (DASI)     RESULT SUMMARY:  58.2 points  The higher the score (maximum 58.2), the higher the functional status.    9.89 METs        INPUTS:  Take care of self —> 2.75 = Yes  Walk indoors —> 1.75 = Yes  Walk 1&ndash;2 blocks on level ground —> 2.75 = Yes  Climb a flight of stairs or walk up a hill —> 5.5 = Yes  Run a short distance —> 8 = Yes  Do light work around the house —> 2.7 = Yes  Do moderate work around the house —> 3.5 = Yes  Do heavy work around the house —> 8 = Yes  Do yardwork —> 4.5 = Yes  Have sexual relations —> 5.25 = Yes  Participate in moderate recreational activities —> 6 = Yes  Participate in strenuous sports —> 7.5 = Yes    Revised Cardiac Risk Index for Pre-Operative Risk     RESULT SUMMARY:  1 points  RCRI Score    6.0 %  Risk of major cardiac event      INPUTS:  High-risk surgery —> 0 = No  History of ischemic heart disease —> 1 = Yes  History of congestive heart failure —> 0 = No  History of cerebrovascular disease —> 0 = No  Pre-operative treatment with insulin —> 0 = No  Pre-operative creatinine >2 mg/dL / 176.8 µmol/L —> 0 = No

## 2025-06-17 NOTE — H&P PST ADULT - REASON FOR ADMISSION
79 y/o male presents to PAST in preparation for right total hip replacement in [facility] under regional with Dr. Cuevas on 7/2/25

## 2025-06-17 NOTE — H&P PST ADULT - NSICDXPASTMEDICALHX_GEN_ALL_CORE_FT
PAST MEDICAL HISTORY:  DM (diabetes mellitus)     Glaucoma     HLD (hyperlipidemia)     HTN (hypertension)     Pulmonary embolism     Vitamin D deficiency

## 2025-06-18 DIAGNOSIS — M16.11 UNILATERAL PRIMARY OSTEOARTHRITIS, RIGHT HIP: ICD-10-CM

## 2025-06-18 DIAGNOSIS — Z01.818 ENCOUNTER FOR OTHER PREPROCEDURAL EXAMINATION: ICD-10-CM

## 2025-06-18 PROBLEM — E11.9 TYPE 2 DIABETES MELLITUS WITHOUT COMPLICATIONS: Chronic | Status: ACTIVE | Noted: 2025-06-17

## 2025-06-18 LAB
BASOPHILS # BLD AUTO: 0.02 K/UL — SIGNIFICANT CHANGE UP (ref 0–0.2)
BASOPHILS NFR BLD AUTO: 0.4 % — SIGNIFICANT CHANGE UP (ref 0–2)
EOSINOPHIL # BLD AUTO: 0.1 K/UL — SIGNIFICANT CHANGE UP (ref 0–0.5)
EOSINOPHIL NFR BLD AUTO: 2 % — SIGNIFICANT CHANGE UP (ref 0–6)
HCT VFR BLD CALC: 44.2 % — SIGNIFICANT CHANGE UP (ref 39–50)
HGB BLD-MCNC: 14.8 G/DL — SIGNIFICANT CHANGE UP (ref 13–17)
IMM GRANULOCYTES NFR BLD AUTO: 0.6 % — SIGNIFICANT CHANGE UP (ref 0–0.9)
LYMPHOCYTES # BLD AUTO: 1.68 K/UL — SIGNIFICANT CHANGE UP (ref 1–3.3)
LYMPHOCYTES # BLD AUTO: 34.3 % — SIGNIFICANT CHANGE UP (ref 13–44)
MCHC RBC-ENTMCNC: 26.4 PG — LOW (ref 27–34)
MCHC RBC-ENTMCNC: 33.5 G/DL — SIGNIFICANT CHANGE UP (ref 32–36)
MCV RBC AUTO: 78.9 FL — LOW (ref 80–100)
MONOCYTES # BLD AUTO: 0.46 K/UL — SIGNIFICANT CHANGE UP (ref 0–0.9)
MONOCYTES NFR BLD AUTO: 9.4 % — SIGNIFICANT CHANGE UP (ref 2–14)
NEUTROPHILS # BLD AUTO: 2.61 K/UL — SIGNIFICANT CHANGE UP (ref 1.8–7.4)
NEUTROPHILS NFR BLD AUTO: 53.3 % — SIGNIFICANT CHANGE UP (ref 43–77)
PLATELET # BLD AUTO: 228 K/UL — SIGNIFICANT CHANGE UP (ref 150–400)
RBC # BLD: 5.6 M/UL — SIGNIFICANT CHANGE UP (ref 4.2–5.8)
RBC # FLD: 13.4 % — SIGNIFICANT CHANGE UP (ref 10.3–14.5)
WBC # BLD: 4.9 K/UL — SIGNIFICANT CHANGE UP (ref 3.8–10.5)
WBC # FLD AUTO: 4.9 K/UL — SIGNIFICANT CHANGE UP (ref 3.8–10.5)

## 2025-06-20 ENCOUNTER — APPOINTMENT (OUTPATIENT)
Dept: INTERNAL MEDICINE | Facility: CLINIC | Age: 80
End: 2025-06-20

## 2025-06-20 ENCOUNTER — OUTPATIENT (OUTPATIENT)
Dept: OUTPATIENT SERVICES | Facility: HOSPITAL | Age: 80
LOS: 1 days | End: 2025-06-20

## 2025-06-20 VITALS
WEIGHT: 206 LBS | DIASTOLIC BLOOD PRESSURE: 73 MMHG | HEART RATE: 73 BPM | BODY MASS INDEX: 31.22 KG/M2 | HEIGHT: 68 IN | TEMPERATURE: 98 F | OXYGEN SATURATION: 98 % | SYSTOLIC BLOOD PRESSURE: 138 MMHG

## 2025-06-20 DIAGNOSIS — Z98.890 OTHER SPECIFIED POSTPROCEDURAL STATES: Chronic | ICD-10-CM

## 2025-06-20 DIAGNOSIS — Z00.00 ENCOUNTER FOR GENERAL ADULT MEDICAL EXAMINATION WITHOUT ABNORMAL FINDINGS: ICD-10-CM

## 2025-06-20 PROBLEM — E87.6 HYPOKALEMIA: Status: ACTIVE | Noted: 2025-06-20

## 2025-06-20 PROCEDURE — 99215 OFFICE O/P EST HI 40 MIN: CPT

## 2025-06-20 RX ORDER — POTASSIUM CHLORIDE 600 MG/1
8 TABLET, FILM COATED, EXTENDED RELEASE ORAL DAILY
Qty: 14 | Refills: 0 | Status: ACTIVE | COMMUNITY
Start: 2025-06-20 | End: 1900-01-01

## 2025-06-23 ENCOUNTER — APPOINTMENT (OUTPATIENT)
Dept: CARDIOLOGY | Facility: CLINIC | Age: 80
End: 2025-06-23
Payer: MEDICARE

## 2025-06-23 VITALS
WEIGHT: 206 LBS | SYSTOLIC BLOOD PRESSURE: 118 MMHG | BODY MASS INDEX: 31.22 KG/M2 | HEIGHT: 68 IN | DIASTOLIC BLOOD PRESSURE: 58 MMHG | HEART RATE: 75 BPM

## 2025-06-23 PROCEDURE — 93000 ELECTROCARDIOGRAM COMPLETE: CPT

## 2025-06-23 PROCEDURE — G2211 COMPLEX E/M VISIT ADD ON: CPT

## 2025-06-23 PROCEDURE — 99214 OFFICE O/P EST MOD 30 MIN: CPT

## 2025-07-02 ENCOUNTER — APPOINTMENT (OUTPATIENT)
Dept: ORTHOPEDIC SURGERY | Facility: HOSPITAL | Age: 80
End: 2025-07-02

## 2025-07-07 ENCOUNTER — OUTPATIENT (OUTPATIENT)
Dept: OUTPATIENT SERVICES | Facility: HOSPITAL | Age: 80
LOS: 1 days | End: 2025-07-07
Payer: MEDICARE

## 2025-07-07 DIAGNOSIS — Z98.890 OTHER SPECIFIED POSTPROCEDURAL STATES: Chronic | ICD-10-CM

## 2025-07-07 DIAGNOSIS — R97.20 ELEVATED PROSTATE SPECIFIC ANTIGEN [PSA]: ICD-10-CM

## 2025-07-07 DIAGNOSIS — Z00.8 ENCOUNTER FOR OTHER GENERAL EXAMINATION: ICD-10-CM

## 2025-07-07 PROCEDURE — A9579: CPT

## 2025-07-07 PROCEDURE — 72197 MRI PELVIS W/O & W/DYE: CPT | Mod: 26

## 2025-07-07 PROCEDURE — 72197 MRI PELVIS W/O & W/DYE: CPT

## 2025-07-08 DIAGNOSIS — R97.20 ELEVATED PROSTATE SPECIFIC ANTIGEN [PSA]: ICD-10-CM

## 2025-07-13 ENCOUNTER — INPATIENT (INPATIENT)
Facility: HOSPITAL | Age: 80
LOS: 2 days | Discharge: HOME | DRG: 176 | End: 2025-07-16
Attending: INTERNAL MEDICINE | Admitting: STUDENT IN AN ORGANIZED HEALTH CARE EDUCATION/TRAINING PROGRAM
Payer: MEDICARE

## 2025-07-13 VITALS
WEIGHT: 200.84 LBS | RESPIRATION RATE: 16 BRPM | SYSTOLIC BLOOD PRESSURE: 136 MMHG | HEIGHT: 68 IN | HEART RATE: 78 BPM | OXYGEN SATURATION: 97 % | TEMPERATURE: 99 F | DIASTOLIC BLOOD PRESSURE: 84 MMHG

## 2025-07-13 DIAGNOSIS — Z98.890 OTHER SPECIFIED POSTPROCEDURAL STATES: Chronic | ICD-10-CM

## 2025-07-13 DIAGNOSIS — I27.82 CHRONIC PULMONARY EMBOLISM: ICD-10-CM

## 2025-07-13 PROBLEM — E55.9 VITAMIN D DEFICIENCY, UNSPECIFIED: Chronic | Status: INACTIVE | Noted: 2021-11-12 | Resolved: 2025-07-13

## 2025-07-13 PROBLEM — H40.9 UNSPECIFIED GLAUCOMA: Chronic | Status: INACTIVE | Noted: 2021-11-12 | Resolved: 2025-07-13

## 2025-07-13 LAB
ALBUMIN SERPL ELPH-MCNC: 3.8 G/DL — SIGNIFICANT CHANGE UP (ref 3.5–5.2)
ALBUMIN SERPL ELPH-MCNC: 3.8 G/DL — SIGNIFICANT CHANGE UP (ref 3.5–5.2)
ALP SERPL-CCNC: 71 U/L — SIGNIFICANT CHANGE UP (ref 30–115)
ALP SERPL-CCNC: 95 U/L — SIGNIFICANT CHANGE UP (ref 30–115)
ALT FLD-CCNC: 19 U/L — SIGNIFICANT CHANGE UP (ref 0–41)
ALT FLD-CCNC: <5 U/L — SIGNIFICANT CHANGE UP (ref 0–41)
ANION GAP SERPL CALC-SCNC: 10 MMOL/L — SIGNIFICANT CHANGE UP (ref 7–14)
ANION GAP SERPL CALC-SCNC: 14 MMOL/L — SIGNIFICANT CHANGE UP (ref 7–14)
APTT BLD: 28.1 SEC — SIGNIFICANT CHANGE UP (ref 27–39.2)
APTT BLD: >200 SEC — CRITICAL HIGH (ref 27–39.2)
AST SERPL-CCNC: 126 U/L — HIGH (ref 0–41)
AST SERPL-CCNC: 32 U/L — SIGNIFICANT CHANGE UP (ref 0–41)
BASOPHILS # BLD AUTO: 0.02 K/UL — SIGNIFICANT CHANGE UP (ref 0–0.2)
BASOPHILS NFR BLD AUTO: 0.3 % — SIGNIFICANT CHANGE UP (ref 0–1)
BILIRUB SERPL-MCNC: 0.6 MG/DL — SIGNIFICANT CHANGE UP (ref 0.2–1.2)
BILIRUB SERPL-MCNC: 0.9 MG/DL — SIGNIFICANT CHANGE UP (ref 0.2–1.2)
BLD GP AB SCN SERPL QL: SIGNIFICANT CHANGE UP
BUN SERPL-MCNC: 10 MG/DL — SIGNIFICANT CHANGE UP (ref 10–20)
BUN SERPL-MCNC: 8 MG/DL — LOW (ref 10–20)
BUN SERPL-MCNC: 9 MG/DL — LOW (ref 10–20)
CALCIUM SERPL-MCNC: 8.4 MG/DL — SIGNIFICANT CHANGE UP (ref 8.4–10.5)
CALCIUM SERPL-MCNC: 9.5 MG/DL — SIGNIFICANT CHANGE UP (ref 8.4–10.5)
CALCIUM SERPL-MCNC: 9.6 MG/DL — SIGNIFICANT CHANGE UP (ref 8.4–10.5)
CHLORIDE SERPL-SCNC: 94 MMOL/L — LOW (ref 98–110)
CHLORIDE SERPL-SCNC: 96 MMOL/L — LOW (ref 98–110)
CHLORIDE SERPL-SCNC: 97 MMOL/L — LOW (ref 98–110)
CO2 SERPL-SCNC: 21 MMOL/L — SIGNIFICANT CHANGE UP (ref 17–32)
CO2 SERPL-SCNC: 23 MMOL/L — SIGNIFICANT CHANGE UP (ref 17–32)
CO2 SERPL-SCNC: 26 MMOL/L — SIGNIFICANT CHANGE UP (ref 17–32)
CREAT SERPL-MCNC: 1 MG/DL — SIGNIFICANT CHANGE UP (ref 0.7–1.5)
CREAT SERPL-MCNC: 1 MG/DL — SIGNIFICANT CHANGE UP (ref 0.7–1.5)
CREAT SERPL-MCNC: 1.1 MG/DL — SIGNIFICANT CHANGE UP (ref 0.7–1.5)
EGFR: 69 ML/MIN/1.73M2 — SIGNIFICANT CHANGE UP
EGFR: 69 ML/MIN/1.73M2 — SIGNIFICANT CHANGE UP
EGFR: 77 ML/MIN/1.73M2 — SIGNIFICANT CHANGE UP
EOSINOPHIL # BLD AUTO: 0.04 K/UL — SIGNIFICANT CHANGE UP (ref 0–0.7)
EOSINOPHIL NFR BLD AUTO: 0.5 % — SIGNIFICANT CHANGE UP (ref 0–8)
GLUCOSE BLDC GLUCOMTR-MCNC: 118 MG/DL — HIGH (ref 70–99)
GLUCOSE BLDC GLUCOMTR-MCNC: 194 MG/DL — HIGH (ref 70–99)
GLUCOSE SERPL-MCNC: 132 MG/DL — HIGH (ref 70–99)
GLUCOSE SERPL-MCNC: 173 MG/DL — HIGH (ref 70–99)
GLUCOSE SERPL-MCNC: 190 MG/DL — HIGH (ref 70–99)
HCT VFR BLD CALC: 41.1 % — LOW (ref 42–52)
HCT VFR BLD CALC: 44 % — SIGNIFICANT CHANGE UP (ref 42–52)
HGB BLD-MCNC: 13.6 G/DL — LOW (ref 14–18)
HGB BLD-MCNC: 14.5 G/DL — SIGNIFICANT CHANGE UP (ref 14–18)
IMM GRANULOCYTES NFR BLD AUTO: 0.4 % — HIGH (ref 0.1–0.3)
INR BLD: 1.04 RATIO — SIGNIFICANT CHANGE UP (ref 0.65–1.3)
LYMPHOCYTES # BLD AUTO: 1.29 K/UL — SIGNIFICANT CHANGE UP (ref 1.2–3.4)
LYMPHOCYTES # BLD AUTO: 17 % — LOW (ref 20.5–51.1)
MAGNESIUM SERPL-MCNC: 1.9 MG/DL — SIGNIFICANT CHANGE UP (ref 1.8–2.4)
MCHC RBC-ENTMCNC: 26.1 PG — LOW (ref 27–31)
MCHC RBC-ENTMCNC: 26.4 PG — LOW (ref 27–31)
MCHC RBC-ENTMCNC: 33 G/DL — SIGNIFICANT CHANGE UP (ref 32–37)
MCHC RBC-ENTMCNC: 33.1 G/DL — SIGNIFICANT CHANGE UP (ref 32–37)
MCV RBC AUTO: 78.9 FL — LOW (ref 80–94)
MCV RBC AUTO: 80.1 FL — SIGNIFICANT CHANGE UP (ref 80–94)
MONOCYTES # BLD AUTO: 0.7 K/UL — HIGH (ref 0.1–0.6)
MONOCYTES NFR BLD AUTO: 9.2 % — SIGNIFICANT CHANGE UP (ref 1.7–9.3)
NEUTROPHILS # BLD AUTO: 5.51 K/UL — SIGNIFICANT CHANGE UP (ref 1.4–6.5)
NEUTROPHILS NFR BLD AUTO: 72.6 % — SIGNIFICANT CHANGE UP (ref 42.2–75.2)
NRBC BLD AUTO-RTO: 0 /100 WBCS — SIGNIFICANT CHANGE UP (ref 0–0)
NRBC BLD AUTO-RTO: 0 /100 WBCS — SIGNIFICANT CHANGE UP (ref 0–0)
NT-PROBNP SERPL-SCNC: <36 PG/ML — SIGNIFICANT CHANGE UP (ref 0–300)
NT-PROBNP SERPL-SCNC: <36 PG/ML — SIGNIFICANT CHANGE UP (ref 0–300)
PLATELET # BLD AUTO: 185 K/UL — SIGNIFICANT CHANGE UP (ref 130–400)
PLATELET # BLD AUTO: 186 K/UL — SIGNIFICANT CHANGE UP (ref 130–400)
PMV BLD: 10.2 FL — SIGNIFICANT CHANGE UP (ref 7.4–10.4)
PMV BLD: 10.3 FL — SIGNIFICANT CHANGE UP (ref 7.4–10.4)
POTASSIUM SERPL-MCNC: 3.4 MMOL/L — LOW (ref 3.5–5)
POTASSIUM SERPL-MCNC: 3.7 MMOL/L — SIGNIFICANT CHANGE UP (ref 3.5–5)
POTASSIUM SERPL-MCNC: SIGNIFICANT CHANGE UP MMOL/L (ref 3.5–5)
POTASSIUM SERPL-SCNC: 3.4 MMOL/L — LOW (ref 3.5–5)
POTASSIUM SERPL-SCNC: 3.7 MMOL/L — SIGNIFICANT CHANGE UP (ref 3.5–5)
POTASSIUM SERPL-SCNC: SIGNIFICANT CHANGE UP MMOL/L (ref 3.5–5)
PROT SERPL-MCNC: 6.5 G/DL — SIGNIFICANT CHANGE UP (ref 6–8)
PROT SERPL-MCNC: 7.8 G/DL — SIGNIFICANT CHANGE UP (ref 6–8)
PROTHROM AB SERPL-ACNC: 12.3 SEC — SIGNIFICANT CHANGE UP (ref 9.95–12.87)
RBC # BLD: 5.21 M/UL — SIGNIFICANT CHANGE UP (ref 4.7–6.1)
RBC # BLD: 5.49 M/UL — SIGNIFICANT CHANGE UP (ref 4.7–6.1)
RBC # FLD: 13 % — SIGNIFICANT CHANGE UP (ref 11.5–14.5)
RBC # FLD: 13.2 % — SIGNIFICANT CHANGE UP (ref 11.5–14.5)
SODIUM SERPL-SCNC: 127 MMOL/L — LOW (ref 135–146)
SODIUM SERPL-SCNC: 134 MMOL/L — LOW (ref 135–146)
SODIUM SERPL-SCNC: 136 MMOL/L — SIGNIFICANT CHANGE UP (ref 135–146)
TROPONIN T, HIGH SENSITIVITY RESULT: 11 NG/L — SIGNIFICANT CHANGE UP (ref 6–21)
WBC # BLD: 7.59 K/UL — SIGNIFICANT CHANGE UP (ref 4.8–10.8)
WBC # BLD: 7.92 K/UL — SIGNIFICANT CHANGE UP (ref 4.8–10.8)
WBC # FLD AUTO: 7.59 K/UL — SIGNIFICANT CHANGE UP (ref 4.8–10.8)
WBC # FLD AUTO: 7.92 K/UL — SIGNIFICANT CHANGE UP (ref 4.8–10.8)

## 2025-07-13 PROCEDURE — 83880 ASSAY OF NATRIURETIC PEPTIDE: CPT

## 2025-07-13 PROCEDURE — 84145 PROCALCITONIN (PCT): CPT

## 2025-07-13 PROCEDURE — 71045 X-RAY EXAM CHEST 1 VIEW: CPT | Mod: 26

## 2025-07-13 PROCEDURE — 93306 TTE W/DOPPLER COMPLETE: CPT

## 2025-07-13 PROCEDURE — 83036 HEMOGLOBIN GLYCOSYLATED A1C: CPT

## 2025-07-13 PROCEDURE — 93970 EXTREMITY STUDY: CPT

## 2025-07-13 PROCEDURE — 99223 1ST HOSP IP/OBS HIGH 75: CPT

## 2025-07-13 PROCEDURE — 86850 RBC ANTIBODY SCREEN: CPT

## 2025-07-13 PROCEDURE — 80053 COMPREHEN METABOLIC PANEL: CPT

## 2025-07-13 PROCEDURE — 84484 ASSAY OF TROPONIN QUANT: CPT

## 2025-07-13 PROCEDURE — 80048 BASIC METABOLIC PNL TOTAL CA: CPT

## 2025-07-13 PROCEDURE — 85730 THROMBOPLASTIN TIME PARTIAL: CPT

## 2025-07-13 PROCEDURE — 93005 ELECTROCARDIOGRAM TRACING: CPT

## 2025-07-13 PROCEDURE — 36415 COLL VENOUS BLD VENIPUNCTURE: CPT

## 2025-07-13 PROCEDURE — 86900 BLOOD TYPING SEROLOGIC ABO: CPT

## 2025-07-13 PROCEDURE — 93306 TTE W/DOPPLER COMPLETE: CPT | Mod: 26

## 2025-07-13 PROCEDURE — 85027 COMPLETE CBC AUTOMATED: CPT

## 2025-07-13 PROCEDURE — 76705 ECHO EXAM OF ABDOMEN: CPT

## 2025-07-13 PROCEDURE — 86901 BLOOD TYPING SEROLOGIC RH(D): CPT

## 2025-07-13 PROCEDURE — 82962 GLUCOSE BLOOD TEST: CPT

## 2025-07-13 PROCEDURE — 99291 CRITICAL CARE FIRST HOUR: CPT

## 2025-07-13 PROCEDURE — 93970 EXTREMITY STUDY: CPT | Mod: 26

## 2025-07-13 PROCEDURE — 71275 CT ANGIOGRAPHY CHEST: CPT | Mod: 26

## 2025-07-13 PROCEDURE — 83735 ASSAY OF MAGNESIUM: CPT

## 2025-07-13 RX ORDER — DEXTROSE 50 % IN WATER 50 %
25 SYRINGE (ML) INTRAVENOUS ONCE
Refills: 0 | Status: DISCONTINUED | OUTPATIENT
Start: 2025-07-13 | End: 2025-07-16

## 2025-07-13 RX ORDER — ONDANSETRON HCL/PF 4 MG/2 ML
4 VIAL (ML) INJECTION EVERY 8 HOURS
Refills: 0 | Status: DISCONTINUED | OUTPATIENT
Start: 2025-07-13 | End: 2025-07-16

## 2025-07-13 RX ORDER — ACETAMINOPHEN 500 MG/5ML
650 LIQUID (ML) ORAL EVERY 6 HOURS
Refills: 0 | Status: DISCONTINUED | OUTPATIENT
Start: 2025-07-13 | End: 2025-07-16

## 2025-07-13 RX ORDER — LISINOPRIL 5 MG/1
1 TABLET ORAL
Refills: 0 | DISCHARGE

## 2025-07-13 RX ORDER — METFORMIN HYDROCHLORIDE 850 MG/1
1 TABLET ORAL
Refills: 0 | DISCHARGE

## 2025-07-13 RX ORDER — ATORVASTATIN CALCIUM 80 MG/1
20 TABLET, FILM COATED ORAL AT BEDTIME
Refills: 0 | Status: DISCONTINUED | OUTPATIENT
Start: 2025-07-13 | End: 2025-07-16

## 2025-07-13 RX ORDER — HEPARIN SODIUM 1000 [USP'U]/ML
INJECTION INTRAVENOUS; SUBCUTANEOUS
Qty: 25000 | Refills: 0 | Status: DISCONTINUED | OUTPATIENT
Start: 2025-07-13 | End: 2025-07-13

## 2025-07-13 RX ORDER — TAMSULOSIN HYDROCHLORIDE 0.4 MG/1
0.4 CAPSULE ORAL AT BEDTIME
Refills: 0 | Status: DISCONTINUED | OUTPATIENT
Start: 2025-07-13 | End: 2025-07-16

## 2025-07-13 RX ORDER — INSULIN LISPRO 100 U/ML
INJECTION, SOLUTION INTRAVENOUS; SUBCUTANEOUS
Refills: 0 | Status: DISCONTINUED | OUTPATIENT
Start: 2025-07-13 | End: 2025-07-16

## 2025-07-13 RX ORDER — SODIUM CHLORIDE 9 G/1000ML
1000 INJECTION, SOLUTION INTRAVENOUS
Refills: 0 | Status: DISCONTINUED | OUTPATIENT
Start: 2025-07-13 | End: 2025-07-16

## 2025-07-13 RX ORDER — MAGNESIUM, ALUMINUM HYDROXIDE 200-200 MG
30 TABLET,CHEWABLE ORAL EVERY 4 HOURS
Refills: 0 | Status: DISCONTINUED | OUTPATIENT
Start: 2025-07-13 | End: 2025-07-16

## 2025-07-13 RX ORDER — INSULIN GLARGINE-YFGN 100 [IU]/ML
18 INJECTION, SOLUTION SUBCUTANEOUS AT BEDTIME
Refills: 0 | Status: DISCONTINUED | OUTPATIENT
Start: 2025-07-13 | End: 2025-07-14

## 2025-07-13 RX ORDER — DEXTROSE 50 % IN WATER 50 %
12.5 SYRINGE (ML) INTRAVENOUS ONCE
Refills: 0 | Status: DISCONTINUED | OUTPATIENT
Start: 2025-07-13 | End: 2025-07-16

## 2025-07-13 RX ORDER — HEPARIN SODIUM 1000 [USP'U]/ML
7500 INJECTION INTRAVENOUS; SUBCUTANEOUS EVERY 6 HOURS
Refills: 0 | Status: DISCONTINUED | OUTPATIENT
Start: 2025-07-13 | End: 2025-07-14

## 2025-07-13 RX ORDER — MELATONIN 5 MG
3 TABLET ORAL AT BEDTIME
Refills: 0 | Status: DISCONTINUED | OUTPATIENT
Start: 2025-07-13 | End: 2025-07-16

## 2025-07-13 RX ORDER — DICLOFENAC SODIUM 10 MG/G
2.25 GEL TOPICAL
Refills: 0 | DISCHARGE

## 2025-07-13 RX ORDER — HEPARIN SODIUM 1000 [USP'U]/ML
3500 INJECTION INTRAVENOUS; SUBCUTANEOUS EVERY 6 HOURS
Refills: 0 | Status: DISCONTINUED | OUTPATIENT
Start: 2025-07-13 | End: 2025-07-14

## 2025-07-13 RX ORDER — TIMOLOL MALEATE 6.8 MG/ML
1 SOLUTION OPHTHALMIC
Refills: 0 | DISCHARGE

## 2025-07-13 RX ORDER — LATANOPROST PF 0.05 MG/ML
1 SOLUTION/ DROPS OPHTHALMIC
Refills: 0 | DISCHARGE

## 2025-07-13 RX ORDER — INSULIN LISPRO 100 U/ML
6 INJECTION, SOLUTION INTRAVENOUS; SUBCUTANEOUS
Refills: 0 | Status: DISCONTINUED | OUTPATIENT
Start: 2025-07-13 | End: 2025-07-14

## 2025-07-13 RX ORDER — HEPARIN SODIUM 1000 [USP'U]/ML
7500 INJECTION INTRAVENOUS; SUBCUTANEOUS ONCE
Refills: 0 | Status: COMPLETED | OUTPATIENT
Start: 2025-07-13 | End: 2025-07-13

## 2025-07-13 RX ORDER — ATORVASTATIN CALCIUM 80 MG/1
1 TABLET, FILM COATED ORAL
Refills: 0 | DISCHARGE

## 2025-07-13 RX ORDER — DEXTROSE 50 % IN WATER 50 %
15 SYRINGE (ML) INTRAVENOUS ONCE
Refills: 0 | Status: DISCONTINUED | OUTPATIENT
Start: 2025-07-13 | End: 2025-07-16

## 2025-07-13 RX ORDER — GLUCAGON 3 MG/1
1 POWDER NASAL ONCE
Refills: 0 | Status: DISCONTINUED | OUTPATIENT
Start: 2025-07-13 | End: 2025-07-16

## 2025-07-13 RX ORDER — HEPARIN SODIUM 1000 [USP'U]/ML
1400 INJECTION INTRAVENOUS; SUBCUTANEOUS
Qty: 25000 | Refills: 0 | Status: DISCONTINUED | OUTPATIENT
Start: 2025-07-13 | End: 2025-07-14

## 2025-07-13 RX ORDER — LISINOPRIL 5 MG/1
20 TABLET ORAL DAILY
Refills: 0 | Status: DISCONTINUED | OUTPATIENT
Start: 2025-07-13 | End: 2025-07-16

## 2025-07-13 RX ORDER — FLUTICASONE PROPIONATE 220 UG/1
1 AEROSOL, METERED RESPIRATORY (INHALATION)
Refills: 0 | DISCHARGE

## 2025-07-13 RX ADMIN — ATORVASTATIN CALCIUM 20 MILLIGRAM(S): 80 TABLET, FILM COATED ORAL at 21:04

## 2025-07-13 RX ADMIN — INSULIN LISPRO 6 UNIT(S): 100 INJECTION, SOLUTION INTRAVENOUS; SUBCUTANEOUS at 17:26

## 2025-07-13 RX ADMIN — TAMSULOSIN HYDROCHLORIDE 0.4 MILLIGRAM(S): 0.4 CAPSULE ORAL at 21:04

## 2025-07-13 RX ADMIN — INSULIN LISPRO 2: 100 INJECTION, SOLUTION INTRAVENOUS; SUBCUTANEOUS at 21:02

## 2025-07-13 RX ADMIN — HEPARIN SODIUM 1400 UNIT(S)/HR: 1000 INJECTION INTRAVENOUS; SUBCUTANEOUS at 21:21

## 2025-07-13 RX ADMIN — Medication 40 MILLIEQUIVALENT(S): at 18:42

## 2025-07-13 RX ADMIN — INSULIN GLARGINE-YFGN 18 UNIT(S): 100 INJECTION, SOLUTION SUBCUTANEOUS at 21:01

## 2025-07-13 RX ADMIN — HEPARIN SODIUM 1700 UNIT(S)/HR: 1000 INJECTION INTRAVENOUS; SUBCUTANEOUS at 12:32

## 2025-07-13 RX ADMIN — HEPARIN SODIUM 7500 UNIT(S): 1000 INJECTION INTRAVENOUS; SUBCUTANEOUS at 21:21

## 2025-07-13 RX ADMIN — HEPARIN SODIUM 7500 UNIT(S): 1000 INJECTION INTRAVENOUS; SUBCUTANEOUS at 12:31

## 2025-07-13 RX ADMIN — HEPARIN SODIUM 0 UNIT(S)/HR: 1000 INJECTION INTRAVENOUS; SUBCUTANEOUS at 20:36

## 2025-07-13 NOTE — PATIENT PROFILE ADULT - FALL HARM RISK - PATIENT NEEDS ASSISTANCE
This writer called and left a voicemail conveying the following. You tested positive for Covid. Please continue with symptomatic treatment as discussed during urgent care visit. Per updated CDC guidelines patient should quarantine for 5 days following date of symptom onset (through 1/6/22) then may exit quarantine and should continue to mask for additional 5 days. Present to urgent care or ED setting if experiencing any SOB or chest pain.   Standing/Walking/Toileting

## 2025-07-13 NOTE — H&P ADULT - NSHPPHYSICALEXAM_GEN_ALL_CORE
General: WDWN, NAD  Neuro: A&Ox4, normal gait   Neck: supple, non-tender  Cardio: +S1S, RRR, no M/R/G  Pulm CTA B/L No W/R/R  GI: soft, distended, non-tender   Extremities: No C/C/E.   Psych: appropriate mood and affect

## 2025-07-13 NOTE — H&P ADULT - HISTORY OF PRESENT ILLNESS
80-year-old male past medical history hypertension, hyperlipidemia, diabetes, PE not on AC, CAD with CAD RADS 2 on CCTA in May presents for evaluation of chest pain.  Patient states he was recently started on metformin for his diabetes and whenever he takes the medication he gets right-sided chest pain.  No relieving factors.  Denies fever, headache, cough, shortness of breath, abdominal pain, dysuria, hematuria, vomiting, diarrhea.    Vitals: /84, HR 78, RR 16, T 98.7, RR 16, 97% on RA   CBC WNL, BMP Na 127, K hemoylzed- pending repeat, BNP 36  CXR: largely unchanged from prior- ? LA?    CT chest angio: Acute bilateral PEs in the right main pulmonary artery, segmental and   subsegmental branches of the lower hemithorax, and left segmental and   subsegmental branches of the lower hemithorax.  Questionable right heart strain with RV: LV ratio of approximately 1.   Consider echo for further evaluation.  Wedge-shaped areas of consolidation in the inferior right middle lobe and   in the basilar left upper lobe, likely infarcted lung.    Heparin gtt started. PERT team activated per ED staff       80-year-old male past medical history hypertension, hyperlipidemia, diabetes, PE not on AC (never took any meds), CAD with CAD RADS 2 on CCTA (L Main artery mild stenosis, Prox LAD mild stenosis)  in May presents for evaluation of chest pain. Pt states that he has been having 4/10 chest pain on exertion for the last 8 months. Diagnosed with a PE 4 months ago, saw heme but "I never got any meds", No one has given him any explanation as to why the PE happened. Pt attributes it to his high BP.  Patient states he was recently started on metformin for his diabetes and whenever he takes the medication he gets right-sided chest pain- no longer wants to take it.  No relieving factors.  Denies fever, headache, cough, shortness of breath, abdominal pain, dysuria, hematuria, vomiting, diarrhea, SOB at rest..    Vitals: /84, HR 78, RR 16, T 98.7, RR 16, 97% on RA   CBC WNL, BMP Na 127, K hemoylzed- pending repeat, BNP 36, Trop 11, coags WNL   CXR: largely unchanged from prior- ? LA?    CT chest angio: Acute bilateral PEs in the right main pulmonary artery, segmental and   subsegmental branches of the lower hemithorax, and left segmental and   subsegmental branches of the lower hemithorax.  Questionable right heart strain with RV: LV ratio of approximately 1.   Consider echo for further evaluation.  Wedge-shaped areas of consolidation in the inferior right middle lobe and   in the basilar left upper lobe, likely infarcted lung.    Heparin gtt started. PERT team activated per ED staff     80-year-old male past medical history hypertension, hyperlipidemia, diabetes, PE not on AC (never took any meds), CAD with CAD RADS 2 on CCTA (L Main artery mild stenosis, Prox LAD mild stenosis)  in May presents for evaluation of chest pain. Pt states that he has been having 4/10 chest pain on exertion for the last 8 months. Diagnosed with a PE 4 months ago, saw heme but "I never got any meds", No one has given him any explanation as to why the PE happened. Pt attributes it to his high BP.  Patient states he was recently started on metformin for his diabetes and whenever he takes the medication he gets right-sided chest pain- no longer wants to take it.  No relieving factors.  Denies fever, headache, cough, shortness of breath, abdominal pain, dysuria, hematuria, vomiting, diarrhea, SOB at rest..    Vitals: /84, HR 78, RR 16, T 98.7, RR 16, 97% on RA   CBC WNL, BMP Na 127, K hemoylzed- pending repeat, BNP 36, Trop 11, coags WNL   EKG: TWI in III, ? ST depression in AVF  CXR: largely unchanged from prior- ? LA?    CT chest angio: Acute bilateral PEs in the right main pulmonary artery, segmental and   subsegmental branches of the lower hemithorax, and left segmental and   subsegmental branches of the lower hemithorax.  Questionable right heart strain with RV: LV ratio of approximately 1.   Consider echo for further evaluation.  Wedge-shaped areas of consolidation in the inferior right middle lobe and   in the basilar left upper lobe, likely infarcted lung.    Heparin gtt started. PERT team activated per ED staff     80-year-old male past medical history hypertension, hyperlipidemia, diabetes, PE not on AC (never took any meds), CAD with CAD RADS 2 on CCTA ( Dr. Brito, notes L Main artery mild stenosis, Prox LAD mild stenosis)  in May presents for evaluation of chest pain. Pt states that he has been having 4/10 chest pain on exertion for the last 8 months. Diagnosed with a PE 4 months ago, saw heme but "I never got any meds", No one has given him any explanation as to why the PE happened. Pt attributes it to his high BP.  Patient states he was recently started on metformin for his diabetes and whenever he takes the medication he gets right-sided chest pain- no longer wants to take it.  No relieving factors.  Denies fever, headache, cough, shortness of breath, abdominal pain, dysuria, hematuria, vomiting, diarrhea, SOB at rest..    Vitals: /84, HR 78, RR 16, T 98.7, RR 16, 97% on RA   CBC WNL, BMP Na 127, K hemoylzed- pending repeat, BNP 36, Trop 11, coags WNL   EKG: TWI in III, ? ST depression in AVF  CXR: largely unchanged from prior- ? LA?    CT chest angio: Acute bilateral PEs in the right main pulmonary artery, segmental and   subsegmental branches of the lower hemithorax, and left segmental and   subsegmental branches of the lower hemithorax.  Questionable right heart strain with RV: LV ratio of approximately 1.   Consider echo for further evaluation.  Wedge-shaped areas of consolidation in the inferior right middle lobe and   in the basilar left upper lobe, likely infarcted lung.    Heparin gtt started. PERT team activated per ED staff

## 2025-07-13 NOTE — PATIENT PROFILE ADULT - OVER THE PAST TWO WEEKS HAVE YOU FELT DOWN, DEPRESSED OR HOPELESS?
Body Location Override (Optional - Billing Will Still Be Based On Selected Body Map Location If Applicable): left proximal lower leg
Detail Level: Detailed
Add 78924 Cpt? (Important Note: In 2017 The Use Of 99619 Is Being Tracked By Cms To Determine Future Global Period Reimbursement For Global Periods): no
Wound Evaluated By: HR
no

## 2025-07-13 NOTE — H&P ADULT - NSICDXPASTMEDICALHX_GEN_ALL_CORE_FT
PAST MEDICAL HISTORY:  CAD (coronary artery disease)     DM (diabetes mellitus)     HLD (hyperlipidemia)     HTN (hypertension)     Pulmonary embolism

## 2025-07-13 NOTE — ED ADULT TRIAGE NOTE - BP NONINVASIVE SYSTOLIC (MM HG)
136 Birth Control Pills Pregnancy And Lactation Text: This medication should be avoided if pregnant and for the first 30 days post-partum.

## 2025-07-13 NOTE — ED ADULT TRIAGE NOTE - CHIEF COMPLAINT QUOTE
pt c/o chest pain x2-3 days, pt believes he has an "allergy to metformin because he didn't feel this way until he took the metformin"

## 2025-07-13 NOTE — ED PROVIDER NOTE - OBJECTIVE STATEMENT
80-year-old male past medical history hypertension, hyperlipidemia, diabetes, CAD with CAD RADS 2 on CCTA in May presents for evaluation of chest pain.  Patient states he was recently started on metformin for his diabetes and whenever he takes the medication he gets right-sided chest pain.  No relieving factors.  Denies fever, headache, cough, shortness of breath, abdominal pain, dysuria, hematuria, vomiting, diarrhea. 80-year-old male past medical history hypertension, hyperlipidemia, diabetes, PE not on AC, CAD with CAD RADS 2 on CCTA in May presents for evaluation of chest pain.  Patient states he was recently started on metformin for his diabetes and whenever he takes the medication he gets right-sided chest pain.  No relieving factors.  Denies fever, headache, cough, shortness of breath, abdominal pain, dysuria, hematuria, vomiting, diarrhea.

## 2025-07-13 NOTE — H&P ADULT - ATTENDING COMMENTS
I have personally seen and examined the patient.  I fully participated in the care of this patient. I DISCUSSED WITH THE RESIDENT AND I EDITED THE ABOVE NOTE except as below.     Pt seen in ED main 8   On exam  General: awake, alert, NAD , comfortable   Lungs:  clear to ausculation b/l, normal resp effort on room air   Heart: regular rhythm not tachy  Abdomen: soft, non tender non distended + BS  Ext: no edema, can move all  his extremities     Discussed with the patient in details the risk/ benefits and alternative  of AG and he agreed to receive it , he stated that he got it before for prior PE and no bleeding reported back then. heparin ggt already started in the ED   As per ED verbal - PERT was activated , IR procedure tomorrow  Admitted to SDU - F/U With pulm cc   - hemodynamic stable , intermediate high risk   tele, echo, LE duplex   ekg with ITW III,  ? depressed ST in AVF, V3, V4   Heparin ggt, monitor aPTT every 6 hours and adjust the heparin as per protocol, monitor CBC, monitor for bleeding and hemodynamics, if new symptoms develops , need full eval.     I discussed the GOC with the patient as mentioned above   I also discussed with his family in [place] Via his Phone

## 2025-07-13 NOTE — ED PROVIDER NOTE - CLINICAL SUMMARY MEDICAL DECISION MAKING FREE TEXT BOX
80-year-old male PMH HTN, HLD, DM, history of PE no longer on AC and recent CCTA CAD RADS 2 here for right-sided chest pain, pleuritic in nature.  Nonexertional, no associated diaphoresis.  Believes it is due to his metformin intake for the past few days.  CT angio notable for multiple pulmonary emboli in bilateral lobes with initial read showing right-sided heart strain.  Patient well-appearing, clinically unremarkable, vital signs been stable throughout.  ECG with new inferior lead ST depressions. PERT team activated who reported patient to be likely with thrombectomy tomorrow and to start heparin.  Patient admitted to stepdown unit after being run by the ICU.

## 2025-07-13 NOTE — ED ADULT TRIAGE NOTE - PATIENT'S SEXUAL ORIENTATION
Heterosexual Bexarotene Pregnancy And Lactation Text: This medication is Pregnancy Category X and should not be given to women who are pregnant or may become pregnant. This medication should not be used if you are breast feeding.

## 2025-07-13 NOTE — H&P ADULT - ASSESSMENT
80-year-old male past medical history hypertension, hyperlipidemia, diabetes, PE not on AC, CAD with CAD RADS 2 on CCTA in May presents for evaluation of chest pain.    #Extensive PE's   CT chest angio: Acute bilateral PEs in the right main pulmonary artery, segmental and   subsegmental branches of the lower hemithorax, and left segmental and   subsegmental branches of the lower hemithorax.  Questionable right heart strain with RV: LV ratio of approximately 1.   Consider echo for further evaluation.  Wedge-shaped areas of consolidation in the inferior right middle lobe and   in the basilar left upper lobe, likely infarcted lung.  Pending thrombectomy tomorrow. CALL IR IMMEDIATELY IF BECOMES HD UNSTABLE    -c/w heparin gtt  -NPO after MN     80-year-old male past medical history hypertension, hyperlipidemia, diabetes, PE not on AC, CAD with CAD RADS 2 on CCTA in May presents for evaluation of chest pain.    #Extensive PE's   CT chest angio: Acute bilateral PEs in the right main pulmonary artery, segmental and   subsegmental branches of the lower hemithorax, and left segmental and   subsegmental branches of the lower hemithorax.  Questionable right heart strain with RV: LV ratio of approximately 1.   Consider echo for further evaluation.  Wedge-shaped areas of consolidation in the inferior right middle lobe and   in the basilar left upper lobe, likely infarcted lung.  Pending thrombectomy tomorrow 7/14. CALL IR IMMEDIATELY IF BECOMES HD UNSTABLE    -c/w heparin gtt  -NPO after MN,preop labs   -heme-onc csx for ? PE w/ unclear provocation/eitology  -f/u echo for right heart strain  -f/u duplex        #CAD  Checked HIE. Cardio note from Dr. Brito: Had nuclear MPI in April 25 - negative, CCTA in May 25 - mild disease. Referred for cardiology evaluation after the last visit.Was seen by Dr Horton in 2019 Pt has occasional chest discomfort with and without exertion, SOB with exertion. Reports BP at home improved from 160's to 130th TTE 5/16/24 EF 55%-60% Grade IDD 4/25/25 Lexiscan no ischemia EF 55% 5/10/25 CCTA Calcium Score 62 CAD-RADs 2 L Main artery mild stenosis, Prox LAD mild stenosis Cancelled R Hip surgery due to uncontrolled DM 6/10/25 HgbA1C 7.1 Chol 145 LDL 66 Trig 119 K 3.2 BUN 13/ cr 1.     80-year-old male past medical history hypertension, hyperlipidemia, diabetes, PE not on AC, CAD with CAD RADS 2 on CCTA in May presents for evaluation of chest pain.    #Extensive PE's   CT chest angio: Acute bilateral PEs in the right main pulmonary artery, segmental and   subsegmental branches of the lower hemithorax, and left segmental and   subsegmental branches of the lower hemithorax.  Questionable right heart strain with RV: LV ratio of approximately 1.   Consider echo for further evaluation.  Wedge-shaped areas of consolidation in the inferior right middle lobe and   in the basilar left upper lobe, likely infarcted lung.  BNP 36, trop 11  EKG: TWI in III, ? ST depression in AVF  Pending thrombectomy tomorrow 7/14. CALL IR IMMEDIATELY IF BECOMES HD UNSTABLE    -c/w heparin gtt  -NPO after MN,preop labs   -heme-onc csx for ? PE w/ unclear provocation/eitology  -f/u echo for right heart strain  -f/u duplex    #CAD  Checked HIE. Cardio note from Dr. Brito: Had nuclear MPI in April 25 - negative, CCTA in May 25 - mild disease. Referred for cardiology evaluation after the last visit.Was seen by Dr Horton in 2019 Pt has occasional chest discomfort with and without exertion, SOB with exertion. Reports BP at home improved from 160's to 130th TTE 5/16/24 EF 55%-60% Grade IDD 4/25/25 Lexiscan no ischemia EF 55% 5/10/25 CCTA Calcium Score 62 CAD-RADs 2 L Main artery mild stenosis, Prox LAD mild stenosis Cancelled R Hip surgery due to uncontrolled DM 6/10/25 HgbA1C 7.1 Chol 145 LDL 66 Trig 119 K 3.2 BUN 13/ cr 1.    #HTN  -c/w lisinopril 20mg daily, chlorthalidone 25mg daily (home meds)    #DM   On metformin at home. Will do 0.4u/kg insulin here + ISS. Adjust as necessary    #HLD  -c/w atorva 20mg daily       DVT ppx: heparin gtt for now   GI ppx: protonix  Diet: DASH, CC - NPO after MN  dispo: SDU      80-year-old male past medical history hypertension, hyperlipidemia, diabetes, PE not on AC, CAD with CAD RADS 2 on CCTA in May presents for evaluation of chest pain.    #Extensive PE's   CT chest angio: Acute bilateral PEs in the right main pulmonary artery, segmental and   subsegmental branches of the lower hemithorax, and left segmental and   subsegmental branches of the lower hemithorax.  Questionable right heart strain with RV: LV ratio of approximately 1.   Consider echo for further evaluation.  Wedge-shaped areas of consolidation in the inferior right middle lobe and   in the basilar left upper lobe, likely infarcted lung.  BNP 36, trop 11  EKG: TWI in III, ? ST depression in AVF  Pending thrombectomy tomorrow 7/14. CALL IR and MICU IMMEDIATELY IF BECOMES HD UNSTABLE    -c/w heparin gtt  -NPO after MN,preop labs   -heme-onc csx for ? PE w/ unclear provocation/eitology  -f/u echo for right heart strain  -f/u duplex    #CAD  Checked HIE. Cardio note from Dr. Brito: Had nuclear MPI in April 25 - negative, CCTA in May 25 - mild disease. Referred for cardiology evaluation after the last visit.Was seen by Dr Horton in 2019 Pt has occasional chest discomfort with and without exertion, SOB with exertion. Reports BP at home improved from 160's to 130th TTE 5/16/24 EF 55%-60% Grade IDD 4/25/25 Lexiscan no ischemia EF 55% 5/10/25 CCTA Calcium Score 62 CAD-RADs 2 L Main artery mild stenosis, Prox LAD mild stenosis Cancelled R Hip surgery due to uncontrolled DM 6/10/25 HgbA1C 7.1 Chol 145 LDL 66 Trig 119 K 3.2 BUN 13/ cr 1.    #HTN  -c/w lisinopril 20mg daily, chlorthalidone 25mg daily (home meds)    #DM   On metformin at home. Will do 0.4u/kg insulin here + ISS. Adjust as necessary    #HLD  -c/w atorva 20mg daily       DVT ppx: heparin gtt for now   GI ppx: protonix  Diet: DASH, CC - NPO after MN  dispo: SDU      80-year-old male past medical history hypertension, hyperlipidemia, diabetes, PE not on AC, CAD with CAD RADS 2 on CCTA in May presents for evaluation of chest pain.    [] Extensive PE's  - bilateral with possible R heart strain   []Lung infarction   CT chest angio: Acute bilateral PEs in the right main pulmonary artery, segmental and   subsegmental branches of the lower hemithorax, and left segmental and   subsegmental branches of the lower hemithorax.  Questionable right heart strain with RV: LV ratio of approximately 1.   Consider echo for further evaluation.  Wedge-shaped areas of consolidation in the inferior right middle lobe and   in the basilar left upper lobe, likely infarcted lung.  BNP 36, trop 11  EKG: TWI in III, ? ST depression in AVF  Pending thrombectomy tomorrow 7/14. CALL IR and MICU IMMEDIATELY IF BECOMES HD UNSTABLE    -c/w heparin gtt  Heprain ggt, monitor aPTT every 6 hours and adjust the heparin as per protocl, monitor CBC, monitor for bleeding and hemodynamics, if new symptoms develops , need full eval.   -NPO after MN,preop labs   -heme-onc csx for ? PE w/ unclear provocation/eitology  -f/u echo for right heart strain  -f/u duplex  f/u pulm cc - admitted to SDU   IR Consult     #CAD  Checked HIE. Cardio note from Dr. Brito: Had nuclear MPI in April 25 - negative, CCTA in May 25 - mild disease. Referred for cardiology evaluation after the last visit.Was seen by Dr Horton in 2019 Pt has occasional chest discomfort with and without exertion, SOB with exertion. Reports BP at home improved from 160's to 130th TTE 5/16/24 EF 55%-60% Grade IDD 4/25/25 Lexiscan no ischemia EF 55% 5/10/25 CCTA Calcium Score 62 CAD-RADs 2 L Main artery mild stenosis, Prox LAD mild stenosis Cancelled R Hip surgery due to uncontrolled DM 6/10/25 HgbA1C 7.1 Chol 145 LDL 66 Trig 119 K 3.2 BUN 13/ cr 1.    #HTN  -c/w lisinopril 20mg daily with parameter , hold chlorthalidone 25mg daily (home meds)  monitor blood pressure closely     #DM   On metformin at home. Will do 0.4u/kg insulin here + ISS. Adjust as necessary    #HLD  -c/w atorva 20mg daily       DVT ppx: heparin gtt for now   GI ppx: protonix  Diet: DASH, CC - NPO after MN  dispo: SDU      80-year-old male past medical history hypertension, hyperlipidemia, diabetes, PE not on AC, CAD with CAD RADS 2 on CCTA in May presents for evaluation of chest pain.    [] Extensive PE's  - bilateral with possible R heart strain   []Lung infarction   CT chest angio: Acute bilateral PEs in the right main pulmonary artery, segmental and   subsegmental branches of the lower hemithorax, and left segmental and   subsegmental branches of the lower hemithorax.  Questionable right heart strain with RV: LV ratio of approximately 1.   Consider echo for further evaluation.  Wedge-shaped areas of consolidation in the inferior right middle lobe and   in the basilar left upper lobe, likely infarcted lung.  BNP 36, trop 11  EKG: TWI in III, ? ST depression in AVF  Pending thrombectomy tomorrow 7/14. CALL IR and MICU IMMEDIATELY IF BECOMES HD UNSTABLE    -c/w heparin gtt  Heprain ggt, monitor aPTT every 6 hours and adjust the heparin as per protocl, monitor CBC, monitor for bleeding and hemodynamics, if new symptoms develops , need full eval.   -NPO after MN,preop labs   -heme-onc csx for ? PE w/ unclear provocation/eitology  -f/u echo for right heart strain  -f/u duplex  f/u pulm cc - admitted to SDU   IR Consult   CMP hemolyzed repeat lab     #CAD  Checked HIE. Cardio note from Dr. Brito: Had nuclear MPI in April 25 - negative, CCTA in May 25 - mild disease. Referred for cardiology evaluation after the last visit.Was seen by Dr Horton in 2019 Pt has occasional chest discomfort with and without exertion, SOB with exertion. Reports BP at home improved from 160's to 130th TTE 5/16/24 EF 55%-60% Grade IDD 4/25/25 Lexiscan no ischemia EF 55% 5/10/25 CCTA Calcium Score 62 CAD-RADs 2 L Main artery mild stenosis, Prox LAD mild stenosis Cancelled R Hip surgery due to uncontrolled DM 6/10/25 HgbA1C 7.1 Chol 145 LDL 66 Trig 119 K 3.2 BUN 13/ cr 1.    #HTN  -c/w lisinopril 20mg daily with parameter , hold chlorthalidone 25mg daily (home meds)  monitor blood pressure closely     #DM   On metformin at home. Will do 0.4u/kg insulin here + ISS. Adjust as necessary    #HLD  -c/w atorva 20mg daily       DVT ppx: heparin gtt for now   GI ppx: protonix  Diet: DASH, CC - NPO after MN  dispo: SDU

## 2025-07-13 NOTE — H&P ADULT - CONVERSATION DETAILS
I Discussed in details with the patient , I explained the meaning of resuscitations and intubation if needed in details and the patient stated understanding and he wants to remain Full code

## 2025-07-13 NOTE — PATIENT PROFILE ADULT - FALL HARM RISK - RISK INTERVENTIONS
Assistance OOB with selected safe patient handling equipment/Assistance with ambulation/Communicate Fall Risk and Risk Factors to all staff, patient, and family/Discuss with provider need for PT consult/Monitor gait and stability/Provide patient with walking aids - walker, cane, crutches/Reinforce activity limits and safety measures with patient and family/Review medications for side effects contributing to fall risk/Sit up slowly, dangle for a short time, stand at bedside before walking/Toileting schedule using arm’s reach rule for commode and bathroom/Visual Cue: Yellow wristband/Bed in lowest position, wheels locked, appropriate side rails in place/Call bell, personal items and telephone in reach/Instruct patient to call for assistance before getting out of bed or chair/Non-slip footwear when patient is out of bed/[place] to call system/Physically safe environment - no spills, clutter or unnecessary equipment/Purposeful Proactive Rounding/Room/bathroom lighting operational, light cord in reach

## 2025-07-13 NOTE — H&P ADULT - TIME BILLING
Birth Control Pills Pregnancy And Lactation Text: This medication should be avoided if pregnant and for the first 30 days post-partum. time spent on review of labs, imaging studies, old records, obtaining history, personally examining patient, counselling and communicating with patient and his family , entering orders for medications/tests/etc, discussions with other health care providers, GOC documentation in electronic health records, independent interpretation of labs, imaging/procedure results and care coordination. The time exclude teaching time /or separately reported services.

## 2025-07-13 NOTE — ED PROVIDER NOTE - ATTENDING CONTRIBUTION TO CARE
I personally saw the patient. CORY moon and I provided critical care for a total of 35 minutes. I provided a substantive portion of the care and the majority of the critical care time.

## 2025-07-14 LAB
APTT BLD: >200 SEC — CRITICAL HIGH (ref 27–39.2)
BUN SERPL-MCNC: 11 MG/DL — SIGNIFICANT CHANGE UP (ref 10–20)
CALCIUM SERPL-MCNC: 8.7 MG/DL — SIGNIFICANT CHANGE UP (ref 8.4–10.5)
CHLORIDE SERPL-SCNC: 99 MMOL/L — SIGNIFICANT CHANGE UP (ref 98–110)
CO2 SERPL-SCNC: 21 MMOL/L — SIGNIFICANT CHANGE UP (ref 17–32)
CREAT SERPL-MCNC: 0.9 MG/DL — SIGNIFICANT CHANGE UP (ref 0.7–1.5)
EGFR: 87 ML/MIN/1.73M2 — SIGNIFICANT CHANGE UP
EGFR: 87 ML/MIN/1.73M2 — SIGNIFICANT CHANGE UP
GLUCOSE BLDC GLUCOMTR-MCNC: 123 MG/DL — HIGH (ref 70–99)
GLUCOSE SERPL-MCNC: 128 MG/DL — HIGH (ref 70–99)
HCT VFR BLD CALC: 40 % — LOW (ref 42–52)
HCT VFR BLD CALC: 40.6 % — LOW (ref 42–52)
HGB BLD-MCNC: 13.2 G/DL — LOW (ref 14–18)
HGB BLD-MCNC: 13.5 G/DL — LOW (ref 14–18)
MCHC RBC-ENTMCNC: 26 PG — LOW (ref 27–31)
MCHC RBC-ENTMCNC: 26.1 PG — LOW (ref 27–31)
MCHC RBC-ENTMCNC: 33 G/DL — SIGNIFICANT CHANGE UP (ref 32–37)
MCHC RBC-ENTMCNC: 33.3 G/DL — SIGNIFICANT CHANGE UP (ref 32–37)
MCV RBC AUTO: 78.5 FL — LOW (ref 80–94)
MCV RBC AUTO: 78.9 FL — LOW (ref 80–94)
NRBC BLD AUTO-RTO: 0 /100 WBCS — SIGNIFICANT CHANGE UP (ref 0–0)
NRBC BLD AUTO-RTO: 0 /100 WBCS — SIGNIFICANT CHANGE UP (ref 0–0)
PLATELET # BLD AUTO: 171 K/UL — SIGNIFICANT CHANGE UP (ref 130–400)
PLATELET # BLD AUTO: 195 K/UL — SIGNIFICANT CHANGE UP (ref 130–400)
PMV BLD: 10.4 FL — SIGNIFICANT CHANGE UP (ref 7.4–10.4)
PMV BLD: 9.9 FL — SIGNIFICANT CHANGE UP (ref 7.4–10.4)
POTASSIUM SERPL-MCNC: 3.5 MMOL/L — SIGNIFICANT CHANGE UP (ref 3.5–5)
POTASSIUM SERPL-SCNC: 3.5 MMOL/L — SIGNIFICANT CHANGE UP (ref 3.5–5)
PROCALCITONIN SERPL-MCNC: 0.08 NG/ML — SIGNIFICANT CHANGE UP (ref 0.02–0.1)
RBC # BLD: 5.07 M/UL — SIGNIFICANT CHANGE UP (ref 4.7–6.1)
RBC # BLD: 5.17 M/UL — SIGNIFICANT CHANGE UP (ref 4.7–6.1)
RBC # FLD: 13.1 % — SIGNIFICANT CHANGE UP (ref 11.5–14.5)
RBC # FLD: 13.2 % — SIGNIFICANT CHANGE UP (ref 11.5–14.5)
SODIUM SERPL-SCNC: 136 MMOL/L — SIGNIFICANT CHANGE UP (ref 135–146)
WBC # BLD: 6.1 K/UL — SIGNIFICANT CHANGE UP (ref 4.8–10.8)
WBC # BLD: 6.37 K/UL — SIGNIFICANT CHANGE UP (ref 4.8–10.8)
WBC # FLD AUTO: 6.1 K/UL — SIGNIFICANT CHANGE UP (ref 4.8–10.8)
WBC # FLD AUTO: 6.37 K/UL — SIGNIFICANT CHANGE UP (ref 4.8–10.8)

## 2025-07-14 PROCEDURE — 93010 ELECTROCARDIOGRAM REPORT: CPT

## 2025-07-14 PROCEDURE — 99223 1ST HOSP IP/OBS HIGH 75: CPT

## 2025-07-14 PROCEDURE — 99222 1ST HOSP IP/OBS MODERATE 55: CPT

## 2025-07-14 PROCEDURE — 99233 SBSQ HOSP IP/OBS HIGH 50: CPT

## 2025-07-14 PROCEDURE — 76705 ECHO EXAM OF ABDOMEN: CPT | Mod: 26

## 2025-07-14 RX ORDER — INSULIN LISPRO 100 U/ML
4 INJECTION, SOLUTION INTRAVENOUS; SUBCUTANEOUS
Refills: 0 | Status: DISCONTINUED | OUTPATIENT
Start: 2025-07-14 | End: 2025-07-16

## 2025-07-14 RX ORDER — ENOXAPARIN SODIUM 100 MG/ML
90 INJECTION SUBCUTANEOUS EVERY 12 HOURS
Refills: 0 | Status: DISCONTINUED | OUTPATIENT
Start: 2025-07-14 | End: 2025-07-16

## 2025-07-14 RX ORDER — INSULIN GLARGINE-YFGN 100 [IU]/ML
12 INJECTION, SOLUTION SUBCUTANEOUS AT BEDTIME
Refills: 0 | Status: DISCONTINUED | OUTPATIENT
Start: 2025-07-14 | End: 2025-07-16

## 2025-07-14 RX ADMIN — INSULIN GLARGINE-YFGN 12 UNIT(S): 100 INJECTION, SOLUTION SUBCUTANEOUS at 21:58

## 2025-07-14 RX ADMIN — LISINOPRIL 20 MILLIGRAM(S): 5 TABLET ORAL at 04:30

## 2025-07-14 RX ADMIN — HEPARIN SODIUM 0 UNIT(S)/HR: 1000 INJECTION INTRAVENOUS; SUBCUTANEOUS at 04:29

## 2025-07-14 RX ADMIN — TAMSULOSIN HYDROCHLORIDE 0.4 MILLIGRAM(S): 0.4 CAPSULE ORAL at 21:58

## 2025-07-14 RX ADMIN — HEPARIN SODIUM 1100 UNIT(S)/HR: 1000 INJECTION INTRAVENOUS; SUBCUTANEOUS at 05:43

## 2025-07-14 RX ADMIN — ATORVASTATIN CALCIUM 20 MILLIGRAM(S): 80 TABLET, FILM COATED ORAL at 21:58

## 2025-07-14 RX ADMIN — ENOXAPARIN SODIUM 90 MILLIGRAM(S): 100 INJECTION SUBCUTANEOUS at 11:38

## 2025-07-14 RX ADMIN — Medication 1 APPLICATION(S): at 11:35

## 2025-07-14 NOTE — PROGRESS NOTE ADULT - ASSESSMENT
PRADEEP GARCIA (80y Male) with PMH of HTN, DM, HLD, CAD (diagnosed w/ CCTA in 05/2025), BPH. He is presenting for chest pain evaluation. He was found to have extensive bilateral PE. Admitted to SDU for close monitoring.     # Bilateral PE  - CTA chest 07/13/25: There is an acute pulmonary embolus in the distal right main pulmonary artery extending into the segmental and subsegmental branches of the right lower lobes. There is acute PE in the left segmental and subsegmental branches of the posterior inferior hemithorax. The main pulmonary artery is normal in caliber, measuring 2.1 cm.  - As per patient he was diagnosed with PE approximately 4 months ago but wasn't prescribed ant anticoagulation. However prior CTA chest in 04/2025 -- no evidence of PE  - USG doppler: no LE DVT  - ECHO:  EF 68%. No regional wall motion abnormalities, Mild (grade 1) left ventricular diastolic dysfunction. Right ventricular free wall strain is -18 %. Right ventricular four chamber strain is -12.0 %. The RV to LV ratio is 0.8. RV to LV ratio > 1 is supportive of right ventricular dysfunction.  - Trop 36 > 36  - Not a candidate for thrombectomy. Appreciate pulm and IR eval.   - C/w therapeutic Lovenox   - Awaiting recs from hem-onc     # Incidental CT findings of liver  - CTA chest: There are multiple indeterminate hypodensities in the right lobe of the liver, similar to prior.  - RUQ USG    # CAD  - Diagnosed w/ CCTA  - Normal nuclear stress test in 04/2025   - Allergic to ASA. C/w atorvastatin.     # DM   - C/w Insulin Basal/Bolus (18 / 6-6-6) w/ ISS    # HTN  - C/w lisinopril     # BPH - C/w Flomax    # HLD - C/w atorvastatin 20 mg    # Prophylactic measures   - DVT PPx: on therapeutic Lovenox  - GI PPx: PPI    Physician Handoff  - Monitor vitals. F/u hem-onc. RUQ USG.   - DG to Tele     55 minutes spent on total encounter. The necessity of the time spent during the encounter on this date of service was due to: time spent on review of labs, imaging studies, old records, obtaining history, personally examining patient, entering orders for medications/tests/etc, discussions with other health care providers, documentation in electronic health records, independent interpretation of labs, imaging/procedure results and care coordination.           PRADEEP GARCIA (80y Male) with PMH of HTN, DM, HLD, CAD (diagnosed w/ CCTA in 05/2025), BPH. He is presenting for chest pain evaluation. He was found to have extensive bilateral PE. Admitted to SDU for close monitoring.     # Bilateral PE  - CTA chest 07/13/25: There is an acute pulmonary embolus in the distal right main pulmonary artery extending into the segmental and subsegmental branches of the right lower lobes. There is acute PE in the left segmental and subsegmental branches of the posterior inferior hemithorax. The main pulmonary artery is normal in caliber, measuring 2.1 cm.  - As per patient he was diagnosed with PE approximately 4 months ago but wasn't prescribed ant anticoagulation. However prior CTA chest in 04/2025 -- no evidence of PE  - USG doppler: no LE DVT  - ECHO:  EF 68%. No regional wall motion abnormalities, Mild (grade 1) left ventricular diastolic dysfunction. Right ventricular free wall strain is -18 %. Right ventricular four chamber strain is -12.0 %. The RV to LV ratio is 0.8. RV to LV ratio > 1 is supportive of right ventricular dysfunction.  - Trop 36 > 36  - Not a candidate for thrombectomy. Appreciate pulm and IR eval.   - C/w therapeutic Lovenox   - Awaiting recs from hem-onc     # Incidental CT findings of liver  - CTA chest: There are multiple indeterminate hypodensities in the right lobe of the liver, similar to prior.  - RUQ USG    # CAD  - Diagnosed w/ CCTA  - Normal nuclear stress test in 04/2025   - Allergic to ASA. C/w atorvastatin.     # DM   - Reduce to Insulin Basal/Bolus (12 /4-4-4) w/ ISS    # HTN  - C/w lisinopril     # BPH - C/w Flomax    # HLD - C/w atorvastatin 20 mg    # Prophylactic measures   - DVT PPx: on therapeutic Lovenox  - GI PPx: PPI    Physician Handoff  - Monitor vitals. F/u hem-onc. RUQ USG.   - DG to Tele     55 minutes spent on total encounter. The necessity of the time spent during the encounter on this date of service was due to: time spent on review of labs, imaging studies, old records, obtaining history, personally examining patient, entering orders for medications/tests/etc, discussions with other health care providers, documentation in electronic health records, independent interpretation of labs, imaging/procedure results and care coordination.

## 2025-07-14 NOTE — PROGRESS NOTE ADULT - ASSESSMENT
Patient is a 80-year-old male with PMHx signifcant for HTN, HLD, Diabetes, PE (supposedly) not on AC, CAD with CAD RADS 2 on CCTA in 5/2025 presents with evaluation of chest pain.    #Updates 7.14.2025  - Thrombectomy today, patient is NPO  - Patient placed on Lovenox 40mg QD for anticoag  - Procalcitonin 0.08, repeat pending.  - F/u Heme/onc outpatient to determine unknown PE history/imaging.    #Bilateral PEs  #Pulmonary infarction  - Bilateral PEs on CTA, with evidence of some RV strain on echo (7.14.2025).  - Pro-BNP < 36 for 2 days now, stable.  - Troponins stable, last one was 11 on 7.13.2025  - Currently on heparin gtt, will place on Lovenox as well.      - Repeat aPTT q6h and adjust heparin as per protocol.  - Procalcitonin 0.08, repeat 7.15.2025.  - Receiving thrombectomy today, patient is NPO.    #CAD  - Checked HIE. Cardio note from Dr. Brito: Had nuclear MPI in April 25 - negative, CCTA in May 25 - mild disease. Referred for cardiology evaluation after the last visit.Was seen by Dr Horton in 2019 Pt has occasional chest discomfort with and without exertion, SOB with exertion. Reports BP at home improved from 160's to 130th TTE 5/16/24 EF 55%-60% Grade IDD 4/25/25 Lexiscan no ischemia EF 55% 5/10/25 CCTA Calcium Score 62 CAD-RADs 2 L Main artery mild stenosis, Prox LAD mild stenosis Cancelled R Hip surgery due to uncontrolled DM 6/10/25 HgbA1C 7.1 Chol 145 LDL 66 Trig 119 K 3.2 BUN 13/ cr 1.    #HTN  - Continue with Lisinopril 20mg QD  - Hold chlorthalidone 25mg QD    #DM  - On metformin at home, but stopped taking it due to chest pain.   - On Lantus 18u QHS + Ademlog 6u TID before meals + ISS    #HLD  - Continue with atorvastatin 20mg QD.    DVT PPX: Heparin + Lovenox  GI PPX: Protonix 40mg QD  Diet: Dash, NPO until after thrombectomy  Dispo: Can go to floors 24h after procedure.
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
## 2025-07-14 NOTE — CONSULT NOTE ADULT - CONSULT REASON
Had PE diagnosed 4 months ago. Saw a hematologist recently, never took meds. Now it has gotten more extensive. ? unprovoked PE Hx
PE

## 2025-07-14 NOTE — CONSULT NOTE ADULT - SUBJECTIVE AND OBJECTIVE BOX
Hematology/Oncology Consult Note    HPI:  80-year-old male past medical history hypertension, hyperlipidemia, diabetes, PE not on AC (never took any meds), CAD with CAD RADS 2 on CCTA ( Dr. Brito, notes L Main artery mild stenosis, Prox LAD mild stenosis)  in May presents for evaluation of chest pain. Pt states that he has been having 4/10 chest pain on exertion for the last 8 months. Diagnosed with a PE 4 months ago, saw heme but "I never got any meds", No one has given him any explanation as to why the PE happened. Pt attributes it to his high BP.  Patient states he was recently started on metformin for his diabetes and whenever he takes the medication he gets right-sided chest pain- no longer wants to take it.  No relieving factors.  Denies fever, headache, cough, shortness of breath, abdominal pain, dysuria, hematuria, vomiting, diarrhea, SOB at rest..    Vitals: /84, HR 78, RR 16, T 98.7, RR 16, 97% on RA   CBC WNL, BMP Na 127, K hemoylzed- pending repeat, BNP 36, Trop 11, coags WNL   EKG: TWI in III, ? ST depression in AVF  CXR: largely unchanged from prior- ? LA?    CT chest angio: Acute bilateral PEs in the right main pulmonary artery, segmental and   subsegmental branches of the lower hemithorax, and left segmental and   subsegmental branches of the lower hemithorax.  Questionable right heart strain with RV: LV ratio of approximately 1.   Consider echo for further evaluation.  Wedge-shaped areas of consolidation in the inferior right middle lobe and   in the basilar left upper lobe, likely infarcted lung.    Heparin gtt started. PERT team activated per ED staff     (13 Jul 2025 13:39)      Oncology Hx:      Allergies    aspirin (Nausea)    Intolerances        MEDICATIONS  (STANDING):  atorvastatin 20 milliGRAM(s) Oral at bedtime  chlorhexidine 2% Cloths 1 Application(s) Topical daily  dextrose 5%. 1000 milliLiter(s) (100 mL/Hr) IV Continuous <Continuous>  dextrose 5%. 1000 milliLiter(s) (50 mL/Hr) IV Continuous <Continuous>  dextrose 50% Injectable 25 Gram(s) IV Push once  dextrose 50% Injectable 12.5 Gram(s) IV Push once  dextrose 50% Injectable 25 Gram(s) IV Push once  enoxaparin Injectable 90 milliGRAM(s) SubCutaneous every 12 hours  glucagon  Injectable 1 milliGRAM(s) IntraMuscular once  insulin glargine Injectable (LANTUS) 18 Unit(s) SubCutaneous at bedtime  insulin lispro (ADMELOG) corrective regimen sliding scale   SubCutaneous Before meals and at bedtime  insulin lispro Injectable (ADMELOG) 6 Unit(s) SubCutaneous three times a day before meals  lisinopril 20 milliGRAM(s) Oral daily  pantoprazole    Tablet 40 milliGRAM(s) Oral before breakfast  tamsulosin 0.4 milliGRAM(s) Oral at bedtime    MEDICATIONS  (PRN):  acetaminophen     Tablet .. 650 milliGRAM(s) Oral every 6 hours PRN Temp greater or equal to 38C (100.4F), Mild Pain (1 - 3)  aluminum hydroxide/magnesium hydroxide/simethicone Suspension 30 milliLiter(s) Oral every 4 hours PRN Dyspepsia  dextrose Oral Gel 15 Gram(s) Oral once PRN Blood Glucose LESS THAN 70 milliGRAM(s)/deciliter  melatonin 3 milliGRAM(s) Oral at bedtime PRN Insomnia  ondansetron Injectable 4 milliGRAM(s) IV Push every 8 hours PRN Nausea and/or Vomiting      PAST MEDICAL & SURGICAL HISTORY:  HTN (hypertension)      HLD (hyperlipidemia)      Pulmonary embolism      DM (diabetes mellitus)      CAD (coronary artery disease)      H/O colonoscopy      H/O endoscopy          FAMILY HISTORY:  No family history of cardiac disease (Father, Mother, Sibling)    No family history of cerebrovascular accident (CVA) (Father, Mother, Sibling)        SOCIAL HISTORY: No EtOH, no tobacco    REVIEW OF SYSTEMS:    CONSTITUTIONAL: No weakness, fevers or chills  EYES/ENT: No visual changes;  No vertigo or throat pain   NECK: No pain or stiffness  RESPIRATORY: No cough, wheezing, hemoptysis; No shortness of breath  CARDIOVASCULAR: No chest pain or palpitations  GASTROINTESTINAL: No abdominal or epigastric pain. No nausea, vomiting, or hematemesis; No diarrhea or constipation. No melena or hematochezia.  GENITOURINARY: No dysuria, frequency or hematuria  NEUROLOGICAL: No numbness or weakness  SKIN: No itching, burning, rashes, or lesions   All other review of systems is negative unless indicated above.    Height (cm): 172.7 (07-13 @ 20:00)  Weight (kg): 92.8 (07-13 @ 20:00)  BMI (kg/m2): 31.1 (07-13 @ 20:00)  BSA (m2): 2.06 (07-13 @ 20:00)    T(F): 98.1 (07-14-25 @ 08:17), Max: 98.7 (07-13-25 @ 20:00)  HR: 52 (07-14-25 @ 08:17)  BP: 154/69 (07-14-25 @ 08:17)  RR: 20 (07-14-25 @ 08:17)  SpO2: 95% (07-14-25 @ 08:17)  Wt(kg): --    GENERAL: NAD, well-developed  HEAD:  Atraumatic, Normocephalic  EYES: EOMI, PERRLA, conjunctiva and sclera clear  NECK: Supple, No JVD  CHEST/LUNG: Clear to auscultation bilaterally; No wheeze  HEART: Regular rate and rhythm; No murmurs, rubs, or gallops  ABDOMEN: Soft, Nontender, Nondistended; Bowel sounds present  EXTREMITIES:  2+ Peripheral Pulses, No clubbing, cyanosis, or edema  NEUROLOGY: non-focal  SKIN: No rashes or lesions                          13.2   6.10  )-----------( 195      ( 14 Jul 2025 05:12 )             40.0       07-14    136  |  99  |  11  ----------------------------<  128[H]  3.5   |  21  |  0.9    Ca    8.7      14 Jul 2025 05:12  Mg     1.9     07-13    TPro  6.5  /  Alb  3.8  /  TBili  0.6  /  DBili  x   /  AST  32  /  ALT  19  /  AlkPhos  95  07-13      Magnesium: 1.9 mg/dL (07-13 @ 17:13)       Hematology/Oncology Consult Note    HPI:  80-year-old male past medical history hypertension, hyperlipidemia, diabetes, PE not on AC (never took any meds), CAD with CAD RADS 2 on CCTA ( Dr. Brito, notes L Main artery mild stenosis, Prox LAD mild stenosis)  in May presents for evaluation of chest pain. Pt states that he has been having 4/10 chest pain on exertion for the last 8 months. Diagnosed with a PE 4 months ago, saw heme but "I never got any meds", No one has given him any explanation as to why the PE happened. Pt attributes it to his high BP.  Patient states he was recently started on metformin for his diabetes and whenever he takes the medication he gets right-sided chest pain- no longer wants to take it.  No relieving factors.  Denies fever, headache, cough, shortness of breath, abdominal pain, dysuria, hematuria, vomiting, diarrhea, SOB at rest..    Vitals: /84, HR 78, RR 16, T 98.7, RR 16, 97% on RA   CBC WNL, BMP Na 127, K hemoylzed- pending repeat, BNP 36, Trop 11, coags WNL   EKG: TWI in III, ? ST depression in AVF  CXR: largely unchanged from prior- ? LA?    CT chest angio: Acute bilateral PEs in the right main pulmonary artery, segmental and   subsegmental branches of the lower hemithorax, and left segmental and   subsegmental branches of the lower hemithorax.  Questionable right heart strain with RV: LV ratio of approximately 1.   Consider echo for further evaluation.  Wedge-shaped areas of consolidation in the inferior right middle lobe and   in the basilar left upper lobe, likely infarcted lung.    Heparin gtt started. PERT team activated per ED staff     (13 Jul 2025 13:39)      Interval history:   - 7/14 Patient was seen and evaluated at bedside.  - Patient states he had his previous PE episode following a hip dislocation in an accident.   - Patient recommended on indefinite use of eliquis for anticoagulation.      Allergies    aspirin (Nausea)    Intolerances        MEDICATIONS  (STANDING):  atorvastatin 20 milliGRAM(s) Oral at bedtime  chlorhexidine 2% Cloths 1 Application(s) Topical daily  dextrose 5%. 1000 milliLiter(s) (100 mL/Hr) IV Continuous <Continuous>  dextrose 5%. 1000 milliLiter(s) (50 mL/Hr) IV Continuous <Continuous>  dextrose 50% Injectable 25 Gram(s) IV Push once  dextrose 50% Injectable 12.5 Gram(s) IV Push once  dextrose 50% Injectable 25 Gram(s) IV Push once  enoxaparin Injectable 90 milliGRAM(s) SubCutaneous every 12 hours  glucagon  Injectable 1 milliGRAM(s) IntraMuscular once  insulin glargine Injectable (LANTUS) 18 Unit(s) SubCutaneous at bedtime  insulin lispro (ADMELOG) corrective regimen sliding scale   SubCutaneous Before meals and at bedtime  insulin lispro Injectable (ADMELOG) 6 Unit(s) SubCutaneous three times a day before meals  lisinopril 20 milliGRAM(s) Oral daily  pantoprazole    Tablet 40 milliGRAM(s) Oral before breakfast  tamsulosin 0.4 milliGRAM(s) Oral at bedtime    MEDICATIONS  (PRN):  acetaminophen     Tablet .. 650 milliGRAM(s) Oral every 6 hours PRN Temp greater or equal to 38C (100.4F), Mild Pain (1 - 3)  aluminum hydroxide/magnesium hydroxide/simethicone Suspension 30 milliLiter(s) Oral every 4 hours PRN Dyspepsia  dextrose Oral Gel 15 Gram(s) Oral once PRN Blood Glucose LESS THAN 70 milliGRAM(s)/deciliter  melatonin 3 milliGRAM(s) Oral at bedtime PRN Insomnia  ondansetron Injectable 4 milliGRAM(s) IV Push every 8 hours PRN Nausea and/or Vomiting      PAST MEDICAL & SURGICAL HISTORY:  HTN (hypertension)      HLD (hyperlipidemia)      Pulmonary embolism      DM (diabetes mellitus)      CAD (coronary artery disease)      H/O colonoscopy      H/O endoscopy          FAMILY HISTORY:  No family history of cardiac disease (Father, Mother, Sibling)    No family history of cerebrovascular accident (CVA) (Father, Mother, Sibling)        SOCIAL HISTORY: No EtOH, no tobacco    REVIEW OF SYSTEMS:    CONSTITUTIONAL: No weakness, fevers or chills  EYES/ENT: No visual changes;  No vertigo or throat pain   NECK: No pain or stiffness  RESPIRATORY: No cough, wheezing, hemoptysis; No shortness of breath  CARDIOVASCULAR: No chest pain or palpitations  GASTROINTESTINAL: No abdominal or epigastric pain. No nausea, vomiting, or hematemesis; No diarrhea or constipation. No melena or hematochezia.  GENITOURINARY: No dysuria, frequency or hematuria  NEUROLOGICAL: No numbness or weakness  SKIN: No itching, burning, rashes, or lesions   All other review of systems is negative unless indicated above.    Height (cm): 172.7 (07-13 @ 20:00)  Weight (kg): 92.8 (07-13 @ 20:00)  BMI (kg/m2): 31.1 (07-13 @ 20:00)  BSA (m2): 2.06 (07-13 @ 20:00)    T(F): 98.1 (07-14-25 @ 08:17), Max: 98.7 (07-13-25 @ 20:00)  HR: 52 (07-14-25 @ 08:17)  BP: 154/69 (07-14-25 @ 08:17)  RR: 20 (07-14-25 @ 08:17)  SpO2: 95% (07-14-25 @ 08:17)  Wt(kg): --    GENERAL: NAD, well-developed  HEAD:  Atraumatic, Normocephalic  EYES: EOMI, PERRLA, conjunctiva and sclera clear  NECK: Supple, No JVD  CHEST/LUNG: Clear to auscultation bilaterally; No wheeze  HEART: Regular rate and rhythm; No murmurs, rubs, or gallops  ABDOMEN: Soft, Nontender, Nondistended; Bowel sounds present  EXTREMITIES:  2+ Peripheral Pulses, No clubbing, cyanosis, or edema  NEUROLOGY: non-focal  SKIN: No rashes or lesions                          13.2   6.10  )-----------( 195      ( 14 Jul 2025 05:12 )             40.0       07-14    136  |  99  |  11  ----------------------------<  128[H]  3.5   |  21  |  0.9    Ca    8.7      14 Jul 2025 05:12  Mg     1.9     07-13    TPro  6.5  /  Alb  3.8  /  TBili  0.6  /  DBili  x   /  AST  32  /  ALT  19  /  AlkPhos  95  07-13      Magnesium: 1.9 mg/dL (07-13 @ 17:13)        Radiology  CT angio chest: Multiple filling defects within the pulmonary arterial branches as   described above. RV/LV ratio of 1.19. Right middle lobe consolidation   which may represent pulmonary infarct. Trace right pleural effusion

## 2025-07-14 NOTE — CHART NOTE - NSCHARTNOTEFT_GEN_A_CORE
SDU Transfer Note    Transfer from: SDU    Transfer to: (  ) Medicine    ( X ) Telemetry     (   ) RCU        (    ) Palliative         (   ) Stroke Unit          (   ) __________________    Accepting Physician:    Signout given to:       HPI: 80-year-old male past medical history hypertension, hyperlipidemia, diabetes, PE not on AC (never took any meds), CAD with CAD RADS 2 on CCTA ( Dr. Brito, notes L Main artery mild stenosis, Prox LAD mild stenosis)  in May presents for evaluation of chest pain. Pt states that he has been having 4/10 chest pain on exertion for the last 8 months. Diagnosed with a PE 4 months ago, saw heme but "I never got any meds" Patient is a poor historian.     Patient presents clinically stable on room with no active shortness of breath. Patient reports resolution of chest pain since being admitted. Patient had a lower extremity duplex significant of no evidence of any DVTs. Patient was tolerating heparin infusion and now has bridged to Lovenox. Please continue anticoagulation at this time, no clinical indication for a thrombectomy. If patient symptoms worsen please contact IR.       Primary Diagnosis: Acute bilateral pulmonary emboli (PE) with lung infarction.      SDU Course:    PE/Lung Infarction: Underwent successful thrombectomy. Transitioned from heparin gtt to Lovenox 40mg SC daily. Pro-BNP and troponin trending down. Procalcitonin low. No longer requires telemetry monitoring. Continue anticoagulation and monitor for recurrence of symptoms. Outpatient follow-up with heme/onc to clarify prior PE history and arrange long-term anticoagulation plan.  CAD: Stable, no angina since admission. Continue current medical management. Cardiology follow-up can be arranged outpatient per Dr. Brito's recommendation.  HTN, DM, HLD: Medications adjusted and optimized. Blood glucose and blood pressure well-controlled. Continue home medications with adjustments as needed.  Diet: Advanced to regular diet as tolerated post-thrombectomy.  DVT Prophylaxis: Continue Lovenox.  GI Prophylaxis: Continue pantoprazole.  Current Status: Hemodynamically stable, no complaints of chest pain or shortness of breath. Ambulating well.    Pending: Heme/onc consult regarding long-term anticoagulation.    Plan: Continue current management. Monitor for signs/symptoms of recurrent PE. Educate patient on importance of anticoagulation compliance and follow-up appointments. Ready for transfer to floor. Full code status. SDU Transfer Note    Transfer from: SDU    Transfer to: (  ) Medicine    ( X ) Telemetry     (   ) RCU        (    ) Palliative         (   ) Stroke Unit          (   ) __________________    Accepting Physician:    Signout given to: Dr. Meena Orozco      HPI: 80-year-old male past medical history hypertension, hyperlipidemia, diabetes, PE not on AC (never took any meds), CAD with CAD RADS 2 on CCTA ( Dr. Brito, notes L Main artery mild stenosis, Prox LAD mild stenosis)  in May presents for evaluation of chest pain. Pt states that he has been having 4/10 chest pain on exertion for the last 8 months. Diagnosed with a PE 4 months ago, saw heme but "I never got any meds" Patient is a poor historian.     Patient presents clinically stable on room with no active shortness of breath. Patient reports resolution of chest pain since being admitted. Patient had a lower extremity duplex significant of no evidence of any DVTs. Patient was tolerating heparin infusion and now has bridged to Lovenox. Please continue anticoagulation at this time, no clinical indication for a thrombectomy. If patient symptoms worsen please contact IR.       Primary Diagnosis: Acute bilateral pulmonary emboli (PE) with lung infarction.      SDU Course:    PE/Lung Infarction: Underwent successful thrombectomy. Transitioned from heparin gtt to Lovenox 40mg SC daily. Pro-BNP and troponin trending down. Procalcitonin low. No longer requires telemetry monitoring. Continue anticoagulation and monitor for recurrence of symptoms. Outpatient follow-up with heme/onc to clarify prior PE history and arrange long-term anticoagulation plan.  CAD: Stable, no angina since admission. Continue current medical management. Cardiology follow-up can be arranged outpatient per Dr. Brito's recommendation.  HTN, DM, HLD: Medications adjusted and optimized. Blood glucose and blood pressure well-controlled. Continue home medications with adjustments as needed.  Diet: Advanced to regular diet as tolerated post-thrombectomy.  DVT Prophylaxis: Continue Lovenox.  GI Prophylaxis: Continue pantoprazole.  Current Status: Hemodynamically stable, no complaints of chest pain or shortness of breath. Ambulating well.    Pending: Heme/onc consult regarding long-term anticoagulation.    Plan: Continue current management. Monitor for signs/symptoms of recurrent PE. Educate patient on importance of anticoagulation compliance and follow-up appointments. Ready for transfer to floor. Full code status.

## 2025-07-14 NOTE — CONSULT NOTE ADULT - ASSESSMENT
IMPRESSION:    Intermediate low risk PE/ markers noted  CP/ infarct  HO hypertension, hyperlipidemia, diabetes    PLAN:    CNS: Avoid CNS depressant    HEENT:  Oral care    PULMONARY:  HOB @ 45 degrees, aspiration precaution, on RA    CARDIOVASCULAR: FULL AC, echo    GI: GI prophylaxis                                          Feeding npo if procedure    RENAL:  F/u  lytes.  Correct as needed. accurate I/O    INFECTIOUS DISEASE: procal    HEMATOLOGICAL:  DVT tx, LE doppler    ENDOCRINE:  Follow up FS.  Insulin protocol if needed.    SDU  
80-year-old male past medical history hypertension, hyperlipidemia, diabetes, PE not currently on AC, CAD with CAD RADS 2 on CCTA ( Dr. Brito, notes L Main artery mild stenosis, Prox LAD mild stenosis) in May presents for evaluation of chest pain. Patient was admitted for acute b/l pulmonary embolisms in the right main pulmonary artery, segmental and subsegmental branches of the lower hemithorax, and left segmental and subsegmental branches of the lower hemithorax. Heme/onc consulted for anticoagulation use for PE.    #B/l unprovoked Pulmonary embolism  #hx of PE in 2021  - HD stable, room air  - colonoscopy results in 2021 showed no signs of colorectal carcinoma.   Patient states he had been taking eliquis following his first episode of PE four years ago, and took it for about 10 months, before his PCP discontinued it. Patient states he had his previous PE episode following a hip dislocation in an accident. Otherwise had no prior surgeries or fractures prior to onset of first PE episode, and no history of cancer. Patient likely has unprovoked PE given lack of risk factors and require indefinite use of anticoagulation.    Plan  - Recommend indefinite anticoagulant use, loading dose of 10mg eliquis BID for the first week, and 5mg BID for the second week and onward.  - Provided patient education and discussed with patient and patient family about importance of long-term anticoagulation adherence.  - Follow up in heme/onc outpatient [facility] in 4 weeks.

## 2025-07-14 NOTE — CONSULT NOTE ADULT - SUBJECTIVE AND OBJECTIVE BOX
Patient is a 80y old  Male who presents with a chief complaint of PE    80-year-old male past medical history hypertension, hyperlipidemia, diabetes, CAD with CAD RADS 2 on CCTA ( Dr. Brito, notes L Main artery mild stenosis, Prox LAD mild stenosis)  in May presents for evaluation of chest pain. Pt states that he has been having 4/10 chest pain on exertion for the last 8 months. Patient states he was recently started on metformin for his diabetes and whenever he takes the medication he gets right-sided chest pain- no longer wants to take it.  No relieving factors.  Denies fever, headache, cough, shortness of breath, abdominal pain, dysuria, hematuria, vomiting, diarrhea, SOB at rest..    Vitals: /84, HR 78, RR 16, T 98.7, RR 16, 97% on RA   CBC WNL, BMP Na 127, K hemoylzed- pending repeat, BNP 36, Trop 11, coags WNL   EKG: TWI in III, ? ST depression in AVF  CXR: largely unchanged from prior- ? LA?    CT chest angio: Acute bilateral PEs in the right main pulmonary artery, segmental and   subsegmental branches of the lower hemithorax, and left segmental and   subsegmental branches of the lower hemithorax.  Questionable right heart strain with RV: LV ratio of approximately 1.   Consider echo for further evaluation.  Wedge-shaped areas of consolidation in the inferior right middle lobe and   in the basilar left upper lobe, likely infarcted lung.    Heparin gtt started. PERT team activated per ED staff admitted to SDU, called to evaluate      PAST MEDICAL & SURGICAL HISTORY:  HTN (hypertension)      HLD (hyperlipidemia)      Pulmonary embolism      DM (diabetes mellitus)      CAD (coronary artery disease)      H/O colonoscopy      H/O endoscopy          SOCIAL HX:   Smoking   -    FAMILY HISTORY:  No family history of cardiac disease (Father, Mother, Sibling)    No family history of cerebrovascular accident (CVA) (Father, Mother, Sibling)        REVIEW OF SYSTEMS see HPI    Allergic/Immunologic:	    Allergies    aspirin (Nausea)    Intolerances        acetaminophen     Tablet .. 650 milliGRAM(s) Oral every 6 hours PRN  aluminum hydroxide/magnesium hydroxide/simethicone Suspension 30 milliLiter(s) Oral every 4 hours PRN  atorvastatin 20 milliGRAM(s) Oral at bedtime  chlorhexidine 2% Cloths 1 Application(s) Topical daily  dextrose 5%. 1000 milliLiter(s) IV Continuous <Continuous>  dextrose 5%. 1000 milliLiter(s) IV Continuous <Continuous>  dextrose 50% Injectable 25 Gram(s) IV Push once  dextrose 50% Injectable 12.5 Gram(s) IV Push once  dextrose 50% Injectable 25 Gram(s) IV Push once  dextrose Oral Gel 15 Gram(s) Oral once PRN  glucagon  Injectable 1 milliGRAM(s) IntraMuscular once  heparin   Injectable 7500 Unit(s) IV Push every 6 hours PRN  heparin   Injectable 3500 Unit(s) IV Push every 6 hours PRN  heparin  Infusion. 1400 Unit(s)/Hr IV Continuous <Continuous>  insulin glargine Injectable (LANTUS) 18 Unit(s) SubCutaneous at bedtime  insulin lispro (ADMELOG) corrective regimen sliding scale   SubCutaneous Before meals and at bedtime  insulin lispro Injectable (ADMELOG) 6 Unit(s) SubCutaneous three times a day before meals  lisinopril 20 milliGRAM(s) Oral daily  melatonin 3 milliGRAM(s) Oral at bedtime PRN  ondansetron Injectable 4 milliGRAM(s) IV Push every 8 hours PRN  pantoprazole    Tablet 40 milliGRAM(s) Oral before breakfast  tamsulosin 0.4 milliGRAM(s) Oral at bedtime  : Home Meds:      PHYSICAL EXAM    ICU Vital Signs Last 24 Hrs  T(C): 36.6 (14 Jul 2025 04:00), Max: 37.1 (13 Jul 2025 09:30)  T(F): 97.9 (14 Jul 2025 04:00), Max: 98.7 (13 Jul 2025 09:30)  HR: 57 (14 Jul 2025 04:00) (55 - 78)  BP: 156/73 (14 Jul 2025 04:00) (125/65 - 180/75)  RR: 95 (14 Jul 2025 04:00) (16 - 95)  SpO2: 98% (14 Jul 2025 00:00) (96% - 98%)    O2 Parameters below as of 13 Jul 2025 22:30  Patient On (Oxygen Delivery Method): room air            General: Comfortable on RA  Lungs: Dec bs both bases  Cardiovascular: Regular  Abdomen: Soft, Positive BS  Extremities: No clubbing  Neurological: Non focal       07-13-25 @ 07:01  -  07-14-25 @ 07:00  --------------------------------------------------------  IN:  Total IN: 0 mL    OUT:    Voided (mL): 400 mL  Total OUT: 400 mL    Total NET: -400 mL          LABS:                          13.2   6.10  )-----------( 195      ( 14 Jul 2025 05:12 )             40.0                                               07-13    134[L]  |  97[L]  |  10  ----------------------------<  190[H]  3.7   |  21  |  1.0    Ca    9.6      13 Jul 2025 20:59  Mg     1.9     07-13    TPro  6.5  /  Alb  3.8  /  TBili  0.6  /  DBili  x   /  AST  32  /  ALT  19  /  AlkPhos  95  07-13      PT/INR - ( 13 Jul 2025 11:26 )   PT: 12.30 sec;   INR: 1.04 ratio         PTT - ( 14 Jul 2025 03:32 )  PTT:>200.0 sec                                       Urinalysis Basic - ( 13 Jul 2025 20:59 )    Color: x / Appearance: x / SG: x / pH: x  Gluc: 190 mg/dL / Ketone: x  / Bili: x / Urobili: x   Blood: x / Protein: x / Nitrite: x   Leuk Esterase: x / RBC: x / WBC x   Sq Epi: x / Non Sq Epi: x / Bacteria: x                                                  LIVER FUNCTIONS - ( 13 Jul 2025 20:59 )  Alb: 3.8 g/dL / Pro: 6.5 g/dL / ALK PHOS: 95 U/L / ALT: 19 U/L / AST: 32 U/L / GGT: x                                                                                                                                           MEDICATIONS  (STANDING):  atorvastatin 20 milliGRAM(s) Oral at bedtime  chlorhexidine 2% Cloths 1 Application(s) Topical daily  dextrose 5%. 1000 milliLiter(s) (100 mL/Hr) IV Continuous <Continuous>  dextrose 5%. 1000 milliLiter(s) (50 mL/Hr) IV Continuous <Continuous>  dextrose 50% Injectable 25 Gram(s) IV Push once  dextrose 50% Injectable 12.5 Gram(s) IV Push once  dextrose 50% Injectable 25 Gram(s) IV Push once  glucagon  Injectable 1 milliGRAM(s) IntraMuscular once  heparin  Infusion. 1400 Unit(s)/Hr (14 mL/Hr) IV Continuous <Continuous>  insulin glargine Injectable (LANTUS) 18 Unit(s) SubCutaneous at bedtime  insulin lispro (ADMELOG) corrective regimen sliding scale   SubCutaneous Before meals and at bedtime  insulin lispro Injectable (ADMELOG) 6 Unit(s) SubCutaneous three times a day before meals  lisinopril 20 milliGRAM(s) Oral daily  pantoprazole    Tablet 40 milliGRAM(s) Oral before breakfast  tamsulosin 0.4 milliGRAM(s) Oral at bedtime    MEDICATIONS  (PRN):  acetaminophen     Tablet .. 650 milliGRAM(s) Oral every 6 hours PRN Temp greater or equal to 38C (100.4F), Mild Pain (1 - 3)  aluminum hydroxide/magnesium hydroxide/simethicone Suspension 30 milliLiter(s) Oral every 4 hours PRN Dyspepsia  dextrose Oral Gel 15 Gram(s) Oral once PRN Blood Glucose LESS THAN 70 milliGRAM(s)/deciliter  heparin   Injectable 7500 Unit(s) IV Push every 6 hours PRN For aPTT less than 40  heparin   Injectable 3500 Unit(s) IV Push every 6 hours PRN For aPTT between 40 - 57  melatonin 3 milliGRAM(s) Oral at bedtime PRN Insomnia  ondansetron Injectable 4 milliGRAM(s) IV Push every 8 hours PRN Nausea and/or Vomiting    CT chest noted

## 2025-07-15 LAB
A1C WITH ESTIMATED AVERAGE GLUCOSE RESULT: 7.7 % — HIGH (ref 4–5.6)
ALBUMIN SERPL ELPH-MCNC: 3.6 G/DL — SIGNIFICANT CHANGE UP (ref 3.5–5.2)
ALP SERPL-CCNC: 96 U/L — SIGNIFICANT CHANGE UP (ref 30–115)
ALT FLD-CCNC: 24 U/L — SIGNIFICANT CHANGE UP (ref 0–41)
ANION GAP SERPL CALC-SCNC: 16 MMOL/L — HIGH (ref 7–14)
AST SERPL-CCNC: 27 U/L — SIGNIFICANT CHANGE UP (ref 0–41)
BILIRUB SERPL-MCNC: 0.6 MG/DL — SIGNIFICANT CHANGE UP (ref 0.2–1.2)
BUN SERPL-MCNC: 13 MG/DL — SIGNIFICANT CHANGE UP (ref 10–20)
CALCIUM SERPL-MCNC: 9 MG/DL — SIGNIFICANT CHANGE UP (ref 8.4–10.5)
CHLORIDE SERPL-SCNC: 98 MMOL/L — SIGNIFICANT CHANGE UP (ref 98–110)
CO2 SERPL-SCNC: 22 MMOL/L — SIGNIFICANT CHANGE UP (ref 17–32)
CREAT SERPL-MCNC: 0.9 MG/DL — SIGNIFICANT CHANGE UP (ref 0.7–1.5)
EGFR: 87 ML/MIN/1.73M2 — SIGNIFICANT CHANGE UP
EGFR: 87 ML/MIN/1.73M2 — SIGNIFICANT CHANGE UP
ESTIMATED AVERAGE GLUCOSE: 174 MG/DL — HIGH (ref 68–114)
GLUCOSE BLDC GLUCOMTR-MCNC: 127 MG/DL — HIGH (ref 70–99)
GLUCOSE BLDC GLUCOMTR-MCNC: 130 MG/DL — HIGH (ref 70–99)
GLUCOSE BLDC GLUCOMTR-MCNC: 167 MG/DL — HIGH (ref 70–99)
GLUCOSE BLDC GLUCOMTR-MCNC: 169 MG/DL — HIGH (ref 70–99)
GLUCOSE SERPL-MCNC: 130 MG/DL — HIGH (ref 70–99)
HCT VFR BLD CALC: 40.8 % — LOW (ref 42–52)
HGB BLD-MCNC: 13.5 G/DL — LOW (ref 14–18)
MAGNESIUM SERPL-MCNC: 1.8 MG/DL — SIGNIFICANT CHANGE UP (ref 1.8–2.4)
MCHC RBC-ENTMCNC: 26.1 PG — LOW (ref 27–31)
MCHC RBC-ENTMCNC: 33.1 G/DL — SIGNIFICANT CHANGE UP (ref 32–37)
MCV RBC AUTO: 78.8 FL — LOW (ref 80–94)
NRBC BLD AUTO-RTO: 0 /100 WBCS — SIGNIFICANT CHANGE UP (ref 0–0)
NT-PROBNP SERPL-SCNC: <36 PG/ML — SIGNIFICANT CHANGE UP (ref 0–300)
PLATELET # BLD AUTO: 224 K/UL — SIGNIFICANT CHANGE UP (ref 130–400)
PMV BLD: 9.8 FL — SIGNIFICANT CHANGE UP (ref 7.4–10.4)
POTASSIUM SERPL-MCNC: 3.6 MMOL/L — SIGNIFICANT CHANGE UP (ref 3.5–5)
POTASSIUM SERPL-SCNC: 3.6 MMOL/L — SIGNIFICANT CHANGE UP (ref 3.5–5)
PROT SERPL-MCNC: 6.3 G/DL — SIGNIFICANT CHANGE UP (ref 6–8)
RBC # BLD: 5.18 M/UL — SIGNIFICANT CHANGE UP (ref 4.7–6.1)
RBC # FLD: 13.1 % — SIGNIFICANT CHANGE UP (ref 11.5–14.5)
SODIUM SERPL-SCNC: 136 MMOL/L — SIGNIFICANT CHANGE UP (ref 135–146)
TROPONIN T, HIGH SENSITIVITY RESULT: 12 NG/L — SIGNIFICANT CHANGE UP (ref 6–21)
WBC # BLD: 4.68 K/UL — LOW (ref 4.8–10.8)
WBC # FLD AUTO: 4.68 K/UL — LOW (ref 4.8–10.8)

## 2025-07-15 PROCEDURE — 99233 SBSQ HOSP IP/OBS HIGH 50: CPT

## 2025-07-15 RX ADMIN — INSULIN LISPRO 4 UNIT(S): 100 INJECTION, SOLUTION INTRAVENOUS; SUBCUTANEOUS at 16:45

## 2025-07-15 RX ADMIN — INSULIN GLARGINE-YFGN 12 UNIT(S): 100 INJECTION, SOLUTION SUBCUTANEOUS at 21:58

## 2025-07-15 RX ADMIN — Medication 40 MILLIGRAM(S): at 05:29

## 2025-07-15 RX ADMIN — ENOXAPARIN SODIUM 90 MILLIGRAM(S): 100 INJECTION SUBCUTANEOUS at 11:52

## 2025-07-15 RX ADMIN — LISINOPRIL 20 MILLIGRAM(S): 5 TABLET ORAL at 05:28

## 2025-07-15 RX ADMIN — INSULIN LISPRO 4 UNIT(S): 100 INJECTION, SOLUTION INTRAVENOUS; SUBCUTANEOUS at 11:52

## 2025-07-15 RX ADMIN — INSULIN LISPRO 2: 100 INJECTION, SOLUTION INTRAVENOUS; SUBCUTANEOUS at 22:36

## 2025-07-15 RX ADMIN — TAMSULOSIN HYDROCHLORIDE 0.4 MILLIGRAM(S): 0.4 CAPSULE ORAL at 21:58

## 2025-07-15 RX ADMIN — INSULIN LISPRO 2: 100 INJECTION, SOLUTION INTRAVENOUS; SUBCUTANEOUS at 16:45

## 2025-07-15 RX ADMIN — ATORVASTATIN CALCIUM 20 MILLIGRAM(S): 80 TABLET, FILM COATED ORAL at 21:58

## 2025-07-15 RX ADMIN — Medication 1 APPLICATION(S): at 11:55

## 2025-07-15 RX ADMIN — ENOXAPARIN SODIUM 90 MILLIGRAM(S): 100 INJECTION SUBCUTANEOUS at 22:36

## 2025-07-15 RX ADMIN — INSULIN LISPRO 4 UNIT(S): 100 INJECTION, SOLUTION INTRAVENOUS; SUBCUTANEOUS at 08:11

## 2025-07-15 NOTE — PROGRESS NOTE ADULT - SUBJECTIVE AND OBJECTIVE BOX
24H events:    Patient is a 80y old Male who presents with a chief complaint of chest pain  Primary diagnosis of Acute pulmonary embolism        Today is hospital day 1d. This morning patient was seen and examined at bedside, resting comfortably in bed.    No acute or major events overnight.    PAST MEDICAL & SURGICAL HISTORY  HTN (hypertension)    HLD (hyperlipidemia)    Pulmonary embolism    DM (diabetes mellitus)    CAD (coronary artery disease)    H/O colonoscopy    H/O endoscopy      SOCIAL HISTORY:  Social History:    ALLERGIES:  aspirin (Nausea)    MEDICATIONS:  STANDING MEDICATIONS  atorvastatin 20 milliGRAM(s) Oral at bedtime  chlorhexidine 2% Cloths 1 Application(s) Topical daily  dextrose 5%. 1000 milliLiter(s) IV Continuous <Continuous>  dextrose 5%. 1000 milliLiter(s) IV Continuous <Continuous>  dextrose 50% Injectable 25 Gram(s) IV Push once  dextrose 50% Injectable 12.5 Gram(s) IV Push once  dextrose 50% Injectable 25 Gram(s) IV Push once  enoxaparin Injectable 90 milliGRAM(s) SubCutaneous every 12 hours  glucagon  Injectable 1 milliGRAM(s) IntraMuscular once  insulin glargine Injectable (LANTUS) 18 Unit(s) SubCutaneous at bedtime  insulin lispro (ADMELOG) corrective regimen sliding scale   SubCutaneous Before meals and at bedtime  insulin lispro Injectable (ADMELOG) 6 Unit(s) SubCutaneous three times a day before meals  lisinopril 20 milliGRAM(s) Oral daily  pantoprazole    Tablet 40 milliGRAM(s) Oral before breakfast  tamsulosin 0.4 milliGRAM(s) Oral at bedtime    PRN MEDICATIONS  acetaminophen     Tablet .. 650 milliGRAM(s) Oral every 6 hours PRN  aluminum hydroxide/magnesium hydroxide/simethicone Suspension 30 milliLiter(s) Oral every 4 hours PRN  dextrose Oral Gel 15 Gram(s) Oral once PRN  melatonin 3 milliGRAM(s) Oral at bedtime PRN  ondansetron Injectable 4 milliGRAM(s) IV Push every 8 hours PRN    VITALS:   T(F): 98.1  HR: 52  BP: 154/69  RR: 20  SpO2: 95%    PHYSICAL EXAM:  GENERAL: NAD, well-developed  CHEST/LUNG: Clear to auscultation bilaterally; No wheezes, rales or rhonchi  HEART: Regular rate and rhythm; No murmurs, rubs, or gallops  ABDOMEN: Soft, Nontender, Nondistended; Bowel sounds present, no masses.  EXTREMITIES:  2+ Peripheral Pulses, No clubbing, cyanosis, or edema    REVIEW OF SYSTEMS:    CONSTITUTIONAL:  No weakness, fevers or chills  EYES/ENT:  No visual changes;  No vertigo or throat pain   NECK:  No pain or stiffness  RESPIRATORY:  No cough, wheezing, hemoptysis; No shortness of breath at the present moment.  CARDIOVASCULAR:  No chest pain or palpitations  GASTROINTESTINAL:  No abdominal or epigastric pain. No nausea, vomiting, or hematemesis; No diarrhea or constipation. No melena or hematochezia.  GENITOURINARY:  No dysuria, frequency or hematuria  MUSCULOSKELETAL:  FROM all extremities, normal strength, No calf tenderness  NEUROLOGICAL:  No numbness or weakness  SKIN:  No itching, rashes    LABS:                        13.2   6.10  )-----------( 195      ( 14 Jul 2025 05:12 )             40.0     07-14    136  |  99  |  11  ----------------------------<  128[H]  3.5   |  21  |  0.9    Ca    8.7      14 Jul 2025 05:12  Mg     1.9     07-13    TPro  6.5  /  Alb  3.8  /  TBili  0.6  /  DBili  x   /  AST  32  /  ALT  19  /  AlkPhos  95  07-13    PT/INR - ( 13 Jul 2025 11:26 )   PT: 12.30 sec;   INR: 1.04 ratio         PTT - ( 14 Jul 2025 03:32 )  PTT:>200.0 sec  Urinalysis Basic - ( 14 Jul 2025 05:12 )    Color: x / Appearance: x / SG: x / pH: x  Gluc: 128 mg/dL / Ketone: x  / Bili: x / Urobili: x   Blood: x / Protein: x / Nitrite: x   Leuk Esterase: x / RBC: x / WBC x   Sq Epi: x / Non Sq Epi: x / Bacteria: x                RADIOLOGY:    TTE:     FINDINGS:    Left Ventricle:  The left ventricular cavity is normal in size. Left ventricular wall   thickness is normal. Left ventricular systolic function is normal with a   calculated ejection fraction of 68 % by the Coronado's biplane method of   disks. There are no regional wall motion abnormalities seen. There is   mild (grade 1) left ventricular diastolic dysfunction, with normal left   ventricular filling pressure.    Right Ventricle:  The right ventricular cavity is normal in size, with normal wall   thickness and right ventricular systolic function is normal. Right   ventricular free wall strain is -18 %. Right ventricular four chamber   strain is -12.0 %. Tricuspid annular plane systolic excursion (TAPSE) is   1.8 cm (normal >=1.7 cm). The RV to LV ratio is 0.8. RV to LV ratio > 1   is supportive of right ventricular dysfunction.        
CHIEF COMPLAINT:    Patient is a 80y old  Male who presents with a chief complaint of Chest Pain       INTERVAL HPI/OVERNIGHT EVENTS:    Patient seen and examined at bedside. No acute overnight events occurred.    ROS: Denies SOB, chest pain. All other systems are negative.    Medications:  Standing  atorvastatin 20 milliGRAM(s) Oral at bedtime  chlorhexidine 2% Cloths 1 Application(s) Topical daily  dextrose 5%. 1000 milliLiter(s) IV Continuous <Continuous>  dextrose 5%. 1000 milliLiter(s) IV Continuous <Continuous>  dextrose 50% Injectable 25 Gram(s) IV Push once  dextrose 50% Injectable 12.5 Gram(s) IV Push once  dextrose 50% Injectable 25 Gram(s) IV Push once  enoxaparin Injectable 90 milliGRAM(s) SubCutaneous every 12 hours  glucagon  Injectable 1 milliGRAM(s) IntraMuscular once  insulin glargine Injectable (LANTUS) 12 Unit(s) SubCutaneous at bedtime  insulin lispro (ADMELOG) corrective regimen sliding scale   SubCutaneous Before meals and at bedtime  insulin lispro Injectable (ADMELOG) 4 Unit(s) SubCutaneous three times a day before meals  lisinopril 20 milliGRAM(s) Oral daily  pantoprazole    Tablet 40 milliGRAM(s) Oral before breakfast  tamsulosin 0.4 milliGRAM(s) Oral at bedtime    PRN Meds  acetaminophen     Tablet .. 650 milliGRAM(s) Oral every 6 hours PRN  aluminum hydroxide/magnesium hydroxide/simethicone Suspension 30 milliLiter(s) Oral every 4 hours PRN  dextrose Oral Gel 15 Gram(s) Oral once PRN  melatonin 3 milliGRAM(s) Oral at bedtime PRN  ondansetron Injectable 4 milliGRAM(s) IV Push every 8 hours PRN        Vital Signs:    T(F): 98.7 (07-15-25 @ 04:24), Max: 98.7 (07-15-25 @ 04:24)  HR: 69 (07-15-25 @ 05:01) (61 - 69)  BP: 124/65 (07-15-25 @ 04:24) (85/- - 169/89)  RR: 18 (07-15-25 @ 04:24) (18 - 19)  SpO2: 98% (07-15-25 @ 08:00) (96% - 98%)  I&O's Summary    2025 07:01  -  15 Jul 2025 07:00  --------------------------------------------------------  IN: 0 mL / OUT: 430 mL / NET: -430 mL      Daily     Daily Weight in k.1 (2025 20:34)  CAPILLARY BLOOD GLUCOSE      POCT Blood Glucose.: 130 mg/dL (15 Jul 2025 11:33)  POCT Blood Glucose.: 127 mg/dL (15 Jul 2025 07:32)  POCT Blood Glucose.: 133 mg/dL (2025 21:20)  POCT Blood Glucose.: 74 mg/dL (2025 16:31)      PHYSICAL EXAM:  GENERAL:  NAD  SKIN: No rashes or lesions  HEENT: Atraumatic. Normocephalic. Anicteric  NECK:  No JVD.   PULMONARY: Clear to ausculation bilaterally. No wheezing. No rales  CVS: Normal S1, S2. Regular rate and rhythm. No murmurs.  ABDOMEN/GI: Soft, Nontender, Nondistended; Bowel sounds are present  EXTREMITIES:  No edema B/L LE.  NEUROLOGIC:  No motor deficit.  PSYCH: Alert & oriented x 3, normal affect      LABS:                        13.5   4.68  )-----------( 224      ( 15 Jul 2025 05:36 )             40.8     07-15    136  |  98  |  13  ----------------------------<  130[H]  3.6   |  22  |  0.9    Ca    9.0      15 Jul 2025 05:36  Mg     1.8     07-15    TPro  6.3  /  Alb  3.6  /  TBili  0.6  /  DBili  x   /  AST  27  /  ALT  24  /  AlkPhos  96  07-15    PTT - ( 2025 03:32 )  PTT:>200.0 sec          RADIOLOGY & ADDITIONAL TESTS:  Imaging or report Personally Reviewed:  [ ] YES  [ ] NO -->no new images    Telemetry reviewed independently - NSR, no acute events  EKG reviewed independently -->no new EKGs    Consultant(s) Notes Reviewed:  [ ] YES  [ ] NO  Care Discussed with Consultants/Other Providers [ ] YES  [ ] NO    Case discussed with resident  Care discussed with pt        
INTERVENTIONAL RADIOLOGY CONSULT:     Procedure Requested: Thrombectomy    HPI:  80-year-old male past medical history hypertension, hyperlipidemia, diabetes, PE not on AC (never took any meds), CAD with CAD RADS 2 on CCTA ( Dr. Brito, notes L Main artery mild stenosis, Prox LAD mild stenosis)  in May presents for evaluation of chest pain. Pt states that he has been having 4/10 chest pain on exertion for the last 8 months. Diagnosed with a PE 4 months ago, saw heme but "I never got any meds", No one has given him any explanation as to why the PE happened. Pt attributes it to his high BP.  Patient states he was recently started on metformin for his diabetes and whenever he takes the medication he gets right-sided chest pain- no longer wants to take it.  No relieving factors.  Denies fever, headache, cough, shortness of breath, abdominal pain, dysuria, hematuria, vomiting, diarrhea, SOB at rest..    Vitals: /84, HR 78, RR 16, T 98.7, RR 16, 97% on RA   CBC WNL, BMP Na 127, K hemoylzed- pending repeat, BNP 36, Trop 11, coags WNL   EKG: TWI in III, ? ST depression in AVF  CXR: largely unchanged from prior- ? LA?    CT chest angio: Acute bilateral PEs in the right main pulmonary artery, segmental and   subsegmental branches of the lower hemithorax, and left segmental and   subsegmental branches of the lower hemithorax.  Questionable right heart strain with RV: LV ratio of approximately 1.   Consider echo for further evaluation.  Wedge-shaped areas of consolidation in the inferior right middle lobe and   in the basilar left upper lobe, likely infarcted lung.    Heparin gtt started. PERT team activated per ED staff     (13 Jul 2025 13:39)    PAST MEDICAL & SURGICAL HISTORY:  HTN (hypertension    HLD (hyperlipidemia)    Pulmonary embolism    DM (diabetes mellitus)    CAD (coronary artery disease)    H/O colonoscopy    H/O endoscopy    MEDICATIONS  (STANDING):  atorvastatin 20 milliGRAM(s) Oral at bedtime  chlorhexidine 2% Cloths 1 Application(s) Topical daily  dextrose 5%. 1000 milliLiter(s) (100 mL/Hr) IV Continuous <Continuous>  dextrose 5%. 1000 milliLiter(s) (50 mL/Hr) IV Continuous <Continuous>  dextrose 50% Injectable 25 Gram(s) IV Push once  dextrose 50% Injectable 12.5 Gram(s) IV Push once  dextrose 50% Injectable 25 Gram(s) IV Push once  enoxaparin Injectable 90 milliGRAM(s) SubCutaneous every 12 hours  glucagon  Injectable 1 milliGRAM(s) IntraMuscular once  insulin glargine Injectable (LANTUS) 18 Unit(s) SubCutaneous at bedtime  insulin lispro (ADMELOG) corrective regimen sliding scale   SubCutaneous Before meals and at bedtime  insulin lispro Injectable (ADMELOG) 6 Unit(s) SubCutaneous three times a day before meals  lisinopril 20 milliGRAM(s) Oral daily  pantoprazole    Tablet 40 milliGRAM(s) Oral before breakfast  tamsulosin 0.4 milliGRAM(s) Oral at bedtime    MEDICATIONS  (PRN):  acetaminophen     Tablet .. 650 milliGRAM(s) Oral every 6 hours PRN Temp greater or equal to 38C (100.4F), Mild Pain (1 - 3)  aluminum hydroxide/magnesium hydroxide/simethicone Suspension 30 milliLiter(s) Oral every 4 hours PRN Dyspepsia  dextrose Oral Gel 15 Gram(s) Oral once PRN Blood Glucose LESS THAN 70 milliGRAM(s)/deciliter  melatonin 3 milliGRAM(s) Oral at bedtime PRN Insomnia  ondansetron Injectable 4 milliGRAM(s) IV Push every 8 hours PRN Nausea and/or Vomiting    Allergies    aspirin (Nausea)    Intolerances  FAMILY HISTORY:  No family history of cardiac disease (Father, Mother, Sibling)    No family history of cerebrovascular accident (CVA) (Father, Mother, Sibling)    Physical Exam:   Vital Signs Last 24 Hrs  T(C): 36.6 (14 Jul 2025 12:18), Max: 37.1 (13 Jul 2025 20:00)  T(F): 97.8 (14 Jul 2025 12:18), Max: 98.7 (13 Jul 2025 20:00)  HR: 60 (14 Jul 2025 12:18) (52 - 72)  BP: 167/77 (14 Jul 2025 12:18) (125/65 - 180/75)  BP(mean): 110 (14 Jul 2025 12:18) (99 - 110)  RR: 20 (14 Jul 2025 12:18) (18 - 95)  SpO2: 96% (14 Jul 2025 12:18) (95% - 98%)    Parameters below as of 13 Jul 2025 22:30  Patient On (Oxygen Delivery Method): room air          Labs:                         13.2   6.10  )-----------( 195      ( 14 Jul 2025 05:12 )             40.0     07-14    136  |  99  |  11  ----------------------------<  128[H]  3.5   |  21  |  0.9    Ca    8.7      14 Jul 2025 05:12  Mg     1.9     07-13    TPro  6.5  /  Alb  3.8  /  TBili  0.6  /  DBili  x   /  AST  32  /  ALT  19  /  AlkPhos  95  07-13    PT/INR - ( 13 Jul 2025 11:26 )   PT: 12.30 sec;   INR: 1.04 ratio         PTT - ( 14 Jul 2025 03:32 )  PTT:>200.0 sec    Pertinent labs:                      13.2   6.10  )-----------( 195      ( 14 Jul 2025 05:12 )             40.0       07-14    136  |  99  |  11  ----------------------------<  128[H]  3.5   |  21  |  0.9    Ca    8.7      14 Jul 2025 05:12  Mg     1.9     07-13    TPro  6.5  /  Alb  3.8  /  TBili  0.6  /  DBili  x   /  AST  32  /  ALT  19  /  AlkPhos  95  07-13      PT/INR - ( 13 Jul 2025 11:26 )   PT: 12.30 sec;   INR: 1.04 ratio         PTT - ( 14 Jul 2025 03:32 )  PTT:>200.0 sec    Radiology & Additional Studies:   Radiology imaging reviewed.       ASSESSMENT AND PLAN:  80-year-old male past medical history hypertension, hyperlipidemia, diabetes, PE not on AC (never took any meds), CAD with CAD RADS 2 on CCTA ( Dr. Brito, notes L Main artery mild stenosis, Prox LAD mild stenosis)  in May presents for evaluation of chest pain. Pt states that he has been having 4/10 chest pain on exertion for the last 8 months. Diagnosed with a PE 4 months ago, saw heme but "I never got any meds" Patient is a poor historian.     Patient presents clinically stable on room with no active shortness of breath. Patient reports resolution of chest pain since being admitted. Patient had a lower extremity duplex significant of no evidence of any DVTs. Patient was tolerating heparin infusion and now has bridged to Lovenox. Please continue anticoagulation at this time, no clinical indication for a thrombectomy. If patient symptoms worsen please contact IR.     Please call Interventional Radiology with questions or concerns:   - M-F [phone]: [phone]   - All other hours: [phone]  
SUBJECTIVE:   PRADEEP GARCIA (80y Male) was seen at bedside. Denies active complaints.     PHYSICAL EXAM:  GENERAL:  well-groomed / not in acute distress  HEENT: clear conjunctiva pupils bilaterally equal and reactive to light  NERVOUS SYSTEM:  Alert & Oriented to name/place/time. Moves all extremities.   LUNG: Bilateral clear air entry. No wheezing or crackles.   HEART: S1, S2 audible. No abnormal cardiac murmur.   ABDOMEN: Soft, nontender, nondistended, BS+  EXTREMITIES:  no edema, no cyanosis, warm to touch.     Vital Signs Last 24 Hrs  T(C): 36.6 (14 Jul 2025 12:18), Max: 37.1 (13 Jul 2025 20:00)  T(F): 97.8 (14 Jul 2025 12:18), Max: 98.7 (13 Jul 2025 20:00)  HR: 60 (14 Jul 2025 12:18) (52 - 72)  BP: 167/77 (14 Jul 2025 12:18) (125/65 - 180/75)  BP(mean): 110 (14 Jul 2025 12:18) (99 - 110)  RR: 20 (14 Jul 2025 12:18) (18 - 95)  SpO2: 96% (14 Jul 2025 12:18) (95% - 98%)    Parameters below as of 13 Jul 2025 22:30  Patient On (Oxygen Delivery Method): room air    CAPILLARY BLOOD GLUCOSE  POCT Blood Glucose.: 105 mg/dL (14 Jul 2025 11:26)  POCT Blood Glucose.: 123 mg/dL (14 Jul 2025 07:45)  POCT Blood Glucose.: 194 mg/dL (13 Jul 2025 20:23)  POCT Blood Glucose.: 118 mg/dL (13 Jul 2025 15:55)    MEDICATIONS  (STANDING):  atorvastatin 20 milliGRAM(s) Oral at bedtime  chlorhexidine 2% Cloths 1 Application(s) Topical daily  dextrose 5%. 1000 milliLiter(s) (100 mL/Hr) IV Continuous <Continuous>  dextrose 5%. 1000 milliLiter(s) (50 mL/Hr) IV Continuous <Continuous>  dextrose 50% Injectable 25 Gram(s) IV Push once  dextrose 50% Injectable 12.5 Gram(s) IV Push once  dextrose 50% Injectable 25 Gram(s) IV Push once  enoxaparin Injectable 90 milliGRAM(s) SubCutaneous every 12 hours  glucagon  Injectable 1 milliGRAM(s) IntraMuscular once  insulin glargine Injectable (LANTUS) 18 Unit(s) SubCutaneous at bedtime  insulin lispro (ADMELOG) corrective regimen sliding scale   SubCutaneous Before meals and at bedtime  insulin lispro Injectable (ADMELOG) 6 Unit(s) SubCutaneous three times a day before meals  lisinopril 20 milliGRAM(s) Oral daily  pantoprazole    Tablet 40 milliGRAM(s) Oral before breakfast  tamsulosin 0.4 milliGRAM(s) Oral at bedtime    MEDICATIONS  (PRN):  acetaminophen     Tablet .. 650 milliGRAM(s) Oral every 6 hours PRN Temp greater or equal to 38C (100.4F), Mild Pain (1 - 3)  aluminum hydroxide/magnesium hydroxide/simethicone Suspension 30 milliLiter(s) Oral every 4 hours PRN Dyspepsia  dextrose Oral Gel 15 Gram(s) Oral once PRN Blood Glucose LESS THAN 70 milliGRAM(s)/deciliter  melatonin 3 milliGRAM(s) Oral at bedtime PRN Insomnia  ondansetron Injectable 4 milliGRAM(s) IV Push every 8 hours PRN Nausea and/or Vomiting  
SUBJECTIVE/OVERNIGHT EVENTS  Today is hospital day 2d. This morning patient was seen and examined at bedside, resting comfortably in bed. No acute or major events overnight.    MEDICATIONS  STANDING MEDICATIONS  atorvastatin 20 milliGRAM(s) Oral at bedtime  chlorhexidine 2% Cloths 1 Application(s) Topical daily  dextrose 5%. 1000 milliLiter(s) IV Continuous <Continuous>  dextrose 5%. 1000 milliLiter(s) IV Continuous <Continuous>  dextrose 50% Injectable 25 Gram(s) IV Push once  dextrose 50% Injectable 12.5 Gram(s) IV Push once  dextrose 50% Injectable 25 Gram(s) IV Push once  enoxaparin Injectable 90 milliGRAM(s) SubCutaneous every 12 hours  glucagon  Injectable 1 milliGRAM(s) IntraMuscular once  insulin glargine Injectable (LANTUS) 12 Unit(s) SubCutaneous at bedtime  insulin lispro (ADMELOG) corrective regimen sliding scale   SubCutaneous Before meals and at bedtime  insulin lispro Injectable (ADMELOG) 4 Unit(s) SubCutaneous three times a day before meals  lisinopril 20 milliGRAM(s) Oral daily  pantoprazole    Tablet 40 milliGRAM(s) Oral before breakfast  tamsulosin 0.4 milliGRAM(s) Oral at bedtime    PRN MEDICATIONS  acetaminophen     Tablet .. 650 milliGRAM(s) Oral every 6 hours PRN  aluminum hydroxide/magnesium hydroxide/simethicone Suspension 30 milliLiter(s) Oral every 4 hours PRN  dextrose Oral Gel 15 Gram(s) Oral once PRN  melatonin 3 milliGRAM(s) Oral at bedtime PRN  ondansetron Injectable 4 milliGRAM(s) IV Push every 8 hours PRN    VITALS  T(F): 98.1 (07-15-25 @ 14:14), Max: 98.7 (07-15-25 @ 04:24)  HR: 78 (07-15-25 @ 14:14) (67 - 78)  BP: 154/73 (07-15-25 @ 14:14) (124/65 - 154/73)  RR: 18 (07-15-25 @ 14:14) (18 - 18)  SpO2: 98% (07-15-25 @ 08:00) (96% - 98%)  POCT Blood Glucose.: 167 mg/dL (07-15-25 @ 16:38)  POCT Blood Glucose.: 130 mg/dL (07-15-25 @ 11:33)  POCT Blood Glucose.: 127 mg/dL (07-15-25 @ 07:32)  POCT Blood Glucose.: 133 mg/dL (07-14-25 @ 21:20)    PHYSICAL EXAM  GENERAL:  NAD  SKIN: No rashes or lesions  HEENT: Atraumatic. Normocephalic. Anicteric  NECK:  No JVD.   PULMONARY: Clear to ausculation bilaterally. No wheezing. No rales  CVS: Normal S1, S2. Regular rate and rhythm. No murmurs.  ABDOMEN/GI: Soft, Nontender, Nondistended; Bowel sounds are present  EXTREMITIES:  No edema B/L LE.  NEUROLOGIC:  No motor deficit.  PSYCH: Alert & oriented x 3, normal affect      LABS             13.5   4.68  )-----------( 224      ( 07-15-25 @ 05:36 )             40.8     136  |  98  |  13  -------------------------<  130   07-15-25 @ 05:36  3.6  |  22  |  0.9    Ca      9.0     07-15-25 @ 05:36  Mg     1.8     07-15-25 @ 05:36    TPro  6.3  /  Alb  3.6  /  TBili  0.6  /  DBili  x   /  AST  27  /  ALT  24  /  AlkPhos  96  /  GGT  x     07-15-25 @ 05:36    PTT - ( 07-14-25 @ 03:32 )  PTT:>200.0 sec    Troponin T, High Sensitivity Result: 12 ng/L (07-15-25 @ 12:35)  Pro-Brain Natriuretic Peptide: <36 pg/mL (07-15-25 @ 12:35)  Pro-Brain Natriuretic Peptide: <36 pg/mL (07-13-25 @ 17:13)    Urinalysis Basic - ( 15 Jul 2025 05:36 )    Color: x / Appearance: x / SG: x / pH: x  Gluc: 130 mg/dL / Ketone: x  / Bili: x / Urobili: x   Blood: x / Protein: x / Nitrite: x   Leuk Esterase: x / RBC: x / WBC x   Sq Epi: x / Non Sq Epi: x / Bacteria: x    IMAGING    7/14 US RUQ (assess for mets lesions)   IMPRESSION:  Limited study    Liver lesions poorly evaluated on this ultrasound.

## 2025-07-15 NOTE — PROGRESS NOTE ADULT - ASSESSMENT
79 yo M PMHx PE diagnosed 4 years ago, completed 10 months of anticoagulation, HTN, DM II, HLD, CAD (diagnosed w/ CCTA in 05/2025), BPH who presented for evaluation of chest pain. Pt was found to have b/l PE. He is presenting for chest pain evaluation. He was found to have extensive bilateral PE. Admitted to SDU for close monitoring then downgraded to tele on 7/14. Pt found to have right heart strain and was evaluated by IR who recommended no intervention.    Bilateral PE  - CTA chest 07/13/25: There is an acute pulmonary embolus in the distal right main pulmonary artery extending into the segmental and subsegmental branches of the right lower lobes. There is acute PE in the left segmental and subsegmental branches of the posterior inferior hemithorax. The main pulmonary artery is normal in caliber, measuring 2.1 cm.  - As per patient he was diagnosed with PE approximately 4 months ago but wasn't prescribed ant anticoagulation. Chart review showed negative CTA for PE  - LE doppler: no LE DVT  - ECHO:  EF 68%. No regional wall motion abnormalities, Mild (grade 1) left ventricular diastolic dysfunction. Right ventricular free wall strain is -18 %. Right ventricular four chamber strain is -12.0 %. The RV to LV ratio is 0.8. RV to LV ratio > 1 is supportive of right ventricular dysfunction.  - Trop 36 > 36  - Not a candidate for thrombectomy. Appreciate pulm and IR eval.   - heme eval appreciated, change to Elilquis  - repeat troponin, BNP ensure no worsening cardiac function    Incidental CT findings of liver  - CTA chest: There are multiple indeterminate hypodensities in the right lobe of the liver, similar to prior.  - RUQ sono done in SDU which showed: liver lesions poorly evaluated on this ultrasound.      CAD  - Diagnosed w/ CCTA  - Normal nuclear stress test in 04/2025   - Allergic to ASA. C/w atorvastatin.     DM II   - Reduce to Insulin Basal/Bolus (12 /4-4-4) w/ ISS    HTN  - C/w lisinopril     BPH - C/w Flomax    HLD - C/w atorvastatin 20 mg    # Prophylactic measures   - DVT PPx: on therapeutic Lovenox  - GI PPx: PPI    Physician Handoff  - Monitor troponin, BNP, is stable by AM then dc    .

## 2025-07-15 NOTE — PROGRESS NOTE ADULT - ASSESSMENT
81 yo M PMHx PE diagnosed 4 years ago, completed 10 months of anticoagulation, HTN, DM II, HLD, CAD (diagnosed w/ CCTA in 05/2025), BPH who presented for evaluation of chest pain. Pt was found to have b/l PE. He is presenting for chest pain evaluation. He was found to have extensive bilateral PE. Admitted to SDU for close monitoring then downgraded to tele on 7/14. Pt found to have right heart strain and was evaluated by IR who recommended no intervention.    #Bilateral PE  - CTA chest 07/13/25: There is an acute pulmonary embolus in the distal right main pulmonary artery extending into the segmental and subsegmental branches of the right lower lobes. There is acute PE in the left segmental and subsegmental branches of the posterior inferior hemithorax. The main pulmonary artery is normal in caliber, measuring 2.1 cm.  - As per patient he was diagnosed with PE approximately 4 months ago but wasn't prescribed ant anticoagulation. Chart review showed negative CTA for PE  - LE doppler: no LE DVT  - ECHO:  EF 68%. No regional wall motion abnormalities, Mild (grade 1) left ventricular diastolic dysfunction. Right ventricular free wall strain is -18 %. Right ventricular four chamber strain is -12.0 %. The RV to LV ratio is 0.8. RV to LV ratio > 1 is supportive of right ventricular dysfunction.  - Trop 36 > 36  - Not a candidate for thrombectomy. Appreciate Pulm and IR eval.   - Heme eval appreciated, change to Elilquis  - repeat troponin (12) , BNP (<36) ensure no worsening cardiac function    #Incidental CT findings of liver  - CTA chest: There are multiple indeterminate hypodensities in the right lobe of the liver, similar to prior.  - RUQ sono done in SDU which showed: liver lesions poorly evaluated on this ultrasound.    #CAD  - Diagnosed w/ CCTA  - Normal nuclear stress test in 04/2025   - Allergic to ASA. C/w atorvastatin.     #DM II   - Reduce to Insulin Basal/Bolus (12 /4-4-4) w/ ISS    #HTN  - C/w lisinopril     #BPH   - C/w Flomax    #HLD   - C/w atorvastatin 20 mg    #MSC  - DVT PPx: on therapeutic Lovenox  - GI PPx: PPI    Physician Handoff  - Good troponin, BNP, if stable by AM then dc    .

## 2025-07-16 ENCOUNTER — TRANSCRIPTION ENCOUNTER (OUTPATIENT)
Age: 80
End: 2025-07-16

## 2025-07-16 VITALS
SYSTOLIC BLOOD PRESSURE: 135 MMHG | DIASTOLIC BLOOD PRESSURE: 74 MMHG | OXYGEN SATURATION: 97 % | TEMPERATURE: 98 F | HEART RATE: 70 BPM

## 2025-07-16 LAB
ALBUMIN SERPL ELPH-MCNC: 3.6 G/DL — SIGNIFICANT CHANGE UP (ref 3.5–5.2)
ALP SERPL-CCNC: 91 U/L — SIGNIFICANT CHANGE UP (ref 30–115)
ALT FLD-CCNC: 23 U/L — SIGNIFICANT CHANGE UP (ref 0–41)
ANION GAP SERPL CALC-SCNC: 11 MMOL/L — SIGNIFICANT CHANGE UP (ref 7–14)
AST SERPL-CCNC: 22 U/L — SIGNIFICANT CHANGE UP (ref 0–41)
BILIRUB SERPL-MCNC: 0.5 MG/DL — SIGNIFICANT CHANGE UP (ref 0.2–1.2)
BUN SERPL-MCNC: 17 MG/DL — SIGNIFICANT CHANGE UP (ref 10–20)
CALCIUM SERPL-MCNC: 9 MG/DL — SIGNIFICANT CHANGE UP (ref 8.4–10.5)
CHLORIDE SERPL-SCNC: 102 MMOL/L — SIGNIFICANT CHANGE UP (ref 98–110)
CO2 SERPL-SCNC: 25 MMOL/L — SIGNIFICANT CHANGE UP (ref 17–32)
CREAT SERPL-MCNC: 1.1 MG/DL — SIGNIFICANT CHANGE UP (ref 0.7–1.5)
EGFR: 69 ML/MIN/1.73M2 — SIGNIFICANT CHANGE UP
EGFR: 69 ML/MIN/1.73M2 — SIGNIFICANT CHANGE UP
GLUCOSE BLDC GLUCOMTR-MCNC: 116 MG/DL — HIGH (ref 70–99)
GLUCOSE BLDC GLUCOMTR-MCNC: 117 MG/DL — HIGH (ref 70–99)
GLUCOSE BLDC GLUCOMTR-MCNC: 120 MG/DL — HIGH (ref 70–99)
GLUCOSE SERPL-MCNC: 121 MG/DL — HIGH (ref 70–99)
HCT VFR BLD CALC: 40.8 % — LOW (ref 42–52)
HGB BLD-MCNC: 13.6 G/DL — LOW (ref 14–18)
MAGNESIUM SERPL-MCNC: 1.9 MG/DL — SIGNIFICANT CHANGE UP (ref 1.8–2.4)
MCHC RBC-ENTMCNC: 26.2 PG — LOW (ref 27–31)
MCHC RBC-ENTMCNC: 33.3 G/DL — SIGNIFICANT CHANGE UP (ref 32–37)
MCV RBC AUTO: 78.5 FL — LOW (ref 80–94)
NRBC BLD AUTO-RTO: 0 /100 WBCS — SIGNIFICANT CHANGE UP (ref 0–0)
PLATELET # BLD AUTO: 234 K/UL — SIGNIFICANT CHANGE UP (ref 130–400)
PMV BLD: 9.9 FL — SIGNIFICANT CHANGE UP (ref 7.4–10.4)
POTASSIUM SERPL-MCNC: 3.6 MMOL/L — SIGNIFICANT CHANGE UP (ref 3.5–5)
POTASSIUM SERPL-SCNC: 3.6 MMOL/L — SIGNIFICANT CHANGE UP (ref 3.5–5)
PROT SERPL-MCNC: 6.4 G/DL — SIGNIFICANT CHANGE UP (ref 6–8)
RBC # BLD: 5.2 M/UL — SIGNIFICANT CHANGE UP (ref 4.7–6.1)
RBC # FLD: 13 % — SIGNIFICANT CHANGE UP (ref 11.5–14.5)
SODIUM SERPL-SCNC: 138 MMOL/L — SIGNIFICANT CHANGE UP (ref 135–146)
WBC # BLD: 5.28 K/UL — SIGNIFICANT CHANGE UP (ref 4.8–10.8)
WBC # FLD AUTO: 5.28 K/UL — SIGNIFICANT CHANGE UP (ref 4.8–10.8)

## 2025-07-16 PROCEDURE — 99239 HOSP IP/OBS DSCHRG MGMT >30: CPT | Mod: GC

## 2025-07-16 RX ORDER — ATORVASTATIN CALCIUM 80 MG/1
1 TABLET, FILM COATED ORAL
Refills: 0 | DISCHARGE

## 2025-07-16 RX ORDER — CHLORTHALIDONE 25 MG/1
1 TABLET ORAL
Refills: 0 | DISCHARGE

## 2025-07-16 RX ORDER — APIXABAN 5 MG/1
1 TABLET, FILM COATED ORAL
Qty: 74 | Refills: 0
Start: 2025-07-16 | End: 2025-08-14

## 2025-07-16 RX ORDER — APIXABAN 5 MG/1
2 TABLET, FILM COATED ORAL
Qty: 28 | Refills: 0
Start: 2025-07-16 | End: 2025-07-22

## 2025-07-16 RX ORDER — TAMSULOSIN HYDROCHLORIDE 0.4 MG/1
1 CAPSULE ORAL
Qty: 0 | Refills: 0 | DISCHARGE
Start: 2025-07-16

## 2025-07-16 RX ORDER — TAMSULOSIN HYDROCHLORIDE 0.4 MG/1
1 CAPSULE ORAL
Refills: 0 | DISCHARGE

## 2025-07-16 RX ORDER — APIXABAN 5 MG/1
10 TABLET, FILM COATED ORAL EVERY 12 HOURS
Refills: 0 | Status: DISCONTINUED | OUTPATIENT
Start: 2025-07-16 | End: 2025-07-16

## 2025-07-16 RX ORDER — LISINOPRIL 5 MG/1
1 TABLET ORAL
Refills: 0 | DISCHARGE

## 2025-07-16 RX ORDER — METFORMIN HYDROCHLORIDE 850 MG/1
1 TABLET ORAL
Refills: 0 | DISCHARGE

## 2025-07-16 RX ORDER — APIXABAN 5 MG/1
TABLET, FILM COATED ORAL
Refills: 0 | Status: DISCONTINUED | OUTPATIENT
Start: 2025-07-16 | End: 2025-07-16

## 2025-07-16 RX ORDER — APIXABAN 5 MG/1
10 TABLET, FILM COATED ORAL ONCE
Refills: 0 | Status: COMPLETED | OUTPATIENT
Start: 2025-07-16 | End: 2025-07-16

## 2025-07-16 RX ORDER — ATORVASTATIN CALCIUM 80 MG/1
1 TABLET, FILM COATED ORAL
Qty: 0 | Refills: 0 | DISCHARGE
Start: 2025-07-16

## 2025-07-16 RX ORDER — APIXABAN 5 MG/1
1 TABLET, FILM COATED ORAL
Qty: 60 | Refills: 0
Start: 2025-07-16 | End: 2025-08-14

## 2025-07-16 RX ORDER — CHLORTHALIDONE 25 MG/1
1 TABLET ORAL
Qty: 30 | Refills: 0
Start: 2025-07-16 | End: 2025-08-14

## 2025-07-16 RX ADMIN — Medication 1 APPLICATION(S): at 11:49

## 2025-07-16 RX ADMIN — LISINOPRIL 20 MILLIGRAM(S): 5 TABLET ORAL at 05:25

## 2025-07-16 RX ADMIN — INSULIN LISPRO 4 UNIT(S): 100 INJECTION, SOLUTION INTRAVENOUS; SUBCUTANEOUS at 11:46

## 2025-07-16 RX ADMIN — APIXABAN 10 MILLIGRAM(S): 5 TABLET, FILM COATED ORAL at 13:30

## 2025-07-16 RX ADMIN — APIXABAN 10 MILLIGRAM(S): 5 TABLET, FILM COATED ORAL at 17:25

## 2025-07-16 RX ADMIN — INSULIN LISPRO 4 UNIT(S): 100 INJECTION, SOLUTION INTRAVENOUS; SUBCUTANEOUS at 08:24

## 2025-07-16 RX ADMIN — Medication 40 MILLIGRAM(S): at 05:25

## 2025-07-16 NOTE — DISCHARGE NOTE PROVIDER - NSDCFUSCHEDAPPT_GEN_ALL_CORE_FT
Thaddeus Gallego  [facility] PreAdmits  Scheduled Appointment: 07/25/2025    Thaddeus Gallego  [facility]  INTMED  [address]  Scheduled Appointment: 07/25/2025    Ghulam Sullivan  [facility] PreAdmits  Scheduled Appointment: 08/12/2025    Ghulam Sullivan  [facility]  OPHTHALM  [address]  Scheduled Appointment: 08/12/2025    David Croft  [facility] PreAdmits  Scheduled Appointment: 10/01/2025    David Croft  [facility]  OPHTHALM  [address]  Scheduled Appointment: 10/01/2025

## 2025-07-16 NOTE — DISCHARGE NOTE PROVIDER - NSDCCPCAREPLAN_GEN_ALL_CORE_FT
PRINCIPAL DISCHARGE DIAGNOSIS  Diagnosis: Acute pulmonary embolism  Assessment and Plan of Treatment: You came to the hospital because of chest pain. Tests showed you have blood clots in the arteries of your lungs. This is called a pulmonary embolism, or PE. These blood clots can be dangerous, but you are now on a medication called Eliquis to help dissolve the clots and prevent new ones from forming. You also had some strain on your heart because of the blood clots, but your overall heart function is good.  You have other medical conditions, including high blood pressure, high cholesterol, and diabetes. You also have some mild narrowing of the arteries in your heart. We have made some changes to your medications to help manage these conditions. You should discuss your diabetes treatment plan with your primary care doctor. You will continue taking lisinopril for your blood pressure, atorvastatin for your cholesterol, and Flomax for your prostate.  It’s very important that you follow up with your primary care doctor, a heart doctor (cardiologist), and a blood specialist (hematologist) within the next week. Please call their offices to schedule appointments as soon as possible. If you have any questions, please call your doctor’s office.

## 2025-07-16 NOTE — DISCHARGE NOTE NURSING/CASE MANAGEMENT/SOCIAL WORK - PATIENT PORTAL LINK FT
You can access the FollowMyHealth Patient Portal offered by [facility] by registering at the following website: [email or web address]. By joining [employer]’s FollowMyHealth portal, you will also be able to view your health information using other applications (apps) compatible with our system. COUGH/FEVER

## 2025-07-16 NOTE — DISCHARGE NOTE PROVIDER - NSDCMRMEDTOKEN_GEN_ALL_CORE_FT
atorvastatin 20 mg oral tablet: 1 tab(s) orally once a day  chlorthalidone 25 mg oral tablet: 1 tab(s) orally once a day  famotidine 40 mg oral tablet: 1 tab(s) orally once a day (at bedtime)  lisinopril 20 mg oral tablet: 1 tab(s) orally once a day  MetFORMIN (Eqv-Fortamet) 1000 mg oral tablet, extended release: 1 tab(s) orally once a day  pantoprazole 40 mg oral delayed release tablet: 1 tab(s) orally once a day  potassium chloride: 8 milliequivalent(s) once a day (at bedtime)  tamsulosin 0.4 mg oral capsule: 1 cap(s) orally once a day (at bedtime)   atorvastatin 20 mg oral tablet: 1 tab(s) orally once a day (at bedtime)  chlorthalidone 25 mg oral tablet: 1 tab(s) orally once a day  Eliquis 5 mg oral tablet: 2 tab(s) orally 2 times a day  tamsulosin 0.4 mg oral capsule: 1 cap(s) orally once a day (at bedtime)   atorvastatin 20 mg oral tablet: 1 tab(s) orally once a day  chlorthalidone 25 mg oral tablet: 1 tab(s) orally once a day  Eliquis Starter Pack for Treatment of DVT and PE 5 mg oral tablet: 1 tab(s) orally 2 times a day  famotidine 40 mg oral tablet: 1 tab(s) orally once a day  lisinopril 20 mg oral tablet: 1 tab(s) orally once a day  metFORMIN 1000 mg oral tablet, extended release: 1 tab(s) orally once a day  pantoprazole 40 mg oral delayed release tablet: 1 tab(s) orally once a day  potassium chloride: 8 milliequivalent(s) orally once (at bedtime)  tamsulosin 0.4 mg oral capsule: 1 cap(s) orally once a day

## 2025-07-16 NOTE — DISCHARGE NOTE PROVIDER - CARE PROVIDERS DIRECT ADDRESSES
[email or web address]

## 2025-07-16 NOTE — DISCHARGE NOTE PROVIDER - PROVIDER TOKENS
PROVIDER:[TOKEN:[[id number]],FOLLOWUP:[1 week]],PROVIDER:[TOKEN:[[id number]],FOLLOWUP:[1 week]]

## 2025-07-16 NOTE — DISCHARGE NOTE PROVIDER - HOSPITAL COURSE
This 80-year-old male with a past medical history of hypertension, hyperlipidemia, diabetes, a remote pulmonary embolism (PE) not on anticoagulation, and coronary artery disease (CAD) presented with an 8-month history of exertional chest pain (4/10). He reported a PE diagnosis four months prior, but denied receiving or taking any prescribed anticoagulants. The patient attributed the PE to his hypertension. He also noted new onset right-sided chest pain associated with recently initiated metformin, which he subsequently discontinued. He denied fever, headache, cough, shortness of breath at rest, abdominal pain, dysuria, hematuria, vomiting, or diarrhea.    On admission, his vital signs were stable. Initial laboratory results were notable for hyponatremia, a mildly elevated troponin, and normal coagulation studies. EKG showed T-wave inversions in lead III and possible ST depression in aVF. Chest x-ray showed minimal changes from prior and a possible enlarged left atrium. CT chest angiogram revealed acute bilateral pulmonary emboli (PEs), questionable right heart strain, and wedge-shaped areas of consolidation suggestive of pulmonary infarcts. Heparin was initiated, and the PERT team was consulted. A lower extremity Doppler was negative for deep vein thrombosis. An echocardiogram showed normal left ventricular ejection fraction (68%) with mild diastolic dysfunction, and evidence of right ventricular strain. He was deemed not a candidate for thrombectomy after evaluation by Interventional Radiology and Pulmonary. He was transitioned to Eliquis for anticoagulation. Repeat troponin and BNP showed improvement. Incidental liver hypodensities on CT chest angiogram were poorly visualized on a subsequent right upper quadrant ultrasound.    The patient's CAD was diagnosed previously by coronary CT angiogram (CCTA) showing mild stenosis of the left main and proximal LAD arteries. He had a normal nuclear stress test four months prior and has an allergy to aspirin. He is currently treated with atorvastatin for hyperlipidemia. For his diabetes, he was transitioned to a basal/bolus insulin regimen. His hypertension is managed with lisinopril, and his benign prostatic hyperplasia with Flomax. He received DVT and GI prophylaxis during his hospitalization. He will follow up with his primary care physician, hematologist, and cardiologist within one week. This 80-year-old male with a past medical history of hypertension, hyperlipidemia, diabetes, a remote pulmonary embolism (PE) not on anticoagulation, and coronary artery disease (CAD) presented with an 8-month history of exertional chest pain (4/10). He reported a PE diagnosis four months prior, but denied receiving or taking any prescribed anticoagulants. The patient attributed the PE to his hypertension. He also noted new onset right-sided chest pain associated with recently initiated metformin, which he subsequently discontinued. He denied fever, headache, cough, shortness of breath at rest, abdominal pain, dysuria, hematuria, vomiting, or diarrhea.    On admission, his vital signs were stable. Initial laboratory results were notable for hyponatremia, a mildly elevated troponin, and normal coagulation studies. EKG showed T-wave inversions in lead III and possible ST depression in aVF. Chest x-ray showed minimal changes from prior and a possible enlarged left atrium. CT chest angiogram revealed acute bilateral pulmonary emboli (PEs), questionable right heart strain, and wedge-shaped areas of consolidation suggestive of pulmonary infarcts. Heparin was initiated, and the PERT team was consulted. A lower extremity Doppler was negative for deep vein thrombosis. An echocardiogram showed normal left ventricular ejection fraction (68%) with mild diastolic dysfunction, and evidence of right ventricular strain. He was deemed not a candidate for thrombectomy after evaluation by Interventional Radiology and Pulmonary. He was transitioned to Eliquis for anticoagulation. Repeat troponin and BNP showed improvement. Incidental liver hypodensities on CT chest angiogram were poorly visualized on a subsequent right upper quadrant ultrasound.    The patient's CAD was diagnosed previously by coronary CT angiogram (CCTA) showing mild stenosis of the left main and proximal LAD arteries. He had a normal nuclear stress test four months prior and has an allergy to aspirin. He is currently treated with atorvastatin for hyperlipidemia. For his diabetes, he was transitioned to a basal/bolus insulin regimen. His hypertension is managed with lisinopril, and his benign prostatic hyperplasia with Flomax. He received DVT and GI prophylaxis during his hospitalization. He will follow up with his primary care physician, hematologist, and cardiologist within one week.    A/P:   Acute bilateral pulmonary embolism:   CTA chest 07/13/25: There is an acute pulmonary embolus in the distal right main pulmonary artery extending into the segmental and subsegmental branches of the right lower lobes. There is acute PE in the left segmental and subsegmental branches of the posterior inferior hemithorax. The main pulmonary artery is normal in caliber, measuring 2.1 cm.  LE doppler: no LE DVT  ECHO:  EF 68%. No regional wall motion abnormalities, Mild (grade 1) left ventricular diastolic dysfunction. Right ventricular free wall strain is -18 %. Right ventricular four chamber strain is -12.0 %. The RV to LV ratio is 0.8. RV to LV ratio > 1 is supportive of right ventricular dysfunction.  - Trop 36 > 36  s/p Lovenox, switched to Eliquis 10mg BID for one week then 5mg BID for 3-6 months  follow up with hematology outpatient.

## 2025-07-16 NOTE — DISCHARGE NOTE PROVIDER - CARE PROVIDER_API CALL
Nahum Brito ()  Interventional Cardiology  [address], [address]  [place] [address]  Phone: [phone]  Fax: [phone]  Follow Up Time: 1 week    Balbir Parikh)  Hematology & Oncology  [address] [place] [id number]  Phone: [phone]  Fax: [phone]  Follow Up Time: 1 week

## 2025-07-16 NOTE — DISCHARGE NOTE NURSING/CASE MANAGEMENT/SOCIAL WORK - FINANCIAL ASSISTANCE
[facility] provides services at a reduced cost to those who are determined to be eligible through [facility]’s financial assistance program. Information regarding [facility]’s financial assistance program can be found by going to [email or web address] or by calling [phone].

## 2025-07-22 ENCOUNTER — OUTPATIENT (OUTPATIENT)
Dept: OUTPATIENT SERVICES | Facility: HOSPITAL | Age: 80
LOS: 1 days | End: 2025-07-22
Payer: MEDICARE

## 2025-07-22 DIAGNOSIS — Z98.890 OTHER SPECIFIED POSTPROCEDURAL STATES: Chronic | ICD-10-CM

## 2025-07-22 DIAGNOSIS — E11.9 TYPE 2 DIABETES MELLITUS WITHOUT COMPLICATIONS: ICD-10-CM

## 2025-07-22 DIAGNOSIS — E87.6 HYPOKALEMIA: ICD-10-CM

## 2025-07-22 DIAGNOSIS — M16.11 UNILATERAL PRIMARY OSTEOARTHRITIS, RIGHT HIP: ICD-10-CM

## 2025-07-22 PROBLEM — I25.10 ATHEROSCLEROTIC HEART DISEASE OF NATIVE CORONARY ARTERY WITHOUT ANGINA PECTORIS: Chronic | Status: ACTIVE | Noted: 2025-07-13

## 2025-07-22 PROCEDURE — 80053 COMPREHEN METABOLIC PANEL: CPT

## 2025-07-22 PROCEDURE — 85025 COMPLETE CBC W/AUTO DIFF WBC: CPT

## 2025-07-22 PROCEDURE — 85610 PROTHROMBIN TIME: CPT

## 2025-07-22 PROCEDURE — 85730 THROMBOPLASTIN TIME PARTIAL: CPT

## 2025-07-23 DIAGNOSIS — N40.0 BENIGN PROSTATIC HYPERPLASIA WITHOUT LOWER URINARY TRACT SYMPTOMS: ICD-10-CM

## 2025-07-23 DIAGNOSIS — I26.99 OTHER PULMONARY EMBOLISM WITHOUT ACUTE COR PULMONALE: ICD-10-CM

## 2025-07-23 DIAGNOSIS — E11.9 TYPE 2 DIABETES MELLITUS WITHOUT COMPLICATIONS: ICD-10-CM

## 2025-07-23 DIAGNOSIS — I26.93 SINGLE SUBSEGMENTAL THROMBOTIC PULMONARY EMBOLISM WITHOUT ACUTE COR PULMONALE: ICD-10-CM

## 2025-07-23 DIAGNOSIS — E55.9 VITAMIN D DEFICIENCY, UNSPECIFIED: ICD-10-CM

## 2025-07-23 DIAGNOSIS — I10 ESSENTIAL (PRIMARY) HYPERTENSION: ICD-10-CM

## 2025-07-23 DIAGNOSIS — I25.10 ATHEROSCLEROTIC HEART DISEASE OF NATIVE CORONARY ARTERY WITHOUT ANGINA PECTORIS: ICD-10-CM

## 2025-07-23 DIAGNOSIS — K76.9 LIVER DISEASE, UNSPECIFIED: ICD-10-CM

## 2025-07-23 DIAGNOSIS — H40.9 UNSPECIFIED GLAUCOMA: ICD-10-CM

## 2025-07-23 DIAGNOSIS — M16.11 UNILATERAL PRIMARY OSTEOARTHRITIS, RIGHT HIP: ICD-10-CM

## 2025-07-23 DIAGNOSIS — Z88.8 ALLERGY STATUS TO OTHER DRUGS, MEDICAMENTS AND BIOLOGICAL SUBSTANCES: ICD-10-CM

## 2025-07-23 DIAGNOSIS — E87.6 HYPOKALEMIA: ICD-10-CM

## 2025-07-23 DIAGNOSIS — E78.5 HYPERLIPIDEMIA, UNSPECIFIED: ICD-10-CM

## 2025-07-24 ENCOUNTER — APPOINTMENT (OUTPATIENT)
Facility: CLINIC | Age: 80
End: 2025-07-24

## 2025-07-25 ENCOUNTER — OUTPATIENT (OUTPATIENT)
Dept: OUTPATIENT SERVICES | Facility: HOSPITAL | Age: 80
LOS: 1 days | End: 2025-07-25
Payer: MEDICARE

## 2025-07-25 ENCOUNTER — APPOINTMENT (OUTPATIENT)
Dept: INTERNAL MEDICINE | Facility: CLINIC | Age: 80
End: 2025-07-25

## 2025-07-25 VITALS
HEIGHT: 68 IN | BODY MASS INDEX: 30.31 KG/M2 | OXYGEN SATURATION: 99 % | HEART RATE: 80 BPM | DIASTOLIC BLOOD PRESSURE: 71 MMHG | WEIGHT: 200 LBS | TEMPERATURE: 98 F | SYSTOLIC BLOOD PRESSURE: 150 MMHG

## 2025-07-25 VITALS — SYSTOLIC BLOOD PRESSURE: 136 MMHG | DIASTOLIC BLOOD PRESSURE: 73 MMHG

## 2025-07-25 DIAGNOSIS — Z98.890 OTHER SPECIFIED POSTPROCEDURAL STATES: Chronic | ICD-10-CM

## 2025-07-25 DIAGNOSIS — I26.99 OTHER PULMONARY EMBOLISM W/OUT ACUTE COR PULMONALE: ICD-10-CM

## 2025-07-25 DIAGNOSIS — Z00.00 ENCOUNTER FOR GENERAL ADULT MEDICAL EXAMINATION WITHOUT ABNORMAL FINDINGS: ICD-10-CM

## 2025-07-25 PROCEDURE — 99214 OFFICE O/P EST MOD 30 MIN: CPT

## 2025-07-25 RX ORDER — APIXABAN 5 MG/1
5 TABLET, FILM COATED ORAL
Qty: 60 | Refills: 0 | Status: ACTIVE | COMMUNITY
Start: 2025-07-25 | End: 1900-01-01

## 2025-07-30 ENCOUNTER — NON-APPOINTMENT (OUTPATIENT)
Age: 80
End: 2025-07-30

## 2025-07-31 DIAGNOSIS — I26.99 OTHER PULMONARY EMBOLISM WITHOUT ACUTE COR PULMONALE: ICD-10-CM

## 2025-07-31 DIAGNOSIS — E11.9 TYPE 2 DIABETES MELLITUS WITHOUT COMPLICATIONS: ICD-10-CM

## 2025-08-01 ENCOUNTER — OUTPATIENT (OUTPATIENT)
Dept: OUTPATIENT SERVICES | Facility: HOSPITAL | Age: 80
LOS: 1 days | End: 2025-08-01
Payer: MEDICARE

## 2025-08-01 DIAGNOSIS — Z98.890 OTHER SPECIFIED POSTPROCEDURAL STATES: Chronic | ICD-10-CM

## 2025-08-01 DIAGNOSIS — E11.9 TYPE 2 DIABETES MELLITUS WITHOUT COMPLICATIONS: ICD-10-CM

## 2025-08-01 PROCEDURE — 80053 COMPREHEN METABOLIC PANEL: CPT

## 2025-08-01 PROCEDURE — 83036 HEMOGLOBIN GLYCOSYLATED A1C: CPT

## 2025-08-02 DIAGNOSIS — E11.9 TYPE 2 DIABETES MELLITUS WITHOUT COMPLICATIONS: ICD-10-CM

## 2025-08-08 ENCOUNTER — APPOINTMENT (OUTPATIENT)
Dept: INTERNAL MEDICINE | Facility: CLINIC | Age: 80
End: 2025-08-08

## 2025-08-08 ENCOUNTER — OUTPATIENT (OUTPATIENT)
Dept: OUTPATIENT SERVICES | Facility: HOSPITAL | Age: 80
LOS: 1 days | End: 2025-08-08
Payer: MEDICARE

## 2025-08-08 VITALS
HEART RATE: 69 BPM | WEIGHT: 210 LBS | HEIGHT: 70 IN | TEMPERATURE: 98.6 F | BODY MASS INDEX: 30.06 KG/M2 | DIASTOLIC BLOOD PRESSURE: 79 MMHG | OXYGEN SATURATION: 98 % | SYSTOLIC BLOOD PRESSURE: 177 MMHG

## 2025-08-08 VITALS — SYSTOLIC BLOOD PRESSURE: 177 MMHG | DIASTOLIC BLOOD PRESSURE: 75 MMHG

## 2025-08-08 DIAGNOSIS — Z00.00 ENCOUNTER FOR GENERAL ADULT MEDICAL EXAMINATION WITHOUT ABNORMAL FINDINGS: ICD-10-CM

## 2025-08-08 DIAGNOSIS — R53.83 OTHER FATIGUE: ICD-10-CM

## 2025-08-08 DIAGNOSIS — I10 ESSENTIAL (PRIMARY) HYPERTENSION: ICD-10-CM

## 2025-08-08 DIAGNOSIS — E11.9 TYPE 2 DIABETES MELLITUS W/OUT COMPLICATIONS: ICD-10-CM

## 2025-08-08 DIAGNOSIS — Z98.890 OTHER SPECIFIED POSTPROCEDURAL STATES: Chronic | ICD-10-CM

## 2025-08-08 DIAGNOSIS — R53.82 CHRONIC FATIGUE, UNSPECIFIED: ICD-10-CM

## 2025-08-08 DIAGNOSIS — R53.81 CHRONIC FATIGUE, UNSPECIFIED: ICD-10-CM

## 2025-08-08 LAB
ALBUMIN SERPL ELPH-MCNC: 4.1 G/DL
ALP BLD-CCNC: 112 U/L
ALT SERPL-CCNC: 23 U/L
ANION GAP SERPL CALC-SCNC: 9 MMOL/L
AST SERPL-CCNC: 27 U/L
BILIRUB SERPL-MCNC: 0.5 MG/DL
BUN SERPL-MCNC: 9 MG/DL
CALCIUM SERPL-MCNC: 9.3 MG/DL
CHLORIDE SERPL-SCNC: 104 MMOL/L
CO2 SERPL-SCNC: 27 MMOL/L
CREAT SERPL-MCNC: 1 MG/DL
EGFRCR SERPLBLD CKD-EPI 2021: 77 ML/MIN/1.73M2
ESTIMATED AVERAGE GLUCOSE: 180 MG/DL
GLUCOSE SERPL-MCNC: 140 MG/DL
HBA1C MFR BLD HPLC: 7.9 %
POTASSIUM SERPL-SCNC: 4.2 MMOL/L
PROT SERPL-MCNC: 6.6 G/DL
SODIUM SERPL-SCNC: 140 MMOL/L

## 2025-08-08 PROCEDURE — 99214 OFFICE O/P EST MOD 30 MIN: CPT

## 2025-08-08 RX ORDER — CHLORTHALIDONE 25 MG/1
25 TABLET ORAL DAILY
Qty: 30 | Refills: 5 | Status: ACTIVE | COMMUNITY
Start: 2025-08-08 | End: 1900-01-01

## 2025-08-08 RX ORDER — TAMSULOSIN HYDROCHLORIDE 0.4 MG/1
0.4 CAPSULE ORAL
Refills: 0 | Status: ACTIVE | COMMUNITY
Start: 2025-08-08

## 2025-08-12 ENCOUNTER — OUTPATIENT (OUTPATIENT)
Dept: OUTPATIENT SERVICES | Facility: HOSPITAL | Age: 80
LOS: 1 days | End: 2025-08-12
Payer: MEDICARE

## 2025-08-12 ENCOUNTER — APPOINTMENT (OUTPATIENT)
Dept: OPHTHALMOLOGY | Facility: CLINIC | Age: 80
End: 2025-08-12
Payer: MEDICARE

## 2025-08-12 DIAGNOSIS — H53.8 OTHER VISUAL DISTURBANCES: ICD-10-CM

## 2025-08-12 DIAGNOSIS — Z98.890 OTHER SPECIFIED POSTPROCEDURAL STATES: Chronic | ICD-10-CM

## 2025-08-12 PROCEDURE — 99214 OFFICE O/P EST MOD 30 MIN: CPT

## 2025-08-18 DIAGNOSIS — R53.82 CHRONIC FATIGUE, UNSPECIFIED: ICD-10-CM

## 2025-08-18 DIAGNOSIS — I10 ESSENTIAL (PRIMARY) HYPERTENSION: ICD-10-CM

## 2025-08-18 DIAGNOSIS — E11.9 TYPE 2 DIABETES MELLITUS WITHOUT COMPLICATIONS: ICD-10-CM

## 2025-08-20 ENCOUNTER — OUTPATIENT (OUTPATIENT)
Dept: OUTPATIENT SERVICES | Facility: HOSPITAL | Age: 80
LOS: 1 days | End: 2025-08-20
Payer: MEDICARE

## 2025-08-20 DIAGNOSIS — Z98.890 OTHER SPECIFIED POSTPROCEDURAL STATES: Chronic | ICD-10-CM

## 2025-08-20 DIAGNOSIS — R53.82 CHRONIC FATIGUE, UNSPECIFIED: ICD-10-CM

## 2025-08-20 PROCEDURE — 84443 ASSAY THYROID STIM HORMONE: CPT

## 2025-08-20 PROCEDURE — 84439 ASSAY OF FREE THYROXINE: CPT

## 2025-08-20 PROCEDURE — 80048 BASIC METABOLIC PNL TOTAL CA: CPT

## 2025-08-21 DIAGNOSIS — H25.13 AGE-RELATED NUCLEAR CATARACT, BILATERAL: ICD-10-CM

## 2025-08-21 DIAGNOSIS — H40.1232 LOW-TENSION GLAUCOMA, BILATERAL, MODERATE STAGE: ICD-10-CM

## 2025-08-21 DIAGNOSIS — R53.82 CHRONIC FATIGUE, UNSPECIFIED: ICD-10-CM

## 2025-08-22 ENCOUNTER — OUTPATIENT (OUTPATIENT)
Dept: OUTPATIENT SERVICES | Facility: HOSPITAL | Age: 80
LOS: 1 days | End: 2025-08-22
Payer: MEDICARE

## 2025-08-22 ENCOUNTER — APPOINTMENT (OUTPATIENT)
Dept: INTERNAL MEDICINE | Facility: CLINIC | Age: 80
End: 2025-08-22

## 2025-08-22 VITALS
DIASTOLIC BLOOD PRESSURE: 75 MMHG | OXYGEN SATURATION: 98 % | SYSTOLIC BLOOD PRESSURE: 161 MMHG | BODY MASS INDEX: 28.35 KG/M2 | WEIGHT: 198 LBS | TEMPERATURE: 98.1 F | HEIGHT: 70 IN | HEART RATE: 75 BPM

## 2025-08-22 VITALS — SYSTOLIC BLOOD PRESSURE: 163 MMHG | DIASTOLIC BLOOD PRESSURE: 73 MMHG

## 2025-08-22 DIAGNOSIS — Z00.00 ENCOUNTER FOR GENERAL ADULT MEDICAL EXAMINATION WITHOUT ABNORMAL FINDINGS: ICD-10-CM

## 2025-08-22 DIAGNOSIS — Z98.890 OTHER SPECIFIED POSTPROCEDURAL STATES: Chronic | ICD-10-CM

## 2025-08-22 PROCEDURE — 99214 OFFICE O/P EST MOD 30 MIN: CPT

## 2025-08-22 RX ORDER — AMLODIPINE BESYLATE 5 MG/1
5 TABLET ORAL DAILY
Qty: 30 | Refills: 2 | Status: ACTIVE | COMMUNITY
Start: 2025-08-22 | End: 1900-01-01

## 2025-08-22 RX ORDER — POTASSIUM CHLORIDE 1500 MG/1
20 TABLET, EXTENDED RELEASE ORAL DAILY
Qty: 30 | Refills: 1 | Status: ACTIVE | COMMUNITY
Start: 2025-08-22 | End: 1900-01-01

## 2025-08-22 RX ORDER — METFORMIN HYDROCHLORIDE 500 MG/1
500 TABLET, COATED ORAL
Qty: 30 | Refills: 3 | Status: ACTIVE | COMMUNITY
Start: 2025-08-22 | End: 1900-01-01

## 2025-08-25 ENCOUNTER — OUTPATIENT (OUTPATIENT)
Dept: OUTPATIENT SERVICES | Facility: HOSPITAL | Age: 80
LOS: 1 days | End: 2025-08-25
Payer: MEDICARE

## 2025-08-25 DIAGNOSIS — I10 ESSENTIAL (PRIMARY) HYPERTENSION: ICD-10-CM

## 2025-08-25 DIAGNOSIS — E87.6 HYPOKALEMIA: ICD-10-CM

## 2025-08-25 DIAGNOSIS — E11.9 TYPE 2 DIABETES MELLITUS WITHOUT COMPLICATIONS: ICD-10-CM

## 2025-08-25 DIAGNOSIS — Z98.890 OTHER SPECIFIED POSTPROCEDURAL STATES: Chronic | ICD-10-CM

## 2025-08-25 LAB
ALBUMIN SERPL ELPH-MCNC: 4.5 G/DL
ALP BLD-CCNC: 99 U/L
ALT SERPL-CCNC: 28 U/L
ANION GAP SERPL CALC-SCNC: 17 MMOL/L
AST SERPL-CCNC: 26 U/L
BILIRUB SERPL-MCNC: 0.7 MG/DL
BUN SERPL-MCNC: 18 MG/DL
CALCIUM SERPL-MCNC: 10.2 MG/DL
CHLORIDE SERPL-SCNC: 98 MMOL/L
CO2 SERPL-SCNC: 25 MMOL/L
CREAT SERPL-MCNC: 1.1 MG/DL
EGFRCR SERPLBLD CKD-EPI 2021: 69 ML/MIN/1.73M2
GLUCOSE SERPL-MCNC: 205 MG/DL
POTASSIUM SERPL-SCNC: 3.3 MMOL/L
PROT SERPL-MCNC: 7.1 G/DL
SODIUM SERPL-SCNC: 140 MMOL/L

## 2025-08-25 PROCEDURE — 36415 COLL VENOUS BLD VENIPUNCTURE: CPT

## 2025-08-25 PROCEDURE — 80053 COMPREHEN METABOLIC PANEL: CPT

## 2025-08-28 DIAGNOSIS — Z00.00 ENCOUNTER FOR GENERAL ADULT MEDICAL EXAMINATION WITHOUT ABNORMAL FINDINGS: ICD-10-CM

## 2025-08-29 ENCOUNTER — OUTPATIENT (OUTPATIENT)
Dept: OUTPATIENT SERVICES | Facility: HOSPITAL | Age: 80
LOS: 1 days | End: 2025-08-29
Payer: MEDICARE

## 2025-08-29 ENCOUNTER — APPOINTMENT (OUTPATIENT)
Dept: PULMONOLOGY | Facility: CLINIC | Age: 80
End: 2025-08-29
Payer: MEDICARE

## 2025-08-29 VITALS
BODY MASS INDEX: 28.77 KG/M2 | TEMPERATURE: 98.1 F | DIASTOLIC BLOOD PRESSURE: 81 MMHG | SYSTOLIC BLOOD PRESSURE: 159 MMHG | WEIGHT: 201 LBS | OXYGEN SATURATION: 100 % | HEART RATE: 65 BPM | HEIGHT: 70 IN

## 2025-08-29 VITALS — DIASTOLIC BLOOD PRESSURE: 74 MMHG | SYSTOLIC BLOOD PRESSURE: 154 MMHG

## 2025-08-29 DIAGNOSIS — Z00.00 ENCOUNTER FOR GENERAL ADULT MEDICAL EXAMINATION WITHOUT ABNORMAL FINDINGS: ICD-10-CM

## 2025-08-29 DIAGNOSIS — I26.99 OTHER PULMONARY EMBOLISM W/OUT ACUTE COR PULMONALE: ICD-10-CM

## 2025-08-29 DIAGNOSIS — Z98.890 OTHER SPECIFIED POSTPROCEDURAL STATES: Chronic | ICD-10-CM

## 2025-08-29 PROCEDURE — G2211 COMPLEX E/M VISIT ADD ON: CPT

## 2025-08-29 PROCEDURE — 99203 OFFICE O/P NEW LOW 30 MIN: CPT

## 2025-08-29 PROCEDURE — 99213 OFFICE O/P EST LOW 20 MIN: CPT

## 2025-09-02 ENCOUNTER — APPOINTMENT (OUTPATIENT)
Dept: OPHTHALMOLOGY | Facility: CLINIC | Age: 80
End: 2025-09-02
Payer: MEDICARE

## 2025-09-02 ENCOUNTER — OUTPATIENT (OUTPATIENT)
Dept: OUTPATIENT SERVICES | Facility: HOSPITAL | Age: 80
LOS: 1 days | End: 2025-09-02
Payer: MEDICARE

## 2025-09-02 DIAGNOSIS — Z98.890 OTHER SPECIFIED POSTPROCEDURAL STATES: Chronic | ICD-10-CM

## 2025-09-02 DIAGNOSIS — H53.8 OTHER VISUAL DISTURBANCES: ICD-10-CM

## 2025-09-02 PROCEDURE — 92136 OPHTHALMIC BIOMETRY: CPT | Mod: 26

## 2025-09-02 PROCEDURE — 92136 OPHTHALMIC BIOMETRY: CPT | Mod: 52,RT

## 2025-09-03 DIAGNOSIS — I26.99 OTHER PULMONARY EMBOLISM WITHOUT ACUTE COR PULMONALE: ICD-10-CM

## 2025-09-04 DIAGNOSIS — H25.13 AGE-RELATED NUCLEAR CATARACT, BILATERAL: ICD-10-CM

## 2025-09-04 DIAGNOSIS — H40.1232 LOW-TENSION GLAUCOMA, BILATERAL, MODERATE STAGE: ICD-10-CM

## 2025-09-19 ENCOUNTER — APPOINTMENT (OUTPATIENT)
Dept: INTERNAL MEDICINE | Facility: CLINIC | Age: 80
End: 2025-09-19

## 2025-09-19 VITALS
WEIGHT: 201 LBS | BODY MASS INDEX: 28.77 KG/M2 | TEMPERATURE: 97.1 F | OXYGEN SATURATION: 99 % | DIASTOLIC BLOOD PRESSURE: 75 MMHG | HEART RATE: 76 BPM | SYSTOLIC BLOOD PRESSURE: 150 MMHG | HEIGHT: 70 IN

## 2025-09-19 DIAGNOSIS — H53.8 OTHER VISUAL DISTURBANCES: ICD-10-CM

## 2025-09-19 DIAGNOSIS — G89.29 PAIN IN UNSPECIFIED HIP: ICD-10-CM

## 2025-09-19 DIAGNOSIS — M25.559 PAIN IN UNSPECIFIED HIP: ICD-10-CM

## 2025-09-25 PROBLEM — E11.65 CONTROLLED DIABETES MELLITUS WITH HYPERGLYCEMIA: Status: ACTIVE | Noted: 2025-09-25
